# Patient Record
Sex: MALE | Race: WHITE | NOT HISPANIC OR LATINO | Employment: OTHER | URBAN - METROPOLITAN AREA
[De-identification: names, ages, dates, MRNs, and addresses within clinical notes are randomized per-mention and may not be internally consistent; named-entity substitution may affect disease eponyms.]

---

## 2017-02-21 ENCOUNTER — GENERIC CONVERSION - ENCOUNTER (OUTPATIENT)
Dept: OTHER | Facility: OTHER | Age: 82
End: 2017-02-21

## 2017-03-02 ENCOUNTER — TRANSCRIBE ORDERS (OUTPATIENT)
Dept: LAB | Facility: OTHER | Age: 82
End: 2017-03-02

## 2017-03-02 ENCOUNTER — APPOINTMENT (OUTPATIENT)
Dept: LAB | Facility: OTHER | Age: 82
End: 2017-03-02
Payer: MEDICARE

## 2017-03-02 DIAGNOSIS — R73.01 IMPAIRED FASTING GLUCOSE: ICD-10-CM

## 2017-03-02 DIAGNOSIS — I10 ESSENTIAL (PRIMARY) HYPERTENSION: ICD-10-CM

## 2017-03-02 LAB
ALBUMIN SERPL BCP-MCNC: 3.5 G/DL (ref 3.5–5)
ALP SERPL-CCNC: 65 U/L (ref 46–116)
ALT SERPL W P-5'-P-CCNC: 22 U/L (ref 12–78)
ANION GAP SERPL CALCULATED.3IONS-SCNC: 8 MMOL/L (ref 4–13)
AST SERPL W P-5'-P-CCNC: 13 U/L (ref 5–45)
BASOPHILS # BLD AUTO: 0.05 THOUSANDS/ΜL (ref 0–0.1)
BASOPHILS NFR BLD AUTO: 1 % (ref 0–1)
BILIRUB SERPL-MCNC: 0.47 MG/DL (ref 0.2–1)
BUN SERPL-MCNC: 15 MG/DL (ref 5–25)
CALCIUM SERPL-MCNC: 8.9 MG/DL (ref 8.3–10.1)
CHLORIDE SERPL-SCNC: 104 MMOL/L (ref 100–108)
CHOLEST SERPL-MCNC: 157 MG/DL (ref 50–200)
CO2 SERPL-SCNC: 27 MMOL/L (ref 21–32)
CREAT SERPL-MCNC: 0.83 MG/DL (ref 0.6–1.3)
EOSINOPHIL # BLD AUTO: 0.17 THOUSAND/ΜL (ref 0–0.61)
EOSINOPHIL NFR BLD AUTO: 2 % (ref 0–6)
ERYTHROCYTE [DISTWIDTH] IN BLOOD BY AUTOMATED COUNT: 13.2 % (ref 11.6–15.1)
EST. AVERAGE GLUCOSE BLD GHB EST-MCNC: 160 MG/DL
GFR SERPL CREATININE-BSD FRML MDRD: >60 ML/MIN/1.73SQ M
GLUCOSE SERPL-MCNC: 133 MG/DL (ref 65–140)
HBA1C MFR BLD: 7.2 % (ref 4.2–6.3)
HCT VFR BLD AUTO: 36.4 % (ref 36.5–49.3)
HDLC SERPL-MCNC: 66 MG/DL (ref 40–60)
HGB BLD-MCNC: 12.3 G/DL (ref 12–17)
LDLC SERPL CALC-MCNC: 65 MG/DL (ref 0–100)
LYMPHOCYTES # BLD AUTO: 1.99 THOUSANDS/ΜL (ref 0.6–4.47)
LYMPHOCYTES NFR BLD AUTO: 28 % (ref 14–44)
MCH RBC QN AUTO: 30.3 PG (ref 26.8–34.3)
MCHC RBC AUTO-ENTMCNC: 33.8 G/DL (ref 31.4–37.4)
MCV RBC AUTO: 90 FL (ref 82–98)
MONOCYTES # BLD AUTO: 0.62 THOUSAND/ΜL (ref 0.17–1.22)
MONOCYTES NFR BLD AUTO: 9 % (ref 4–12)
NEUTROPHILS # BLD AUTO: 4.17 THOUSANDS/ΜL (ref 1.85–7.62)
NEUTS SEG NFR BLD AUTO: 60 % (ref 43–75)
NRBC BLD AUTO-RTO: 0 /100 WBCS
PLATELET # BLD AUTO: 166 THOUSANDS/UL (ref 149–390)
PMV BLD AUTO: 10.9 FL (ref 8.9–12.7)
POTASSIUM SERPL-SCNC: 4.2 MMOL/L (ref 3.5–5.3)
PROT SERPL-MCNC: 7.2 G/DL (ref 6.4–8.2)
RBC # BLD AUTO: 4.06 MILLION/UL (ref 3.88–5.62)
SODIUM SERPL-SCNC: 139 MMOL/L (ref 136–145)
TRIGL SERPL-MCNC: 129 MG/DL
WBC # BLD AUTO: 7.01 THOUSAND/UL (ref 4.31–10.16)

## 2017-03-02 PROCEDURE — 85025 COMPLETE CBC W/AUTO DIFF WBC: CPT

## 2017-03-02 PROCEDURE — 80053 COMPREHEN METABOLIC PANEL: CPT

## 2017-03-02 PROCEDURE — 80061 LIPID PANEL: CPT

## 2017-03-02 PROCEDURE — 83036 HEMOGLOBIN GLYCOSYLATED A1C: CPT

## 2017-03-02 PROCEDURE — 36415 COLL VENOUS BLD VENIPUNCTURE: CPT

## 2017-03-09 ENCOUNTER — ALLSCRIPTS OFFICE VISIT (OUTPATIENT)
Dept: OTHER | Facility: OTHER | Age: 82
End: 2017-03-09

## 2017-03-09 DIAGNOSIS — R73.01 IMPAIRED FASTING GLUCOSE: ICD-10-CM

## 2017-03-09 DIAGNOSIS — Z79.899 OTHER LONG TERM (CURRENT) DRUG THERAPY: ICD-10-CM

## 2017-03-09 DIAGNOSIS — I10 ESSENTIAL (PRIMARY) HYPERTENSION: ICD-10-CM

## 2017-03-24 ENCOUNTER — GENERIC CONVERSION - ENCOUNTER (OUTPATIENT)
Dept: OTHER | Facility: OTHER | Age: 82
End: 2017-03-24

## 2017-04-20 ENCOUNTER — GENERIC CONVERSION - ENCOUNTER (OUTPATIENT)
Dept: OTHER | Facility: OTHER | Age: 82
End: 2017-04-20

## 2017-04-20 ENCOUNTER — HOSPITAL ENCOUNTER (OUTPATIENT)
Dept: BONE DENSITY | Facility: CLINIC | Age: 82
Discharge: HOME | End: 2017-04-20
Payer: MEDICARE

## 2017-04-20 DIAGNOSIS — Z79.899 OTHER LONG TERM (CURRENT) DRUG THERAPY: ICD-10-CM

## 2017-04-20 PROCEDURE — 77080 DXA BONE DENSITY AXIAL: CPT

## 2017-06-06 ENCOUNTER — LAB (OUTPATIENT)
Dept: LAB | Facility: OTHER | Age: 82
End: 2017-06-06
Payer: MEDICARE

## 2017-06-06 DIAGNOSIS — R73.01 IMPAIRED FASTING GLUCOSE: ICD-10-CM

## 2017-06-06 DIAGNOSIS — I10 ESSENTIAL (PRIMARY) HYPERTENSION: ICD-10-CM

## 2017-06-06 DIAGNOSIS — C61 MALIGNANT NEOPLASM OF PROSTATE (HCC): Primary | ICD-10-CM

## 2017-06-06 LAB
ALBUMIN SERPL BCP-MCNC: 3.6 G/DL (ref 3.5–5)
ALP SERPL-CCNC: 61 U/L (ref 46–116)
ALT SERPL W P-5'-P-CCNC: 24 U/L (ref 12–78)
ANION GAP SERPL CALCULATED.3IONS-SCNC: 7 MMOL/L (ref 4–13)
AST SERPL W P-5'-P-CCNC: 19 U/L (ref 5–45)
BASOPHILS # BLD AUTO: 0.03 THOUSANDS/ΜL (ref 0–0.1)
BASOPHILS NFR BLD AUTO: 1 % (ref 0–1)
BILIRUB SERPL-MCNC: 0.46 MG/DL (ref 0.2–1)
BUN SERPL-MCNC: 14 MG/DL (ref 5–25)
CALCIUM SERPL-MCNC: 8.9 MG/DL (ref 8.3–10.1)
CHLORIDE SERPL-SCNC: 104 MMOL/L (ref 100–108)
CHOLEST SERPL-MCNC: 150 MG/DL (ref 50–200)
CO2 SERPL-SCNC: 28 MMOL/L (ref 21–32)
CREAT SERPL-MCNC: 0.82 MG/DL (ref 0.6–1.3)
EOSINOPHIL # BLD AUTO: 0.2 THOUSAND/ΜL (ref 0–0.61)
EOSINOPHIL NFR BLD AUTO: 3 % (ref 0–6)
ERYTHROCYTE [DISTWIDTH] IN BLOOD BY AUTOMATED COUNT: 13.6 % (ref 11.6–15.1)
EST. AVERAGE GLUCOSE BLD GHB EST-MCNC: 128 MG/DL
GFR SERPL CREATININE-BSD FRML MDRD: >60 ML/MIN/1.73SQ M
GLUCOSE P FAST SERPL-MCNC: 114 MG/DL (ref 65–99)
HBA1C MFR BLD: 6.1 % (ref 4.2–6.3)
HCT VFR BLD AUTO: 36.4 % (ref 36.5–49.3)
HDLC SERPL-MCNC: 62 MG/DL (ref 40–60)
HGB BLD-MCNC: 12.3 G/DL (ref 12–17)
LDLC SERPL CALC-MCNC: 67 MG/DL (ref 0–100)
LYMPHOCYTES # BLD AUTO: 1.94 THOUSANDS/ΜL (ref 0.6–4.47)
LYMPHOCYTES NFR BLD AUTO: 30 % (ref 14–44)
MCH RBC QN AUTO: 30.8 PG (ref 26.8–34.3)
MCHC RBC AUTO-ENTMCNC: 33.8 G/DL (ref 31.4–37.4)
MCV RBC AUTO: 91 FL (ref 82–98)
MONOCYTES # BLD AUTO: 0.54 THOUSAND/ΜL (ref 0.17–1.22)
MONOCYTES NFR BLD AUTO: 8 % (ref 4–12)
NEUTROPHILS # BLD AUTO: 3.76 THOUSANDS/ΜL (ref 1.85–7.62)
NEUTS SEG NFR BLD AUTO: 58 % (ref 43–75)
NRBC BLD AUTO-RTO: 0 /100 WBCS
PLATELET # BLD AUTO: 161 THOUSANDS/UL (ref 149–390)
PMV BLD AUTO: 11 FL (ref 8.9–12.7)
POTASSIUM SERPL-SCNC: 4 MMOL/L (ref 3.5–5.3)
PROT SERPL-MCNC: 7.2 G/DL (ref 6.4–8.2)
PSA SERPL-MCNC: <0.1 NG/ML (ref 0–4)
RBC # BLD AUTO: 4 MILLION/UL (ref 3.88–5.62)
SODIUM SERPL-SCNC: 139 MMOL/L (ref 136–145)
TRIGL SERPL-MCNC: 105 MG/DL
WBC # BLD AUTO: 6.47 THOUSAND/UL (ref 4.31–10.16)

## 2017-06-06 PROCEDURE — 80053 COMPREHEN METABOLIC PANEL: CPT

## 2017-06-06 PROCEDURE — 83036 HEMOGLOBIN GLYCOSYLATED A1C: CPT

## 2017-06-06 PROCEDURE — 84153 ASSAY OF PSA TOTAL: CPT

## 2017-06-06 PROCEDURE — 85025 COMPLETE CBC W/AUTO DIFF WBC: CPT

## 2017-06-06 PROCEDURE — 36415 COLL VENOUS BLD VENIPUNCTURE: CPT

## 2017-06-06 PROCEDURE — 80061 LIPID PANEL: CPT

## 2017-06-13 ENCOUNTER — ALLSCRIPTS OFFICE VISIT (OUTPATIENT)
Dept: OTHER | Facility: OTHER | Age: 82
End: 2017-06-13

## 2017-06-19 ENCOUNTER — GENERIC CONVERSION - ENCOUNTER (OUTPATIENT)
Dept: OTHER | Facility: OTHER | Age: 82
End: 2017-06-19

## 2017-08-13 DIAGNOSIS — R73.01 IMPAIRED FASTING GLUCOSE: ICD-10-CM

## 2017-08-13 DIAGNOSIS — I10 ESSENTIAL (PRIMARY) HYPERTENSION: ICD-10-CM

## 2017-09-05 ENCOUNTER — APPOINTMENT (OUTPATIENT)
Dept: LAB | Facility: OTHER | Age: 82
End: 2017-09-05
Payer: MEDICARE

## 2017-09-05 ENCOUNTER — TRANSCRIBE ORDERS (OUTPATIENT)
Dept: LAB | Facility: OTHER | Age: 82
End: 2017-09-05

## 2017-09-05 DIAGNOSIS — R73.01 IMPAIRED FASTING GLUCOSE: ICD-10-CM

## 2017-09-05 DIAGNOSIS — I10 ESSENTIAL (PRIMARY) HYPERTENSION: ICD-10-CM

## 2017-09-05 LAB
ALBUMIN SERPL BCP-MCNC: 3.2 G/DL (ref 3.5–5)
ALP SERPL-CCNC: 63 U/L (ref 46–116)
ALT SERPL W P-5'-P-CCNC: 18 U/L (ref 12–78)
ANION GAP SERPL CALCULATED.3IONS-SCNC: 6 MMOL/L (ref 4–13)
AST SERPL W P-5'-P-CCNC: 19 U/L (ref 5–45)
BASOPHILS # BLD AUTO: 0.04 THOUSANDS/ΜL (ref 0–0.1)
BASOPHILS NFR BLD AUTO: 1 % (ref 0–1)
BILIRUB SERPL-MCNC: 0.45 MG/DL (ref 0.2–1)
BUN SERPL-MCNC: 15 MG/DL (ref 5–25)
CALCIUM SERPL-MCNC: 8.6 MG/DL (ref 8.3–10.1)
CHLORIDE SERPL-SCNC: 106 MMOL/L (ref 100–108)
CHOLEST SERPL-MCNC: 132 MG/DL (ref 50–200)
CO2 SERPL-SCNC: 26 MMOL/L (ref 21–32)
CREAT SERPL-MCNC: 0.86 MG/DL (ref 0.6–1.3)
EOSINOPHIL # BLD AUTO: 0.27 THOUSAND/ΜL (ref 0–0.61)
EOSINOPHIL NFR BLD AUTO: 4 % (ref 0–6)
ERYTHROCYTE [DISTWIDTH] IN BLOOD BY AUTOMATED COUNT: 13.2 % (ref 11.6–15.1)
EST. AVERAGE GLUCOSE BLD GHB EST-MCNC: 126 MG/DL
GFR SERPL CREATININE-BSD FRML MDRD: 81 ML/MIN/1.73SQ M
GLUCOSE P FAST SERPL-MCNC: 108 MG/DL (ref 65–99)
HBA1C MFR BLD: 6 % (ref 4.2–6.3)
HCT VFR BLD AUTO: 35.7 % (ref 36.5–49.3)
HDLC SERPL-MCNC: 56 MG/DL (ref 40–60)
HGB BLD-MCNC: 12.1 G/DL (ref 12–17)
LDLC SERPL CALC-MCNC: 54 MG/DL (ref 0–100)
LYMPHOCYTES # BLD AUTO: 1.84 THOUSANDS/ΜL (ref 0.6–4.47)
LYMPHOCYTES NFR BLD AUTO: 28 % (ref 14–44)
MCH RBC QN AUTO: 30.7 PG (ref 26.8–34.3)
MCHC RBC AUTO-ENTMCNC: 33.9 G/DL (ref 31.4–37.4)
MCV RBC AUTO: 91 FL (ref 82–98)
MONOCYTES # BLD AUTO: 0.67 THOUSAND/ΜL (ref 0.17–1.22)
MONOCYTES NFR BLD AUTO: 10 % (ref 4–12)
NEUTROPHILS # BLD AUTO: 3.67 THOUSANDS/ΜL (ref 1.85–7.62)
NEUTS SEG NFR BLD AUTO: 57 % (ref 43–75)
NRBC BLD AUTO-RTO: 0 /100 WBCS
PLATELET # BLD AUTO: 164 THOUSANDS/UL (ref 149–390)
PMV BLD AUTO: 10.7 FL (ref 8.9–12.7)
POTASSIUM SERPL-SCNC: 4.1 MMOL/L (ref 3.5–5.3)
PROT SERPL-MCNC: 7 G/DL (ref 6.4–8.2)
RBC # BLD AUTO: 3.94 MILLION/UL (ref 3.88–5.62)
SODIUM SERPL-SCNC: 138 MMOL/L (ref 136–145)
TRIGL SERPL-MCNC: 108 MG/DL
WBC # BLD AUTO: 6.5 THOUSAND/UL (ref 4.31–10.16)

## 2017-09-05 PROCEDURE — 80053 COMPREHEN METABOLIC PANEL: CPT

## 2017-09-05 PROCEDURE — 83036 HEMOGLOBIN GLYCOSYLATED A1C: CPT

## 2017-09-05 PROCEDURE — 80061 LIPID PANEL: CPT

## 2017-09-05 PROCEDURE — 36415 COLL VENOUS BLD VENIPUNCTURE: CPT

## 2017-09-05 PROCEDURE — 85025 COMPLETE CBC W/AUTO DIFF WBC: CPT

## 2017-09-11 ENCOUNTER — ALLSCRIPTS OFFICE VISIT (OUTPATIENT)
Dept: OTHER | Facility: OTHER | Age: 82
End: 2017-09-11

## 2017-09-20 ENCOUNTER — GENERIC CONVERSION - ENCOUNTER (OUTPATIENT)
Dept: OTHER | Facility: OTHER | Age: 82
End: 2017-09-20

## 2017-11-11 DIAGNOSIS — I10 ESSENTIAL (PRIMARY) HYPERTENSION: ICD-10-CM

## 2017-11-11 DIAGNOSIS — R73.01 IMPAIRED FASTING GLUCOSE: ICD-10-CM

## 2017-12-16 ENCOUNTER — TRANSCRIBE ORDERS (OUTPATIENT)
Dept: LAB | Facility: OTHER | Age: 82
End: 2017-12-16

## 2017-12-16 ENCOUNTER — APPOINTMENT (OUTPATIENT)
Dept: LAB | Facility: OTHER | Age: 82
End: 2017-12-16
Payer: MEDICARE

## 2017-12-16 DIAGNOSIS — C61 MALIGNANT NEOPLASM OF PROSTATE (HCC): Primary | ICD-10-CM

## 2017-12-16 DIAGNOSIS — R73.01 IMPAIRED FASTING GLUCOSE: ICD-10-CM

## 2017-12-16 DIAGNOSIS — I10 ESSENTIAL (PRIMARY) HYPERTENSION: ICD-10-CM

## 2017-12-16 LAB
ALBUMIN SERPL BCP-MCNC: 3.6 G/DL (ref 3.5–5)
ALP SERPL-CCNC: 59 U/L (ref 46–116)
ALT SERPL W P-5'-P-CCNC: 21 U/L (ref 12–78)
ANION GAP SERPL CALCULATED.3IONS-SCNC: 5 MMOL/L (ref 4–13)
AST SERPL W P-5'-P-CCNC: 19 U/L (ref 5–45)
BASOPHILS # BLD AUTO: 0.05 THOUSANDS/ΜL (ref 0–0.1)
BASOPHILS NFR BLD AUTO: 1 % (ref 0–1)
BILIRUB SERPL-MCNC: 0.53 MG/DL (ref 0.2–1)
BUN SERPL-MCNC: 14 MG/DL (ref 5–25)
CALCIUM SERPL-MCNC: 8.8 MG/DL (ref 8.3–10.1)
CHLORIDE SERPL-SCNC: 106 MMOL/L (ref 100–108)
CHOLEST SERPL-MCNC: 121 MG/DL (ref 50–200)
CO2 SERPL-SCNC: 28 MMOL/L (ref 21–32)
CREAT SERPL-MCNC: 0.87 MG/DL (ref 0.6–1.3)
EOSINOPHIL # BLD AUTO: 0.18 THOUSAND/ΜL (ref 0–0.61)
EOSINOPHIL NFR BLD AUTO: 3 % (ref 0–6)
ERYTHROCYTE [DISTWIDTH] IN BLOOD BY AUTOMATED COUNT: 13.3 % (ref 11.6–15.1)
EST. AVERAGE GLUCOSE BLD GHB EST-MCNC: 131 MG/DL
GFR SERPL CREATININE-BSD FRML MDRD: 80 ML/MIN/1.73SQ M
GLUCOSE P FAST SERPL-MCNC: 97 MG/DL (ref 65–99)
HBA1C MFR BLD: 6.2 % (ref 4.2–6.3)
HCT VFR BLD AUTO: 35 % (ref 36.5–49.3)
HDLC SERPL-MCNC: 58 MG/DL (ref 40–60)
HGB BLD-MCNC: 11.8 G/DL (ref 12–17)
LDLC SERPL CALC-MCNC: 46 MG/DL (ref 0–100)
LYMPHOCYTES # BLD AUTO: 1.75 THOUSANDS/ΜL (ref 0.6–4.47)
LYMPHOCYTES NFR BLD AUTO: 30 % (ref 14–44)
MCH RBC QN AUTO: 30.6 PG (ref 26.8–34.3)
MCHC RBC AUTO-ENTMCNC: 33.7 G/DL (ref 31.4–37.4)
MCV RBC AUTO: 91 FL (ref 82–98)
MONOCYTES # BLD AUTO: 0.43 THOUSAND/ΜL (ref 0.17–1.22)
MONOCYTES NFR BLD AUTO: 7 % (ref 4–12)
NEUTROPHILS # BLD AUTO: 3.45 THOUSANDS/ΜL (ref 1.85–7.62)
NEUTS SEG NFR BLD AUTO: 59 % (ref 43–75)
NRBC BLD AUTO-RTO: 0 /100 WBCS
PLATELET # BLD AUTO: 158 THOUSANDS/UL (ref 149–390)
PMV BLD AUTO: 10.9 FL (ref 8.9–12.7)
POTASSIUM SERPL-SCNC: 4.2 MMOL/L (ref 3.5–5.3)
PROT SERPL-MCNC: 7.2 G/DL (ref 6.4–8.2)
PSA SERPL-MCNC: <0.1 NG/ML (ref 0–4)
RBC # BLD AUTO: 3.86 MILLION/UL (ref 3.88–5.62)
SODIUM SERPL-SCNC: 139 MMOL/L (ref 136–145)
TRIGL SERPL-MCNC: 84 MG/DL
WBC # BLD AUTO: 5.88 THOUSAND/UL (ref 4.31–10.16)

## 2017-12-16 PROCEDURE — 83036 HEMOGLOBIN GLYCOSYLATED A1C: CPT

## 2017-12-16 PROCEDURE — 84153 ASSAY OF PSA TOTAL: CPT

## 2017-12-16 PROCEDURE — 85025 COMPLETE CBC W/AUTO DIFF WBC: CPT

## 2017-12-16 PROCEDURE — 80061 LIPID PANEL: CPT

## 2017-12-16 PROCEDURE — 80053 COMPREHEN METABOLIC PANEL: CPT

## 2017-12-17 ENCOUNTER — GENERIC CONVERSION - ENCOUNTER (OUTPATIENT)
Dept: OTHER | Facility: OTHER | Age: 82
End: 2017-12-17

## 2017-12-22 ENCOUNTER — GENERIC CONVERSION - ENCOUNTER (OUTPATIENT)
Dept: OTHER | Facility: OTHER | Age: 82
End: 2017-12-22

## 2017-12-22 ENCOUNTER — ALLSCRIPTS OFFICE VISIT (OUTPATIENT)
Dept: OTHER | Facility: OTHER | Age: 82
End: 2017-12-22

## 2017-12-22 DIAGNOSIS — I10 ESSENTIAL (PRIMARY) HYPERTENSION: ICD-10-CM

## 2017-12-23 NOTE — PROGRESS NOTES
Assessment   1  Hypertension, benign (401 1) (I10)  2  Hyperlipidemia (272 4) (E78 5)  3  Congestive heart failure (428 0) (I50 9)  4  Impaired fasting glucose (790 21) (R73 01)  5  Polyosteoarthritis, 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)    Discussion/Summary   Discussion Summary:    Chronic problems are stable  Continue present medications  Continue diet and exercise  Ordered labs for next visit  Counseling Documentation With Imm: The patient was counseled regarding instructions for management,-- impressions  Medication SE Review and Pt Understands Tx: Possible side effects of new medications were reviewed with the patient/guardian today  The treatment plan was reviewed with the patient/guardian  The patient/guardian understands and agrees with the treatment plan    Self Referrals:    Self Referrals: No      Chief Complaint   Chief Complaint Free Text Note Form: Patient is here today for a routine follow up and lab review  History of Present Illness   HPI: Patient comes in today for routine follow-up  He states he is doing well and has no new complaints  His blood pressure is controlled  His blood work shows his cholesterol and sugar are well controlled  Heart failure is stable  Arthritis is about the same  Denies any new complaints today  Review of Systems   Complete-Male:      Cardiovascular: no chest pain  Respiratory: no shortness of breath  Gastrointestinal: no abdominal pain  Active Problems   1  Chronic pain (338 29) (G89 29)  2  Congestive heart failure (428 0) (I50 9)  3  History of aortic valve replacement with bioprosthetic valve (V42 2) (Z95 3)  4  Hyperlipidemia (272 4) (E78 5)  5  Hypertension, benign (401 1) (I10)  6  Impaired fasting glucose (790 21) (R73 01)  7  Iron deficiency anemia (280 9) (D50 9)  8  Long term current use of therapeutic drug (V58 69) (Z79 899)  9  Need for prophylactic vaccination and inoculation against influenza (V04 81) (Z23)  10   Osteoarthritis of hip (715 95) (M16 9)  11  Other urinary incontinence (788 39) (N39 498)  12  Polyosteoarthritis, 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)  13  Screening for genitourinary condition (V81 6) (Z13 89)    Past Medical History   1  Acute bronchitis (466 0) (J20 9)  2  Denied: History of mental disorder  3  History of Need for influenza vaccination (V04 81) (Z23)  Active Problems And Past Medical History Reviewed: The active problems and past medical history were reviewed and updated today  Surgical History   1  History of Aortic Valve Replacement  2  History of Cardioverter-defibrillator Pulse Generator Insertion  3  History of Prostatectomy Radical    Family History   Mother   1  Family history of   2  Family history of emphysema (V17 6) (Z82 5)  Father   3  Family history of   Sibling   4  Family history of substance abuse (V17 0) (Z81 4)  Family History   5  Denied: Family history of mental disorder    Social History    · Denied: History of High risk sexual behavior   · Denied: History of Illicit drug use   · Never smoker   · Retired   · Social alcohol use (Z78 9)    Current Meds   1  Carvedilol 12 5 MG Oral Tablet; TAKE 1 5 TABLET Twice daily; Therapy: (Recorded:30Wqa2978) to Recorded  2  Centrum Silver Oral Tablet; Therapy: 49TMX7214 to Recorded  3  Furosemide 20 MG Oral Tablet; TAKE 1 TABLET TWICE DAILY; Therapy: (Recorded:2015) to Recorded  4  Klor-Con 20 MEQ Oral Packet; take 1 packet daily; Therapy: (Recorded:65Oxu7759) to Recorded  5  Lisinopril 5 MG Oral Tablet; Take 1 tablet daily; Therapy: (Recorded:2017) to Recorded  6  Rosuvastatin Calcium 20 MG Oral Tablet; Take 1 tablet daily; Therapy: 21JCY2156 to (Last Rx:25Hwh3133)  Requested for: 47Apl3845 Ordered  7  TraMADol HCl - 50 MG Oral Tablet; TAKE 1 TABLET Twice daily PRN PAIN  Requested for:     00JXA4515; Last Rx:47Ahs5776 Ordered  8  Vitamin B12 TABS; TAKE 2 TABLET Daily 1000 mcg;      Therapy: (Recorded:89Oma8393) to Recorded  Medication List Reviewed: The medication list was reviewed and updated today  Allergies   1  No Known Drug Allergies    Vitals   Vital Signs    Recorded: 49Tdc8847 01:25PM   Temperature 97 7 F   Heart Rate 73   Systolic 262   Diastolic 50   Height 5 ft 7 in   Weight 217 lb 4 oz   BMI Calculated 34 03   BSA Calculated 2 09   O2 Saturation 97     Physical Exam        Constitutional      General appearance: No acute distress, well appearing and well nourished  Pulmonary      Respiratory effort: No increased work of breathing or signs of respiratory distress  Auscultation of lungs: Clear to auscultation, equal breath sounds bilaterally, no wheezes, no rales, no rhonci  Cardiovascular      Auscultation of heart: Normal rate and rhythm, normal S1 and S2, without murmurs  Examination of extremities for edema and/or varicosities: Normal        Abdomen      Abdomen: Non-tender, no masses  Liver and spleen: No hepatomegaly or splenomegaly  Psychiatric      Orientation to person, place and time: Normal        Mood and affect: Normal           Results/Data   (1) CBC/PLT/DIFF 51PTZ0550 10:09AM Alisha Finn Order Number: [id number]      Test Name Result Flag Reference   WBC COUNT 5 88 Thousand/uL  4 31-10 16   RBC COUNT 3 86 Million/uL L 3 88-5 62   HEMOGLOBIN 11 8 g/dL L 12 0-17 0   HEMATOCRIT 35 0 % L 36 5-49 3   MCV 91 fL  82-98   MCH 30 6 pg  26 8-34 3   MCHC 33 7 g/dL  31 4-37 4   RDW 13 3 %  11 6-15 1   MPV 10 9 fL  8 9-12 7   PLATELET COUNT 204 Thousands/uL  149-390   nRBC AUTOMATED 0 /100 WBCs     NEUTROPHILS RELATIVE PERCENT 59 %  43-75   LYMPHOCYTES RELATIVE PERCENT 30 %  14-44   MONOCYTES RELATIVE PERCENT 7 %  4-12   EOSINOPHILS RELATIVE PERCENT 3 %  0-6   BASOPHILS RELATIVE PERCENT 1 %  0-1   NEUTROPHILS ABSOLUTE COUNT 3 45 Thousands/? ??L  1 85-7 62   LYMPHOCYTES ABSOLUTE COUNT 1 75 Thousands/? ??L  0 60-4 47   MONOCYTES ABSOLUTE COUNT 0 43 Thousand/? ??L  0 17-1 22   EOSINOPHILS ABSOLUTE COUNT 0 18 Thousand/? ??L  0 00-0 61   BASOPHILS ABSOLUTE COUNT 0 05 Thousands/? ??L  0 00-0 10      (1) COMPREHENSIVE METABOLIC PANEL 81DGM8813 51:54GT [place] Order Number: [id number]      Test Name Result Flag Reference   SODIUM 139 mmol/L  136-145   POTASSIUM 4 2 mmol/L  3 5-5 3   CHLORIDE 106 mmol/L  100-108   CARBON DIOXIDE 28 mmol/L  21-32   ANION GAP (CALC) 5 mmol/L  4-13   BLOOD UREA NITROGEN 14 mg/dL  5-25   CREATININE 0 87 mg/dL  0 60-1 30   Standardized to IDMS reference method   CALCIUM 8 8 mg/dL  8 3-10 1   BILI, TOTAL 0 53 mg/dL  0 20-1 00   ALK PHOSPHATAS 59 U/L     ALT (SGPT) 21 U/L  12-78   Specimen collection should occur prior to Sulfasalazine and/or Sulfapyridine administration due to the potential for falsely depressed results  AST(SGOT) 19 U/L  5-45   Specimen collection should occur prior to Sulfasalazine administration due to the potential for falsely depressed results  ALBUMIN 3 6 g/dL  3 5-5 0   TOTAL PROTEIN 7 2 g/dL  6 4-8 2   eGFR 80 ml/min/1 73sq [facility] Disease Education Program recommendations are as follows:     GFR calculation is accurate only with a steady state creatinine     Chronic Kidney disease less than 60 ml/min/1 73 sq  meters     Kidney failure less than 15 ml/min/1 73 sq  meters  GLUCOSE FASTING 97 mg/dL  65-99   Specimen collection should occur prior to Sulfasalazine administration due to the potential for falsely depressed results  Specimen collection should occur prior to Sulfapyridine administration due to the potential for falsely elevated results        (1) LIPID PANEL, FASTING 63Wlj1949 10:09AM [place] Order Number: [id number]      Test Name Result Flag Reference   CHOLESTEROL 121 mg/dL     HDL,DIRECT 58 mg/dL  40-60   Specimen collection should occur prior to Metamizole administration due to the potential for falsley depressed results  LDL CHOLESTEROL CALCULATED 46 mg/dL  0-100   Triglyceride:           Normal <150 mg/dl      Borderline High 150-199 mg/dl      High 200-499 mg/dl      Very High >499 mg/dl               Cholesterol:          Desirable <200 mg/dl       Borderline High 200-239 mg/dl       High >239 mg/dl               HDL Cholesterol:          High>59 mg/dL       Low <41 mg/dL               This screening LDL is a calculated result  It does not have the accuracy of the Direct Measured LDL in the monitoring of patients with hyperlipidemia and/or statin therapy  Direct Measure LDL (JSQ461) must be ordered separately in these patients  TRIGLYCERIDES 84 mg/dL  <=150   Specimen collection should occur prior to N-Acetylcysteine or Metamizole administration due to the potential for falsely depressed results  (1) HEMOGLOBIN A1C 24XSF2047 10:09AM [facility] Order Number: [id number]      Test Name Result Flag Reference   HEMOGLOBIN A1C 6 2 %  4 2-6 3   EST  AVG  GLUCOSE 131 mg/dl        (1) PSA, DIAGNOSTIC (FOLLOW-UP) 28HSR1624 10:09AM [facility], Provider   Test ordered by: Omero Bourgeois      Test Name Result Flag Reference   PSA <0 1 ng/mL  0 0-4 0   American Urological Association Guidelines define biochemical recurrence of prostate cancer as a detectable or rising PSA value post-radical prostatectomy that is greater than or equal to 0 2 ng/mL with a second confirmatory level of greater than or equal to 0 2 ng/mL  Health Management   History of Encounter for special screening examination for genitourinary disorder   *VB - Urinary Incontinence Screen (Dx Z13 89 Screen for UI); every 1 year; Last 12NGZ7896; Next    Due: 86AFI9803; Active  History of Encounter for special screening examination for nervous system disorder   *VB - Fall Risk Assessment  (Dx Z13 89 Screen for Neurologic Disorder); every 1 year; Last    72SSR4718; Next Due: 01FST5368;  Active  History of Wellness examination   *VB-Depression Screening; every 1 year; Last 80GDJ2835; Next Due: 90Jbo2893; Active  Medicare Annual Wellness Visit; every 1 year; Last 50ZWG9222; Next Due: 74GZM5552;  Overdue    Signatures    Electronically signed by : Emma Giraldo MD; Dec 22 2017  1:46PM EST                       (Author)     Electronically signed by : Emam Giraldo MD; Dec 22 2017  1:56PM EST                       (Author)

## 2018-01-09 NOTE — PROGRESS NOTES
Assessment    1  Encounter for preventive health examination (V70 0) (Z00 00)    Plan   Health Maintenance    · The plan of care for urinary incontinence is detailed in the plan and/or discussion section  of today's note ; Status:Complete;   Done: 99VQI3249   · There are many exercise options for seniors ; Status:Complete;   Done: 58OQQ2603    Prevnar 13 Intramuscular Suspension; INJECT 0 5  ML Intramuscular; Dose: 0 5; Route: Intramuscular; Site: Right Deltoid; Done: 00MUB0359 09:14AM; Status: Complete;  Ordered  For: Need for pneumococcal vaccine; Ordered By:Walter Pierson; Effective Date:06Sep2016; Administered by: Chiquita Walton: 9/6/2016 9:14:00 AM; Last Updated By: Chiquita Walton; 9/6/2016 9:15:09 AM     Discussion/Summary    Patient requests no treatment for his mild incontinence  Impression: Subsequent Annual Wellness Visit, with preventive exam as well as age and risk appropriate counseling completed  Cardiovascular screening and counseling: screening is current  Diabetes screening and counseling: screening is current  Colorectal cancer screening and counseling: screening not indicated  Prostate cancer screening and counseling: screening is current  Glaucoma screening and counseling: screening is current  Immunizations: influenza vaccine is due today and the risks and benefits of pneumococcal vaccination were discussed with the patient  Patient Discussion: plan discussed with the patient, follow-up visit needed in one year  Self Referrals: No      History of Present Illness  The patient is being seen for the initial annual wellness visit  Medicare Screening and Risk Factors   Hospitalizations: no previous hospitalizations  Medicare Screening Tests Risk Questions   Drug and Alcohol Use: The patient has never smoked cigarettes  The patient reports occasional alcohol use  Alcohol concern:   The patient has no concerns about alcohol abuse  He has never used illicit drugs     Diet and Physical Activity: Current diet includes low salt food choices, 1 servings of fruit per day, 2 servings of meat per day, 2 servings of whole grains per day, 2 cups of coffee per day and 3-4 glasses of water  The patient does not exercise  Mood Disorder and Cognitive Impairment Screening: He denies feeling down, depressed, or hopeless over the past two weeks  He denies feeling little interest or pleasure in doing things over the past two weeks  Cognitive impairment screening: denies difficulty learning/retaining new information, denies difficulty handling complex tasks, denies difficulty with reasoning, denies difficulty with spatial ability and orientation, denies difficulty with language and denies difficulty with behavior  Functional Ability/Level of Safety: He reports hearing difficulties  Activities of daily living details: does not need help using the phone, no transportation help needed, does not need help shopping, no meal preparation help needed, does not need help doing housework, does not need help doing laundry, does not need help managing medications and does not need help managing money  Fall risk factors: The patient fell 0 times in the past 12 months  Home safety risk factors:  loose rugs, uneven floors, no grab bars in the bathroom and no handrails on the stairs, but no unfamiliar surroundings, no poor household lighting and no household clutter  Advance Directives: Advance directives: living will, durable power of  for health care directives and advance directives  end of life decisions were reviewed with the patient  Co-Managers and Medical Equipment/Suppliers: See Patient Care Team     Last Medicare Wellness Visit Information was reviewed, patient interviewed and updates made to the record today  Falls Risk: The patient fell 0 times in the past 12 months  The patient is currently asymptomatic Symptoms Include: The patient is currently experiencing urinary symptoms   Urinary Incontinence Symptoms includes:        Review of Systems    Cardiovascular: no chest pain  Respiratory: no shortness of breath  Active Problems     1  Chronic pain (338 29) (G89 29)   2  Congestive heart failure (428 0) (I50 9)   3  History of aortic valve replacement with bioprosthetic valve (V42 2) (Z95 4)   4  Iron deficiency anemia (280 9) (D50 9)   5  Need for influenza vaccination (V04 81) (Z23)   6  Need for pneumococcal vaccine (V03 82) (Z23)   7  Need for Zostavax administration (V04 89) (Z23)   8  Other urinary incontinence (788 39) (N39 498)   9  Polyosteoarthritis, 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)   10  Screening for genitourinary condition (V81 6) (Z13 89)   11  Status post left hip replacement (V43 64) (Z23 253)   12  Wellness examination (V70 0) (Z00 00)    Hyperlipidemia (272 4) (E78 5)       Hypertension, benign (401 1) (I10)       Osteoarthritis of hip (715 95) (M16 9)          Past Medical History    1  Denied: History of mental disorder   2  History of Need for influenza vaccination (V04 81) (Z23)    The active problems and past medical history were reviewed and updated today  Surgical History    1  History of Aortic Valve Replacement   2  History of Cardioverter-defibrillator Pulse Generator Insertion   3  History of Prostatectomy Radical    The surgical history was reviewed and updated today  Family History  Mother    1  Family history of    2  Family history of emphysema (V17 6) (Z82 5)  Father    3  Family history of   Sibling    4  Family history of substance abuse (V17 0) (Z81 4)  Family History    5  Denied: Family history of mental disorder    The family history was reviewed and updated today  Social History    · Denied: History of High risk sexual behavior   · Denied: History of Illicit drug use   · Never smoker   · Retired   · Social alcohol use (Z78 9)  The social history was reviewed and updated today  Current Meds   1   Carvedilol 12 5 MG Oral Tablet; TAKE 1 5 TABLET Twice daily; Therapy: (Recorded:31Gec8506) to Recorded   2  Centrum Silver Oral Tablet; Therapy: 21OME4170 to Recorded   3  Colace 100 MG Oral Capsule; Take 1 capsule twice daily as needed Recorded   4  Ferrous Sulfate 325 (65 Fe) MG Oral Tablet; TAKE 1 TABLET TWICE DAILY WITH MEALS; Therapy: 10JXU3496 to (Evaluate:90Rdn2236) Recorded   5  Furosemide 20 MG Oral Tablet; TAKE 1 TABLET TWICE DAILY; Therapy: (Recorded:24Mar2015) to Recorded   6  Klor-Con 20 MEQ Oral Packet; take 1 packet daily; Therapy: (0345 74 47 21) to Recorded   7  Lisinopril 2 5 MG Oral Tablet; Take 1 tablet daily as directed; Therapy: 84XQM7261 to (Last Rx:48Rdp5910)  Requested for: 58Efp4559 Ordered   8  Rosuvastatin Calcium 20 MG Oral Tablet; Take 1 tablet daily; Therapy: 60QVV2458 to (Last Rx:05Rwu5757)  Requested for: 86PSI1658 Ordered   9  TraMADol HCl - 50 MG Oral Tablet; Take 1 tablet twice daily  Requested for: 07Lxw0296;   Last Rx:13Kep8458 Ordered   10  Tylenol 325 MG Oral Tablet; TAKE 1 TO 2 TABLETS EVERY 4 HOURS AS NEEDED    Recorded    The medication list was reviewed and updated today  Allergies    1  No Known Drug Allergies    Immunizations  Influenza --- [place] Primrose: [id number]-Wlh-5003Iduibqh Pore: 25-Nov-2015   PPSV --- [place] Primrose: Temporarily Deferred: Pt refuses, As of: 54UTR6012, Defer for 1 Years   Tdap --- Melburn Primrose: 04-May-2011   Zoster --- [place] Primrose: Temporarily Deferred: Pt refuses, As of: 69SBK4098, Defer for 1 Years     Health Management  History of Encounter for special screening examination for genitourinary disorder   *VB - Urinary Incontinence Screen (Dx V81 6 Screen for UI); every 1 year; Last 62LWR5577; Next  Due: 57BTL9720; Active  History of Encounter for special screening examination for nervous system disorder   *VB - Fall Risk Assessment  (Dx V80 09 Screen for Neurologic Disorder); every 1 year; Last  59AZU1604; Next Due: 02FGL1604;  Active  Wellness examination   *VB-Depression Screening; every 1 year; Last 76XRY6909; Next Due: 48BXJ8595; Active  Medicare Annual Wellness Visit; every 1 year; Last 27FMB8768; Next Due: 22NID1090;  Active    Future Appointments    Date/Time Provider Specialty Site   12/06/2016 11:20 AM Mode Bianchi MD Internal Medicine [facility] INTERNAL MED     Signatures   Electronically signed by : Tatiana Soni MD; Sep  6 2016  9:19AM EST                       (Author)

## 2018-01-11 NOTE — PROGRESS NOTES
Assessment    1  Polyosteoarthritis, 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)   2  Chronic pain (338 29) (G89 29)    Discussion/Summary  Discussion Summary:   Options are limited secondary to his age and new med restrictions  He's going to call   He's been on tramadol for years without side effects  Can't have NSAIDS  Can't have narcotics  Tylenol ineffective  He's been to rheumatology  He's seen orthopedics  Tramadol even let him play golf  Counseling Documentation With Imm: The patient was counseled regarding instructions for management, impressions  total time of encounter was 25 minutes and 25 minutes was spent counseling  Medication SE Review and Pt Understands Tx: Possible side effects of new medications were reviewed with the patient/guardian today  Chief Complaint  Chief Complaint Free Text Note Form: Discuss pain medicine      History of Present Illness  HPI: Here for followup because his insurance declined his tramadol  He had switched to the extended relaase that rheumatology had put him on and that may have triggered the flag  He's been without the medicine for 2 days  So far not too bad but it's only been two days  Active Problems    1  Chronic pain (338 29) (G89 29)   2  Congestive heart failure (428 0) (I50 9)   3  History of aortic valve replacement with bioprosthetic valve (V42 2) (Z95 4)   4  Hyperlipidemia (272 4) (E78 5)   5  Hypertension, benign (401 1) (I10)   6  Need for influenza vaccination (V04 81) (Z23)   7  Other urinary incontinence (788 39) (N39 498)   8  Polyosteoarthritis, 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)   9  Wellness examination (V70 0) (Z01 89)    Past Medical History    1  History of Need for influenza vaccination (V04 81) (Z23)  Active Problems And Past Medical History Reviewed: The active problems and past medical history were reviewed and updated today  Surgical History    1  History of Aortic Valve Replacement   2   History of Cardioverter-defibrillator Pulse Generator Insertion   3  History of Prostatectomy Radical    Family History    1  Family history of    2  Family history of emphysema (V17 6) (Z82 5)    3  Family history of     Social History    · Never a smoker    Current Meds   1  Carvedilol 12 5 MG Oral Tablet; TAKE 1 5 TABLET Twice daily; Therapy: (Recorded:65Amq0897) to Recorded   2  Crestor 20 MG Oral Tablet; Take 1 tablet daily; Therapy: 10SSQ7751 to (Last Rx:2015)  Requested for: 2015 Ordered   3  Furosemide 20 MG Oral Tablet; TAKE 1 TABLET TWICE DAILY; Therapy: (Recorded:23Rgu5012) to Recorded   4  Klor-Con 20 MEQ Oral Packet; take 1 packet daily; Therapy: (Recorded:85Jwt3255) to Recorded   5  Lisinopril 2 5 MG Oral Tablet; Take 1 tablet daily as directed; Therapy: 04IYE8722 to (Last Rx:86Brj7763)  Requested for: 12QNK0620 Ordered   6  TraMADol HCl  MG Oral Capsule Extended Release 24 Hour; Take 1 capsule twice daily; Last   ME:36HXD5845 Ordered  Medication List Reviewed: The medication list was reviewed and updated today  Allergies    1  No Known Drug Allergies    Vitals  Vital Signs [Data Includes: Current Encounter]    Recorded: 38BGM9273 01:21PM   Heart Rate 80   Systolic 314   Diastolic 70   Height 5 ft 7 in   Weight 225 lb    BMI Calculated 35 24   BSA Calculated 2 13   O2 Saturation 98     Physical Exam    Constitutional   General appearance: No acute distress, well appearing and well nourished  Health Management  History of Encounter for special screening examination for genitourinary disorder   *VB-Urinary Incontinence Screen (Dx V81 6 Screen for UI); every 1 year; Last 06KUF8740; Next Due:  71QPD7111; Active  History of Encounter for special screening examination for nervous system disorder   *VB - Fall Risk Assessment  (Dx V80 09 Screen for Neurologic Disorder); every 1 year; Last  76UBP4429; Next Due: 53SIR5275; Active  Wellness examination   *VB-Depression Screening; every 1 year;  Last 61NWK1409; Next Due: 22HWV1615; Active  Medicare Annual Wellness Visit; every 1 year; Last 70TWC4382; Next Due: 03HWN2927;  Active    Future Appointments    Date/Time Provider Specialty Site   03/25/2016 11:00 AM Lore Winters MD Internal Medicine [facility] INTERNAL MED     Signatures   Electronically signed by : Karen Bowens MD; Chris 15 2016  1:46PM EST                       (Author)

## 2018-01-11 NOTE — PROGRESS NOTES
Assessment    1  Impaired fasting glucose (790 21) (R73 01)   2  Hypertension, benign (401 1) (I10)   3  Hyperlipidemia (272 4) (E78 5)   4  Congestive heart failure (428 0) (I50 9)    Plan  Hypertension, benign    · (1) CBC/PLT/DIFF; Status:Active; Requested for:09Mar2017;    · (1) COMPREHENSIVE METABOLIC PANEL; Status:Active; Requested for:09Mar2017;    · (1) LIPID PANEL, FASTING; Status:Active; Requested EOA:23CKY5142;    · Begin a limited exercise program ; Status:Complete;   Done: 36DYK7573   · Follow-up visit in 3 months Evaluation and Treatment  Follow-up  Status: Complete  Done:  25JUR6979  Impaired fasting glucose    · (1) HEMOGLOBIN A1C; Status:Active; Requested for:09Mar2017;   Long term current use of therapeutic drug    · * DXA BONE DENSITY SPINE HIP AND PELVIS; Status:Hold For - Scheduling; Requested  YAF:[id number]; Other urinary incontinence, Screening for genitourinary condition    · *VB - Urinary Incontinence Screen (Dx Z13 89 Screen for UI); Status:Complete - Retrospective By  Protocol Authorization;   Done: 44VMK4659 10:31AM  PMH: Encounter for special screening examination for genitourinary disorder    · *VB - Urinary Incontinence Screen (Dx Z13 89 Screen for UI) ; every 1 year; Last 28DBE7875; Next  15ZGH9991; Status:Active    Discussion/Summary  Discussion Summary:   Chronic problems are stable  Reviewed his diet with him  His fatigue may be from the sugar  He's going to knock out the soda and reduce the pasta  Repeat labs in 3 months  Continue followup with specialists  Medication SE Review and Pt Understands Tx: Possible side effects of new medications were reviewed with the patient/guardian today  The treatment plan was reviewed with the patient/guardian   The patient/guardian understands and agrees with the treatment plan   Self Referrals:   Self Referrals: No      Chief Complaint  Chief Complaint Free Text Note Form: Patient returns today for a routine follow up and to review lab results  History of Present Illness  HPI: Patient comes in today for routine follow-up  His blood work is within acceptable limits except his sugar has gotten higher  He is with his wife today  She points out that he drinks a lot of soda  Also a lot of pasta  Since his last visit, he did see his cardiologist and had cardiac and pulmonary testing which were all normal  He still has the fatigue  Review of Systems  Complete-Male:   Cardiovascular: no chest pain  Respiratory: no shortness of breath  Gastrointestinal: no abdominal pain  Active Problems    1  Chronic pain (338 29) (G89 29)   2  Congestive heart failure (428 0) (I50 9)   3  History of aortic valve replacement with bioprosthetic valve (V42 2) (Z95 4)   4  Hyperlipidemia (272 4) (E78 5)   5  Hypertension, benign (401 1) (I10)   6  Impaired fasting glucose (790 21) (R73 01)   7  Iron deficiency anemia (280 9) (D50 9)   8  Osteoarthritis of hip (715 95) (M16 9)   9  Other urinary incontinence (788 39) (N39 498)   10  Polyosteoarthritis, 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)   11  Screening for genitourinary condition (V81 6) (Z13 89)    Past Medical History    1  Denied: History of mental disorder   2  History of Need for influenza vaccination (V04 81) (Z23)  Active Problems And Past Medical History Reviewed: The active problems and past medical history were reviewed and updated today  Surgical History    1  History of Aortic Valve Replacement   2  History of Cardioverter-defibrillator Pulse Generator Insertion   3  History of Prostatectomy Radical    Family History  Mother    1  Family history of    2  Family history of emphysema (V17 6) (Z82 5)  Father    3  Family history of   Sibling    4  Family history of substance abuse (V17 0) (Z81 4)  Family History    5   Denied: Family history of mental disorder    Social History    · Denied: History of High risk sexual behavior   · Denied: History of Illicit drug use   · Never smoker   · Retired   · Social alcohol use (Z78 9)    Current Meds   1  Carvedilol 12 5 MG Oral Tablet; TAKE 1 5 TABLET Twice daily; Therapy: (Recorded:03Knf4376) to Recorded   2  Centrum Silver Oral Tablet; Therapy: 11LIX1173 to Recorded   3  Furosemide 20 MG Oral Tablet; TAKE 1 TABLET TWICE DAILY; Therapy: (Recorded:24Mar2015) to Recorded   4  Klor-Con 20 MEQ Oral Packet; take 1 packet daily; Therapy: (Recorded:24Jul2015) to Recorded   5  Lisinopril 5 MG Oral Tablet; Take 1 tablet daily; Therapy: (Recorded:09Mar2017) to Recorded   6  Rosuvastatin Calcium 20 MG Oral Tablet; Take 1 tablet daily; Therapy: 83PNI5341 to (Last Rx:07Mar2017)  Requested for: 35CSF1386 Ordered   7  TraMADol HCl - 50 MG Oral Tablet; Take 1 tablet twice daily  Requested for: 06Wwc6843; Last   Rx:60Dmr7968 Ordered  Medication List Reviewed: The medication list was reviewed and updated today  Allergies    1  No Known Drug Allergies    Vitals  Vital Signs    Recorded: 50JYA2998 10:19AM   Heart Rate 82   Systolic 942   Diastolic 74   Height 5 ft 7 in   Weight 228 lb 2 08 oz   BMI Calculated 35 73   BSA Calculated 2 14   O2 Saturation 97     Physical Exam    Constitutional   General appearance: No acute distress, well appearing and well nourished  Pulmonary   Respiratory effort: No increased work of breathing or signs of respiratory distress  Auscultation of lungs: Clear to auscultation, equal breath sounds bilaterally, no wheezes, no rales, no rhonci  Cardiovascular   Auscultation of heart: Normal rate and rhythm, normal S1 and S2, without murmurs  Examination of extremities for edema and/or varicosities: Normal     Abdomen   Abdomen: Non-tender, no masses  Liver and spleen: No hepatomegaly or splenomegaly      Psychiatric   Orientation to person, place and time: Normal     Mood and affect: Normal          Results/Data  *VB - Urinary Incontinence Screen (Dx Z13 89 Screen for UI) 57SEQ9564 10:31AM [employer] [facility] Test Name Result Flag Reference   Urinary Incontinence Assessment 40ZNY3265 A      Falls Risk Assessment (Dx Z13 89 Screen for Neurologic Disorder) 17XSX5146 10:29AM User, Shashas     Test Name Result Flag Reference   Falls Risk      No falls in the past year     (1) CBC/PLT/DIFF 69GEC8771 09:34AM [facility] Order Number: [id number]     Test Name Result Flag Reference   WBC COUNT 7 01 Thousand/uL  4 31-10 16   RBC COUNT 4 06 Million/uL  3 88-5 62   HEMOGLOBIN 12 3 g/dL  12 0-17 0   HEMATOCRIT 36 4 % L 36 5-49 3   MCV 90 fL  82-98   MCH 30 3 pg  26 8-34 3   MCHC 33 8 g/dL  31 4-37 4   RDW 13 2 %  11 6-15 1   MPV 10 9 fL  8 9-12 7   PLATELET COUNT 203 Thousands/uL  149-390   nRBC AUTOMATED 0 /100 WBCs     NEUTROPHILS RELATIVE PERCENT 60 %  43-75   LYMPHOCYTES RELATIVE PERCENT 28 %  14-44   MONOCYTES RELATIVE PERCENT 9 %  4-12   EOSINOPHILS RELATIVE PERCENT 2 %  0-6   BASOPHILS RELATIVE PERCENT 1 %  0-1   NEUTROPHILS ABSOLUTE COUNT 4 17 Thousands/? ??L  1 85-7 62   LYMPHOCYTES ABSOLUTE COUNT 1 99 Thousands/? ??L  0 60-4 47   MONOCYTES ABSOLUTE COUNT 0 62 Thousand/? ??L  0 17-1 22   EOSINOPHILS ABSOLUTE COUNT 0 17 Thousand/? ??L  0 00-0 61   BASOPHILS ABSOLUTE COUNT 0 05 Thousands/? ??L  0 00-0 10   - Patient Instructions: This bloodwork is non-fasting  Please drink two glasses of water morning of bloodwork  - Patient Instructions: This bloodwork is non-fasting  Please drink two glasses of water morning of bloodwork  (1) COMPREHENSIVE METABOLIC PANEL 78LBA5037 79:26HE [facility] Order Number: [id number]     Test Name Result Flag Reference   GLUCOSE,RANDM 133 mg/dL     If the patient is fasting, the ADA then defines impaired fasting glucose as > 100 mg/dL and diabetes as > or equal to 123 mg/dL     SODIUM 139 mmol/L  136-145   POTASSIUM 4 2 mmol/L  3 5-5 3   CHLORIDE 104 mmol/L  100-108   CARBON DIOXIDE 27 mmol/L  21-32   ANION GAP (CALC) 8 mmol/L  4-13   BLOOD UREA NITROGEN 15 mg/dL  5-25   CREATININE 0 83 mg/dL  0 60-1 30   Standardized to IDMS reference method   CALCIUM 8 9 mg/dL  8 3-10 1   BILI, TOTAL 0 47 mg/dL  0 20-1 00   ALK PHOSPHATAS 65 U/L     ALT (SGPT) 22 U/L  12-78   AST(SGOT) 13 U/L  5-45   ALBUMIN 3 5 g/dL  3 5-5 0   TOTAL PROTEIN 7 2 g/dL  6 4-8 2   eGFR Non-African American      >60 0 ml/min/1 73sq m   - Patient Instructions: This is a fasting blood test  Water,black tea or black  coffee only after 9:00pm the night before test Drink 2 glasses of water the morning of test   National Kidney Disease Education Program recommendations are as follows:  GFR calculation is accurate only with a steady state creatinine  Chronic Kidney disease less than 60 ml/min/1 73 sq  meters  Kidney failure less than 15 ml/min/1 73 sq  meters  (1) LIPID PANEL, FASTING 02Mar2017 09:34AM [facility] Order Number: [id number]     Test Name Result Flag Reference   CHOLESTEROL 157 mg/dL     HDL,DIRECT 66 mg/dL H 40-60   Specimen collection should occur prior to Metamizole administration due to the potential for falsely depressed results  LDL CHOLESTEROL CALCULATED 65 mg/dL  0-100   - Patient Instructions: This is a fasting blood test  Water,black tea or black  coffee only after 9:00pm the night before test   Drink 2 glasses of water the morning of test     - Patient Instructions: This is a fasting blood test  Water,black tea or black  coffee only after 9:00pm the night before test Drink 2 glasses of water the morning of test   Triglyceride:         Normal              <150 mg/dl       Borderline High    150-199 mg/dl       High               200-499 mg/dl       Very High          >499 mg/dl  Cholesterol:         Desirable        <200 mg/dl      Borderline High  200-239 mg/dl      High             >239 mg/dl  HDL Cholesterol:        High    >59 mg/dL      Low     <41 mg/dL  LDL CALCULATED:    This screening LDL is a calculated result    It does not have the accuracy of the Direct Measured LDL in the monitoring of patients with hyperlipidemia and/or statin therapy  Direct Measure LDL (NJM822) must be ordered separately in these patients  TRIGLYCERIDES 129 mg/dL  <=150   Specimen collection should occur prior to N-Acetylcysteine or Metamizole administration due to the potential for falsely depressed results  (1) HEMOGLOBIN A1C 35MKL0742 09:34AM [facility] Order Number: [id number]     Test Name Result Flag Reference   HEMOGLOBIN A1C 7 2 % H 4 2-6 3   EST  AVG  GLUCOSE 160 mg/dl       Health Management  History of Encounter for special screening examination for genitourinary disorder   *VB - Urinary Incontinence Screen (Dx Z13 89 Screen for UI); every 1 year; Last 28RUG0519; Next  Due: 54LYC9347; Active  History of Encounter for special screening examination for nervous system disorder   *VB - Fall Risk Assessment  (Dx Z13 89 Screen for Neurologic Disorder); every 1 year; Last  76CQO7123; Next Due: 10FCE1952; Near Due  History of Wellness examination   *VB-Depression Screening; every 1 year; Last 84FFI9533; Next Due: 80HUM0036; Overdue  Medicare Annual Wellness Visit; every 1 year; Last 47MFI1812; Next Due: 69LNV2381;  Overdue    Future Appointments    Date/Time Provider Specialty Site   06/13/2017 11:00 AM Alem Weber MD Internal Medicine [facility] INTERNAL MED     Signatures   Electronically signed by : Nhan Terry MD; Mar  9 2017 11:36AM EST                       (Author)

## 2018-01-12 VITALS
HEIGHT: 67 IN | BODY MASS INDEX: 34.28 KG/M2 | WEIGHT: 218.38 LBS | SYSTOLIC BLOOD PRESSURE: 126 MMHG | OXYGEN SATURATION: 97 % | DIASTOLIC BLOOD PRESSURE: 68 MMHG | HEART RATE: 92 BPM

## 2018-01-12 NOTE — RESULT NOTES
Message   Blood count still around 10  Would repeat CBC in one week and stay on the iron       Verified Results  (1) CBC/PLT/DIFF 92NXD2690 09:48AM Adi العلي Order Number: [id number]     Order Number: [id number]     Test Name Result Flag Reference   WBC COUNT 8 05 Thousand/uL  4 31-10 16   RBC COUNT 3 33 Million/uL L 3 88-5 62   HEMOGLOBIN 10 0 g/dL L 12 0-17 0   HEMATOCRIT 30 7 % L 36 5-49 3   MCV 92 fL  82-98   MCH 30 0 pg  26 8-34 3   MCHC 32 6 g/dL  31 4-37 4   RDW 14 0 %  11 6-15 1   MPV 9 3 fL  8 9-12 7   PLATELET COUNT 491 Thousands/uL  149-390   nRBC AUTOMATED 0 /100 WBCs     NEUTROPHILS RELATIVE PERCENT 69 %  43-75   LYMPHOCYTES RELATIVE PERCENT 20 %  14-44   MONOCYTES RELATIVE PERCENT 8 %  4-12   EOSINOPHILS RELATIVE PERCENT 3 %  0-6   BASOPHILS RELATIVE PERCENT 0 %  0-1   NEUTROPHILS ABSOLUTE COUNT 5 55 Thousands/µL  1 85-7 62   LYMPHOCYTES ABSOLUTE COUNT 1 61 Thousands/µL  0 60-4 47   MONOCYTES ABSOLUTE COUNT 0 63 Thousand/µL  0 17-1 22   EOSINOPHILS ABSOLUTE COUNT 0 21 Thousand/µL  0 00-0 61   BASOPHILS ABSOLUTE COUNT 0 03 Thousands/µL  0 00-0 10

## 2018-01-13 VITALS
OXYGEN SATURATION: 97 % | WEIGHT: 228.13 LBS | HEIGHT: 67 IN | SYSTOLIC BLOOD PRESSURE: 132 MMHG | HEART RATE: 82 BPM | DIASTOLIC BLOOD PRESSURE: 74 MMHG | BODY MASS INDEX: 35.81 KG/M2

## 2018-01-14 VITALS
HEART RATE: 83 BPM | DIASTOLIC BLOOD PRESSURE: 68 MMHG | OXYGEN SATURATION: 98 % | WEIGHT: 219.38 LBS | BODY MASS INDEX: 34.43 KG/M2 | HEIGHT: 67 IN | SYSTOLIC BLOOD PRESSURE: 124 MMHG

## 2018-01-14 NOTE — RESULT NOTES
Message   blood count steadily going up, now in normal range     Verified Results  (1) CBC/PLT/DIFF 84OTX0240 07:45AM Mario Santiago   TW Order Number: [id number]  TW Order Number: [id number]     Test Name Result Flag Reference   WBC COUNT 6 23 Thousand/uL  4 31-10 16   RBC COUNT 4 00 Million/uL  3 88-5 62   HEMOGLOBIN 12 0 g/dL  12 0-17 0   HEMATOCRIT 36 9 %  36 5-49 3   MCV 92 fL  82-98   MCH 30 0 pg  26 8-34 3   MCHC 32 5 g/dL  31 4-37 4   RDW 13 9 %  11 6-15 1   MPV 11 1 fL  8 9-12 7   PLATELET COUNT 625 Thousands/uL  149-390   nRBC AUTOMATED 0 /100 WBCs     NEUTROPHILS RELATIVE PERCENT 65 %  43-75   LYMPHOCYTES RELATIVE PERCENT 23 %  14-44   MONOCYTES RELATIVE PERCENT 8 %  4-12   EOSINOPHILS RELATIVE PERCENT 3 %  0-6   BASOPHILS RELATIVE PERCENT 1 %  0-1   NEUTROPHILS ABSOLUTE COUNT 4 06 Thousands/?L  1 85-7 62   LYMPHOCYTES ABSOLUTE COUNT 1 46 Thousands/?L  0 60-4 47   MONOCYTES ABSOLUTE COUNT 0 49 Thousand/?L  0 17-1 22   EOSINOPHILS ABSOLUTE COUNT 0 17 Thousand/?L  0 00-0 61   BASOPHILS ABSOLUTE COUNT 0 04 Thousands/?L  0 00-0 10

## 2018-01-15 NOTE — RESULT NOTES
Message   Blood count is steadily improving    Have him recheck a CBC priot to his appt 5/2     Verified Results  (1) CBC/PLT/DIFF 08Apr2016 10:01AM Eri Shauna Order Number: [id number]     Order Number: [id number]     Test Name Result Flag Reference   WBC COUNT 6 67 Thousand/uL  4 31-10 16   RBC COUNT 3 57 Million/uL L 3 88-5 62   HEMOGLOBIN 10 8 g/dL L 12 0-17 0   HEMATOCRIT 32 8 % L 36 5-49 3   MCV 92 fL  82-98   MCH 30 3 pg  26 8-34 3   MCHC 32 9 g/dL  31 4-37 4   RDW 14 1 %  11 6-15 1   MPV 9 8 fL  8 9-12 7   PLATELET COUNT 481 Thousands/uL  149-390   nRBC AUTOMATED 0 /100 WBCs     NEUTROPHILS RELATIVE PERCENT 64 %  43-75   LYMPHOCYTES RELATIVE PERCENT 22 %  14-44   MONOCYTES RELATIVE PERCENT 11 %  4-12   EOSINOPHILS RELATIVE PERCENT 3 %  0-6   BASOPHILS RELATIVE PERCENT 0 %  0-1   NEUTROPHILS ABSOLUTE COUNT 4 24 Thousands/µL  1 85-7 62   LYMPHOCYTES ABSOLUTE COUNT 1 45 Thousands/µL  0 60-4 47   MONOCYTES ABSOLUTE COUNT 0 73 Thousand/µL  0 17-1 22   EOSINOPHILS ABSOLUTE COUNT 0 21 Thousand/µL  0 00-0 61   BASOPHILS ABSOLUTE COUNT 0 03 Thousands/µL  0 00-0 10

## 2018-01-15 NOTE — RESULT NOTES
Verified Results  (1) CBC/PLT/DIFF [id number] 08:16AM [facility] Order Number: [id number]     Test Name Result Flag Reference   WBC COUNT 6 98 Thousand/uL  4 31-10 16   RBC COUNT 4 00 Million/uL  3 88-5 62   HEMOGLOBIN 12 2 g/dL  12 0-17 0   HEMATOCRIT 35 8 % L 36 5-49 3   MCV 90 fL  82-98   MCH 30 5 pg  26 8-34 3   MCHC 34 1 g/dL  31 4-37 4   RDW 13 3 %  11 6-15 1   MPV 10 7 fL  8 9-12 7   PLATELET COUNT 813 Thousands/uL  149-390   nRBC AUTOMATED 0 /100 WBCs     NEUTROPHILS RELATIVE PERCENT 58 %  43-75   LYMPHOCYTES RELATIVE PERCENT 27 %  14-44   MONOCYTES RELATIVE PERCENT 11 %  4-12   EOSINOPHILS RELATIVE PERCENT 3 %  0-6   BASOPHILS RELATIVE PERCENT 1 %  0-1   NEUTROPHILS ABSOLUTE COUNT 4 05 Thousands/?L  1 85-7 62   LYMPHOCYTES ABSOLUTE COUNT 1 90 Thousands/?L  0 60-4 47   MONOCYTES ABSOLUTE COUNT 0 76 Thousand/?L  0 17-1 22   EOSINOPHILS ABSOLUTE COUNT 0 21 Thousand/?L  0 00-0 61   BASOPHILS ABSOLUTE COUNT 0 05 Thousands/?L  0 00-0 10   - Patient Instructions: This bloodwork is non-fasting  Please drink two glasses of water morning of bloodwork  - Patient Instructions: This bloodwork is non-fasting  Please drink two glasses of water morning of bloodwork  (1) COMPREHENSIVE METABOLIC PANEL 06VVF5493 13:23DV [facility] Order Number: [id number]     Test Name Result Flag Reference   GLUCOSE,RANDM 122 mg/dL     If the patient is fasting, the ADA then defines impaired fasting glucose as > 100 mg/dL and diabetes as > or equal to 123 mg/dL     SODIUM 140 mmol/L  136-145   POTASSIUM 4 1 mmol/L  3 5-5 3   CHLORIDE 104 mmol/L  100-108   CARBON DIOXIDE 28 mmol/L  21-32   ANION GAP (CALC) 8 mmol/L  4-13   BLOOD UREA NITROGEN 17 mg/dL  5-25   CREATININE 0 81 mg/dL  0 60-1 30   Standardized to IDMS reference method   CALCIUM 9 1 mg/dL  8 3-10 1   BILI, TOTAL 0 53 mg/dL  0 20-1 00   ALK PHOSPHATAS 67 U/L     ALT (SGPT) 23 U/L  12-78   AST(SGOT) 15 U/L  5-45   ALBUMIN 3 6 g/dL 3 5-5 0   TOTAL PROTEIN 7 2 g/dL  6 4-8 2   eGFR Non-African American      >60 0 ml/min/1 73sq m   - Patient Instructions: This is a fasting blood test  Water,black tea or black  coffee only after 9:00pm the night before test Drink 2 glasses of water the morning of test   National Kidney Disease Education Program recommendations are as follows:  GFR calculation is accurate only with a steady state creatinine  Chronic Kidney disease less than 60 ml/min/1 73 sq  meters  Kidney failure less than 15 ml/min/1 73 sq  meters  (1) LIPID PANEL, FASTING 29Nov2016 08:16AM Ava Martel Order Number: [id number]     Test Name Result Flag Reference   CHOLESTEROL 162 mg/dL     HDL,DIRECT 58 mg/dL  40-60   Specimen collection should occur prior to Metamizole administration due to the potential for falsely depressed results  LDL CHOLESTEROL CALCULATED 81 mg/dL  0-100   - Patient Instructions: This is a fasting blood test  Water,black tea or black  coffee only after 9:00pm the night before test   Drink 2 glasses of water the morning of test     - Patient Instructions: This is a fasting blood test  Water,black tea or black  coffee only after 9:00pm the night before test Drink 2 glasses of water the morning of test   Triglyceride:         Normal              <150 mg/dl       Borderline High    150-199 mg/dl       High               200-499 mg/dl       Very High          >499 mg/dl  Cholesterol:         Desirable        <200 mg/dl      Borderline High  200-239 mg/dl      High             >239 mg/dl  HDL Cholesterol:        High    >59 mg/dL      Low     <41 mg/dL  LDL CALCULATED:    This screening LDL is a calculated result  It does not have the accuracy of the Direct Measured LDL in the monitoring of patients with hyperlipidemia and/or statin therapy  Direct Measure LDL (JJJ662) must be ordered separately in these patients     TRIGLYCERIDES 113 mg/dL  <=150   Specimen collection should occur prior to N-Acetylcysteine or Metamizole administration due to the potential for falsely depressed results  (1) HEMOGLOBIN A1C [id number] 08:16AM Kiran Stoddard Order Number: [id number]     Test Name Result Flag Reference   HEMOGLOBIN A1C 6 3 %  4 2-6 3   EST  AVG   GLUCOSE 134 mg/dl

## 2018-01-16 NOTE — RESULT NOTES
Verified Results  (1) CBC/PLT/DIFF 26Apr2016 08:04AM Barby Johnston Order Number: [id number]     Order Number: [id number]     Test Name Result Flag Reference   WBC COUNT 7 49 Thousand/uL  4 31-10 16   RBC COUNT 3 90 Million/uL  3 88-5 62   HEMOGLOBIN 11 5 g/dL L 12 0-17 0   HEMATOCRIT 36 2 % L 36 5-49 3   MCV 93 fL  82-98   MCH 29 5 pg  26 8-34 3   MCHC 31 8 g/dL  31 4-37 4   RDW 14 0 %  11 6-15 1   MPV 10 1 fL  8 9-12 7   PLATELET COUNT 991 Thousands/uL  149-390   nRBC AUTOMATED 0 /100 WBCs     NEUTROPHILS RELATIVE PERCENT 63 %  43-75   LYMPHOCYTES RELATIVE PERCENT 19 %  14-44   MONOCYTES RELATIVE PERCENT 9 %  4-12   EOSINOPHILS RELATIVE PERCENT 8 % H 0-6   BASOPHILS RELATIVE PERCENT 1 %  0-1   NEUTROPHILS ABSOLUTE COUNT 4 65 Thousands/µL  1 85-7 62   LYMPHOCYTES ABSOLUTE COUNT 1 45 Thousands/µL  0 60-4 47   MONOCYTES ABSOLUTE COUNT 0 68 Thousand/µL  0 17-1 22   EOSINOPHILS ABSOLUTE COUNT 0 60 Thousand/µL  0 00-0 61   BASOPHILS ABSOLUTE COUNT 0 10 Thousands/µL  0 00-0 10

## 2018-01-17 NOTE — RESULT NOTES
Verified Results  (1) COMPREHENSIVE METABOLIC PANEL [id number] 05:84UF Mariana Finn   TW Order Number: [id number]     Test Name Result Flag Reference   GLUCOSE,RANDM 124 mg/dL     If the patient is fasting, the ADA then defines impaired fasting glucose as > 100 mg/dL and diabetes as > or equal to 123 mg/dL  SODIUM 138 mmol/L  136-145   POTASSIUM 4 2 mmol/L  3 5-5 3   CHLORIDE 104 mmol/L  100-108   CARBON DIOXIDE 27 mmol/L  21-32   ANION GAP (CALC) 7 mmol/L  4-13   BLOOD UREA NITROGEN 12 mg/dL  5-25   CREATININE 0 78 mg/dL  0 60-1 30   Standardized to IDMS reference method   CALCIUM 8 9 mg/dL  8 3-10 1   BILI, TOTAL 0 34 mg/dL  0 20-1 00   ALK PHOSPHATAS 66 U/L     ALT (SGPT) 21 U/L  12-78   AST(SGOT) 12 U/L  5-45   ALBUMIN 3 4 g/dL L 3 5-5 0   TOTAL PROTEIN 7 0 g/dL  6 4-8 2   eGFR Non-African American      >60 0 ml/min/1 73sq [facility] Disease Education Program recommendations are as follows:  GFR calculation is accurate only with a steady state creatinine  Chronic Kidney disease less than 60 ml/min/1 73 sq  meters  Kidney failure less than 15 ml/min/1 73 sq  meters  (1) LIPID PANEL, FASTING 03Sep2016 07:43AM Mariana Finn   TW Order Number: [id number]     Test Name Result Flag Reference   CHOLESTEROL 158 mg/dL     HDL,DIRECT 58 mg/dL  40-60   Specimen collection should occur prior to Metamizole administration due to the potential for falsely depressed results  LDL CHOLESTEROL CALCULATED 80 mg/dL  0-100   Triglyceride:         Normal              <150 mg/dl       Borderline High    150-199 mg/dl       High               200-499 mg/dl       Very High          >499 mg/dl  Cholesterol:         Desirable        <200 mg/dl      Borderline High  200-239 mg/dl      High             >239 mg/dl  HDL Cholesterol:        High    >59 mg/dL      Low     <41 mg/dL  LDL CALCULATED:    This screening LDL is a calculated result    It does not have the accuracy of the Direct Measured LDL in the monitoring of patients with hyperlipidemia and/or statin therapy  Direct Measure LDL (XJM569) must be ordered separately in these patients  TRIGLYCERIDES 100 mg/dL  <=150   Specimen collection should occur prior to N-Acetylcysteine or Metamizole administration due to the potential for falsely depressed results

## 2018-01-17 NOTE — MISCELLANEOUS
Assessment    1  Iron deficiency anemia (280 9) (D50 9)   2  Congestive heart failure (428 0) (I50 9)   3  Status post left hip replacement (V43 64) (W33 372)    Plan  Iron deficiency anemia    · (1) CBC/PLT/DIFF; Status:Active; Requested for:28Mar2016;    Perform:[facility]; FSE:[id number];HPGHCFF; For:Iron deficiency anemia; Ordered By:Anna Pierson;  Status post left hip replacement    · Follow-up visit in 1 month Evaluation and Treatment  Follow-up  Status: Complete  Done:  36QEZ5123   Ordered; For: Status post left hip replacement; Ordered By: Rubina Hinkle Performed:  Due: 02Apr2016; Last Updated By: Jay Heal; 3/28/2016 11:57:15 AM    Discussion/Summary  Discussion Summary:   Doing well  Repeat CBC this week  Meds reviewed  [facility] paperwork reviewed  Counseling Documentation With Imm: The patient was counseled regarding instructions for management, impressions  Medication SE Review and Pt Understands Tx: The treatment plan was reviewed with the patient/guardian  The patient/guardian understands and agrees with the treatment plan      Chief Complaint  Chief Complaint Free Text Note Form: HOSPITAL FOLLOW UP      History of Present Illness  TCM Communication [facility]: The patient is being contacted for follow-up after hospitalization  He was hospitalized at and [address] [place] [address]  The dates of hospitalization: 3/16/2016, date of discharge: 03/23/2016  Diagnosis: LEFT HIP REPLACEMENT  He was discharged to home  He scheduled a follow up appointment     Symptoms: no fever, no weakness, no dizziness, no headache, no fatigue, no cough, no shortness of breath, no chest pain, no back pain on left side, no back pain on right side, no arm pain left side, no arm pain on right side, no leg pain on left side, no leg pain on right side, no upper abdominal pain, no middle abdominal pain, no lower abdominal pain, no rash:, no anorexia, no nausea, no vomiting, no loose stools, no constipation, no pain with urinating, no incisional pain and no wound drainage  Communication performed and completed by       HPI: Here for followup after his hip surgery and rehab stay in [place]  He did well in the hospital without any complications  He states he also did well at rehab  No problems since returning home  [facility] paperwork from [place] did show a low Hb from the   He denies having a blood transfusion  Review of Systems  Complete-Male:   Cardiovascular: no chest pain  Respiratory: no shortness of breath  Active Problems    1  Chronic pain (338 29) (G89 29)   2  Congestive heart failure (428 0) (I50 9)   3  History of aortic valve replacement with bioprosthetic valve (V42 2) (Z95 4)   4  Hyperlipidemia (272 4) (E78 5)   5  Hypertension, benign (401 1) (I10)   6  Need for influenza vaccination (V04 81) (Z23)   7  Osteoarthritis of hip (715 95) (M16 9)   8  Other urinary incontinence (788 39) (N39 498)   9  Polyosteoarthritis, 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)   10  Preop examination (V72 84) (Z01 818)   11  Wellness examination (V70 0) (Z00 00)    Past Medical History    1  Denied: History of mental disorder   2  History of Need for influenza vaccination (V04 81) (Z23)    Surgical History    1  History of Aortic Valve Replacement   2  History of Cardioverter-defibrillator Pulse Generator Insertion   3  History of Prostatectomy Radical    Family History    1  Family history of    2  Family history of emphysema (V17 6) (Z82 5)    3  Family history of     4  Family history of substance abuse (V17 0) (Z81 4)    5  Denied: Family history of mental disorder    Social History    · Denied: History of High risk sexual behavior   · Denied: History of Illicit drug use   · Never a smoker   · Retired   · Social alcohol use (Z78 9)    Current Meds   1  Carvedilol 12 5 MG Oral Tablet; TAKE 1 5 TABLET Twice daily; Therapy: (Recorded:61Lcw5508) to Recorded   2  Colace 100 MG Oral Capsule;  Take 1 capsule twice daily as needed Recorded   3  Crestor 20 MG Oral Tablet; Take 1 tablet daily; Therapy: 12PLF0369 to (Last Rx:26Oct2015)  Requested for: 26Oct2015 Ordered   4  Furosemide 20 MG Oral Tablet; TAKE 1 TABLET TWICE DAILY; Therapy: (Recorded:24Mar2015) to Recorded   5  Klor-Con 20 MEQ Oral Packet; take 1 packet daily; Therapy: ([id number] 21) to Recorded   6  Lisinopril 2 5 MG Oral Tablet; Take 1 tablet daily as directed; Therapy: 59SSL8104 to (Last Rx:76Nkr6093)  Requested for: 71AUJ0045 Ordered   7  Pantoprazole Sodium 40 MG Oral Tablet Delayed Release; TAKE ONE TABLET BY   MOUTH ONE TIME DAILY Recorded   8  Robaxin 500 MG Oral Tablet; TAKE 1 TABLET 3 times daily Recorded   9  TraMADol HCl - 50 MG Oral Tablet; Take 1 tablet twice daily; Therapy: (Recorded:68Yhe2200) to  Requested for: 99Not7068 Recorded   10  Tylenol 325 MG Oral Tablet; TAKE 1 TO 2 TABLETS EVERY 4 HOURS AS NEEDED    Recorded  Medication List Reviewed: The medication list was reviewed and updated today  Allergies    1  No Known Drug Allergies    Vitals  Signs [Data Includes: Current Encounter]   Recorded: 28Mar2016 11:28AM   Heart Rate: 67  Systolic: 690  Diastolic: 58  Height: 5 ft 7 in  Weight: 226 lb   BMI Calculated: 35 4  BSA Calculated: 2 13  O2 Saturation: 94    Physical Exam    Constitutional   General appearance: No acute distress, well appearing and well nourished  Pulmonary   Respiratory effort: No increased work of breathing or signs of respiratory distress  Auscultation of lungs: Clear to auscultation, equal breath sounds bilaterally, no wheezes, no rales, no rhonci  Cardiovascular   Auscultation of heart: Normal rate and rhythm, normal S1 and S2, without murmurs  Examination of extremities for edema and/or varicosities: Normal          Health Management  History of Encounter for special screening examination for genitourinary disorder   *VB-Urinary Incontinence Screen (Dx V81 6 Screen for UI); every 1 year;  Last 87GXH2912; Next Due:  58KHZ5088; Active  History of Encounter for special screening examination for nervous system disorder   *VB - Fall Risk Assessment  (Dx V80 09 Screen for Neurologic Disorder); every 1 year; Last  50GLX3017; Next Due: 28DUI2698; Overdue  Wellness examination   *VB-Depression Screening; every 1 year; Last 86CBL9991; Next Due: 91FEL3924; Active  Medicare Annual Wellness Visit; every 1 year; Last 90UXJ9003; Next Due: 96YHH5088;  Active    Signatures   Electronically signed by : Dorothy Fuentes MD; Mar 28 2016 12:06PM EST                       (Author)

## 2018-01-22 VITALS
OXYGEN SATURATION: 99 % | WEIGHT: 217 LBS | HEART RATE: 90 BPM | HEIGHT: 67 IN | SYSTOLIC BLOOD PRESSURE: 102 MMHG | BODY MASS INDEX: 34.06 KG/M2 | DIASTOLIC BLOOD PRESSURE: 60 MMHG

## 2018-01-23 VITALS
HEIGHT: 67 IN | BODY MASS INDEX: 34.1 KG/M2 | OXYGEN SATURATION: 97 % | WEIGHT: 217.25 LBS | DIASTOLIC BLOOD PRESSURE: 50 MMHG | HEART RATE: 73 BPM | SYSTOLIC BLOOD PRESSURE: 118 MMHG | TEMPERATURE: 97.7 F

## 2018-01-23 NOTE — RESULT NOTES
Verified Results  (1) CBC/PLT/DIFF 24IFU1049 10:09AM [facility] Order Number: [id number]     Test Name Result Flag Reference   WBC COUNT 5 88 Thousand/uL  4 31-10 16   RBC COUNT 3 86 Million/uL L 3 88-5 62   HEMOGLOBIN 11 8 g/dL L 12 0-17 0   HEMATOCRIT 35 0 % L 36 5-49 3   MCV 91 fL  82-98   MCH 30 6 pg  26 8-34 3   MCHC 33 7 g/dL  31 4-37 4   RDW 13 3 %  11 6-15 1   MPV 10 9 fL  8 9-12 7   PLATELET COUNT 855 Thousands/uL  149-390   nRBC AUTOMATED 0 /100 WBCs     NEUTROPHILS RELATIVE PERCENT 59 %  43-75   LYMPHOCYTES RELATIVE PERCENT 30 %  14-44   MONOCYTES RELATIVE PERCENT 7 %  4-12   EOSINOPHILS RELATIVE PERCENT 3 %  0-6   BASOPHILS RELATIVE PERCENT 1 %  0-1   NEUTROPHILS ABSOLUTE COUNT 3 45 Thousands/? ??L  1 85-7 62   LYMPHOCYTES ABSOLUTE COUNT 1 75 Thousands/? ??L  0 60-4 47   MONOCYTES ABSOLUTE COUNT 0 43 Thousand/? ??L  0 17-1 22   EOSINOPHILS ABSOLUTE COUNT 0 18 Thousand/? ??L  0 00-0 61   BASOPHILS ABSOLUTE COUNT 0 05 Thousands/? ??L  0 00-0 10     (1) COMPREHENSIVE METABOLIC PANEL 37UPM9520 29:07SC [facility] Order Number: [id number]     Test Name Result Flag Reference   SODIUM 139 mmol/L  136-145   POTASSIUM 4 2 mmol/L  3 5-5 3   CHLORIDE 106 mmol/L  100-108   CARBON DIOXIDE 28 mmol/L  21-32   ANION GAP (CALC) 5 mmol/L  4-13   BLOOD UREA NITROGEN 14 mg/dL  5-25   CREATININE 0 87 mg/dL  0 60-1 30   Standardized to IDMS reference method   CALCIUM 8 8 mg/dL  8 3-10 1   BILI, TOTAL 0 53 mg/dL  0 20-1 00   ALK PHOSPHATAS 59 U/L     ALT (SGPT) 21 U/L  12-78   Specimen collection should occur prior to Sulfasalazine and/or Sulfapyridine administration due to the potential for falsely depressed results  AST(SGOT) 19 U/L  5-45   Specimen collection should occur prior to Sulfasalazine administration due to the potential for falsely depressed results     ALBUMIN 3 6 g/dL  3 5-5 0   TOTAL PROTEIN 7 2 g/dL  6 4-8 2   eGFR 80 ml/min/1 73sq m     National Kidney Disease Education Program recommendations are as follows:  GFR calculation is accurate only with a steady state creatinine  Chronic Kidney disease less than 60 ml/min/1 73 sq  meters  Kidney failure less than 15 ml/min/1 73 sq  meters  GLUCOSE FASTING 97 mg/dL  65-99   Specimen collection should occur prior to Sulfasalazine administration due to the potential for falsely depressed results  Specimen collection should occur prior to Sulfapyridine administration due to the potential for falsely elevated results  (1) LIPID PANEL, FASTING 15Soz8760 10:09AM [facility] Order Number: [id number]     Test Name Result Flag Reference   CHOLESTEROL 121 mg/dL     HDL,DIRECT 58 mg/dL  40-60   Specimen collection should occur prior to Metamizole administration due to the potential for falsley depressed results  LDL CHOLESTEROL CALCULATED 46 mg/dL  0-100   Triglyceride:        Normal <150 mg/dl   Borderline High 150-199 mg/dl   High 200-499 mg/dl   Very High >499 mg/dl      Cholesterol:       Desirable <200 mg/dl    Borderline High 200-239 mg/dl    High >239 mg/dl      HDL Cholesterol:       High>59 mg/dL    Low <41 mg/dL      This screening LDL is a calculated result  It does not have the accuracy of the Direct Measured LDL in the monitoring of patients with hyperlipidemia and/or statin therapy  Direct Measure LDL (AJG251) must be ordered separately in these patients  TRIGLYCERIDES 84 mg/dL  <=150   Specimen collection should occur prior to N-Acetylcysteine or Metamizole administration due to the potential for falsely depressed results  (1) HEMOGLOBIN A1C 72ZDW1248 10:09 [facility] Order Number: [id number]     Test Name Result Flag Reference   HEMOGLOBIN A1C 6 2 %  4 2-6 3   EST  AVG   GLUCOSE 131 mg/dl

## 2018-05-11 DIAGNOSIS — E78.2 MIXED HYPERLIPIDEMIA: Primary | ICD-10-CM

## 2018-05-11 RX ORDER — ROSUVASTATIN CALCIUM 20 MG/1
TABLET, COATED ORAL
Qty: 90 TABLET | Refills: 0 | Status: SHIPPED | OUTPATIENT
Start: 2018-05-11 | End: 2018-05-18 | Stop reason: SDUPTHER

## 2018-05-18 ENCOUNTER — OFFICE VISIT (OUTPATIENT)
Dept: INTERNAL MEDICINE CLINIC | Facility: CLINIC | Age: 83
End: 2018-05-18
Payer: MEDICARE

## 2018-05-18 VITALS
DIASTOLIC BLOOD PRESSURE: 72 MMHG | OXYGEN SATURATION: 96 % | HEART RATE: 82 BPM | WEIGHT: 219.8 LBS | HEIGHT: 67 IN | SYSTOLIC BLOOD PRESSURE: 124 MMHG | BODY MASS INDEX: 34.5 KG/M2

## 2018-05-18 DIAGNOSIS — I50.22 CHRONIC SYSTOLIC HEART FAILURE (HCC): ICD-10-CM

## 2018-05-18 DIAGNOSIS — E78.2 MIXED HYPERLIPIDEMIA: ICD-10-CM

## 2018-05-18 DIAGNOSIS — Z01.818 PRE-OP EXAMINATION: Primary | ICD-10-CM

## 2018-05-18 DIAGNOSIS — H25.13 AGE-RELATED NUCLEAR CATARACT OF BOTH EYES: ICD-10-CM

## 2018-05-18 PROCEDURE — 99214 OFFICE O/P EST MOD 30 MIN: CPT | Performed by: INTERNAL MEDICINE

## 2018-05-18 RX ORDER — FUROSEMIDE 20 MG/1
40 TABLET ORAL DAILY
COMMUNITY

## 2018-05-18 RX ORDER — POTASSIUM CHLORIDE 1.5 G/1.77G
20 POWDER, FOR SOLUTION ORAL DAILY
COMMUNITY

## 2018-05-18 RX ORDER — CARVEDILOL 12.5 MG/1
18.75 TABLET ORAL
COMMUNITY
Start: 2017-05-30

## 2018-05-18 RX ORDER — ROSUVASTATIN CALCIUM 20 MG/1
20 TABLET, COATED ORAL DAILY
Qty: 90 TABLET | Refills: 3 | Status: SHIPPED | OUTPATIENT
Start: 2018-05-18 | End: 2019-07-20 | Stop reason: SDUPTHER

## 2018-05-18 RX ORDER — LISINOPRIL 5 MG/1
5 TABLET ORAL
COMMUNITY
Start: 2018-02-13

## 2018-05-18 NOTE — PROGRESS NOTES
Assessment/Plan:    Patient is consideredcleared for the planned procedures with no further workup indicated  He reports he has already been given instructions regarding his medications  Followup scheduled per orders  No problem-specific Assessment & Plan notes found for this encounter  Diagnoses and all orders for this visit:    Mixed hyperlipidemia  -     rosuvastatin (CRESTOR) 20 MG tablet; Take 1 tablet (20 mg total) by mouth daily    Other orders  -     aspirin 81 MG tablet; Take 81 mg by mouth  -     bimatoprost (LUMIGAN) 0 01 % ophthalmic drops; Apply 1 drop to eye  -     carvedilol (COREG) 12 5 mg tablet; Take 18 75 mg by mouth  -     Omega-3 Fatty Acids (FISH OIL PO); Take 2,400 mg by mouth  -     furosemide (LASIX) 20 mg tablet; Take 1 tablet by mouth 2 (two) times a day  -     potassium chloride (KLOR-CON) 20 mEq packet; Take 1 packet by mouth daily  -     lisinopril (ZESTRIL) 5 mg tablet; Take 5 mg by mouth  -     leuprolide (ELIGARD) 45 MG injection; Inject 45 mg under the skin          Subjective:      Patient ID: Sarai Guerra is a 80 y o  male  Patient comes in today for preop evaluation for cataract surgery secondary to worsening vision  He reports they have been watching these cataracts for a while but now his vision is worsening and he needs them taking care of  His chronic medical problems are stable  He does have congestive heart failure but that has been under very good control  He follows routinely with Cardiology  His blood pressure has been controlled  He has had borderline sugars but has made adjustments in his diet so they are less of a problem  He reports a with prior surgeries he had no trouble with anesthesia          ALLERGIES:  Allergies no known allergies    CURRENT MEDICATIONS:    Current Outpatient Prescriptions:     aspirin 81 MG tablet, Take 81 mg by mouth, Disp: , Rfl:     bimatoprost (LUMIGAN) 0 01 % ophthalmic drops, Apply 1 drop to eye, Disp: , Rfl:   carvedilol (COREG) 12 5 mg tablet, Take 18 75 mg by mouth, Disp: , Rfl:     furosemide (LASIX) 20 mg tablet, Take 1 tablet by mouth 2 (two) times a day, Disp: , Rfl:     lisinopril (ZESTRIL) 5 mg tablet, Take 5 mg by mouth, Disp: , Rfl:     Omega-3 Fatty Acids (FISH OIL PO), Take 2,400 mg by mouth, Disp: , Rfl:     potassium chloride (KLOR-CON) 20 mEq packet, Take 1 packet by mouth daily, Disp: , Rfl:     rosuvastatin (CRESTOR) 20 MG tablet, TAKE 1 TABLET DAILY, Disp: 90 tablet, Rfl: 0    leuprolide (ELIGARD) 45 MG injection, Inject 45 mg under the skin, Disp: , Rfl:     ACTIVE PROBLEM LIST:  Patient Active Problem List   Diagnosis    Chronic systolic heart failure (HCC)    Essential hypertension    H/O aortic valve replacement with tissue graft    Hyperlipidemia    Hypertension, benign    Impaired fasting glucose    Iron deficiency anemia       PAST MEDICAL HISTORY:  No past medical history on file  PAST SURGICAL HISTORY:  Past Surgical History:   Procedure Laterality Date    AORTIC VALVE REPLACEMENT      CARDIAC DEFIBRILLATOR PLACEMENT      pulse generator    PROSTATECTOMY         FAMILY HISTORY:  Family History   Problem Relation Age of Onset    Emphysema Mother     Substance Abuse Other        SOCIAL HISTORY:  Social History     Social History    Marital status: /Civil Union     Spouse name: N/A    Number of children: N/A    Years of education: N/A     Occupational History    retired       Social History Main Topics    Smoking status: Never Smoker    Smokeless tobacco: Not on file    Alcohol use Yes      Comment: social    Drug use: No    Sexual activity: Not on file      Comment: denied: history of high risk sexual behavior     Other Topics Concern    Not on file     Social History Narrative    No narrative on file       Review of Systems   Constitutional: Negative for fever  HENT: Negative for congestion  Eyes: Positive for visual disturbance  Respiratory: Negative for shortness of breath  Cardiovascular: Negative for chest pain  Gastrointestinal: Negative for abdominal pain  Genitourinary: Negative for frequency  Musculoskeletal: Negative for arthralgias  Skin: Negative for rash  Neurological: Negative for headaches  Psychiatric/Behavioral: Negative for dysphoric mood  The patient is not nervous/anxious  Objective:  Vitals:    05/18/18 1338   BP: 124/72   BP Location: Left arm   Patient Position: Sitting   Pulse: 82   SpO2: 96%   Weight: 99 7 kg (219 lb 12 8 oz)   Height: 5' 7" (1 702 m)        Physical Exam   Constitutional: He is oriented to person, place, and time  He appears well-developed and well-nourished  HENT:   Right Ear: External ear normal    Left Ear: External ear normal    Mouth/Throat: Oropharynx is clear and moist  No oropharyngeal exudate  Eyes: Conjunctivae are normal  Pupils are equal, round, and reactive to light  Neck: Carotid bruit is not present  Cardiovascular: Normal rate, regular rhythm, normal heart sounds and intact distal pulses  Pulmonary/Chest: Effort normal and breath sounds normal  He has no wheezes  He has no rales  Abdominal: Soft  There is no tenderness  Musculoskeletal: He exhibits no edema  Lymphadenopathy:     He has no cervical adenopathy  Neurological: He is alert and oriented to person, place, and time  Skin: No rash noted  Psychiatric: He has a normal mood and affect  Nursing note and vitals reviewed  RESULTS:    No results found for this or any previous visit (from the past 1008 hour(s))  This note was created with voice recognition software  Phonic, grammatical and spelling errors may be present within the note as a result

## 2018-06-22 ENCOUNTER — TRANSCRIBE ORDERS (OUTPATIENT)
Dept: ADMINISTRATIVE | Facility: HOSPITAL | Age: 83
End: 2018-06-22

## 2018-06-22 ENCOUNTER — APPOINTMENT (OUTPATIENT)
Dept: LAB | Facility: CLINIC | Age: 83
End: 2018-06-22
Payer: MEDICARE

## 2018-06-22 DIAGNOSIS — C61 MALIGNANT NEOPLASM OF PROSTATE (HCC): ICD-10-CM

## 2018-06-22 DIAGNOSIS — I10 ESSENTIAL HYPERTENSION, MALIGNANT: ICD-10-CM

## 2018-06-22 DIAGNOSIS — I10 ESSENTIAL (PRIMARY) HYPERTENSION: ICD-10-CM

## 2018-06-22 DIAGNOSIS — C61 MALIGNANT NEOPLASM OF PROSTATE (HCC): Primary | ICD-10-CM

## 2018-06-22 LAB
ALBUMIN SERPL BCP-MCNC: 3.4 G/DL (ref 3.5–5)
ALP SERPL-CCNC: 59 U/L (ref 46–116)
ALT SERPL W P-5'-P-CCNC: 22 U/L (ref 12–78)
ANION GAP SERPL CALCULATED.3IONS-SCNC: 9 MMOL/L (ref 4–13)
AST SERPL W P-5'-P-CCNC: 18 U/L (ref 5–45)
BASOPHILS # BLD AUTO: 0.04 THOUSANDS/ΜL (ref 0–0.1)
BASOPHILS NFR BLD AUTO: 1 % (ref 0–1)
BILIRUB SERPL-MCNC: 0.6 MG/DL (ref 0.2–1)
BUN SERPL-MCNC: 22 MG/DL (ref 5–25)
CALCIUM SERPL-MCNC: 8.9 MG/DL (ref 8.3–10.1)
CHLORIDE SERPL-SCNC: 107 MMOL/L (ref 100–108)
CHOLEST SERPL-MCNC: 135 MG/DL (ref 50–200)
CO2 SERPL-SCNC: 24 MMOL/L (ref 21–32)
CREAT SERPL-MCNC: 0.83 MG/DL (ref 0.6–1.3)
EOSINOPHIL # BLD AUTO: 0.15 THOUSAND/ΜL (ref 0–0.61)
EOSINOPHIL NFR BLD AUTO: 2 % (ref 0–6)
ERYTHROCYTE [DISTWIDTH] IN BLOOD BY AUTOMATED COUNT: 13.1 % (ref 11.6–15.1)
GFR SERPL CREATININE-BSD FRML MDRD: 81 ML/MIN/1.73SQ M
GLUCOSE P FAST SERPL-MCNC: 107 MG/DL (ref 65–99)
HCT VFR BLD AUTO: 36.5 % (ref 36.5–49.3)
HDLC SERPL-MCNC: 53 MG/DL (ref 40–60)
HGB BLD-MCNC: 12 G/DL (ref 12–17)
IMM GRANULOCYTES # BLD AUTO: 0.01 THOUSAND/UL (ref 0–0.2)
IMM GRANULOCYTES NFR BLD AUTO: 0 % (ref 0–2)
LDLC SERPL CALC-MCNC: 67 MG/DL (ref 0–100)
LYMPHOCYTES # BLD AUTO: 1.77 THOUSANDS/ΜL (ref 0.6–4.47)
LYMPHOCYTES NFR BLD AUTO: 29 % (ref 14–44)
MCH RBC QN AUTO: 31 PG (ref 26.8–34.3)
MCHC RBC AUTO-ENTMCNC: 32.9 G/DL (ref 31.4–37.4)
MCV RBC AUTO: 94 FL (ref 82–98)
MONOCYTES # BLD AUTO: 0.62 THOUSAND/ΜL (ref 0.17–1.22)
MONOCYTES NFR BLD AUTO: 10 % (ref 4–12)
NEUTROPHILS # BLD AUTO: 3.57 THOUSANDS/ΜL (ref 1.85–7.62)
NEUTS SEG NFR BLD AUTO: 58 % (ref 43–75)
NONHDLC SERPL-MCNC: 82 MG/DL
NRBC BLD AUTO-RTO: 0 /100 WBCS
PLATELET # BLD AUTO: 148 THOUSANDS/UL (ref 149–390)
PMV BLD AUTO: 11.2 FL (ref 8.9–12.7)
POTASSIUM SERPL-SCNC: 4.3 MMOL/L (ref 3.5–5.3)
PROT SERPL-MCNC: 7 G/DL (ref 6.4–8.2)
PSA SERPL-MCNC: <0.1 NG/ML (ref 0–4)
RBC # BLD AUTO: 3.87 MILLION/UL (ref 3.88–5.62)
SODIUM SERPL-SCNC: 140 MMOL/L (ref 136–145)
TRIGL SERPL-MCNC: 75 MG/DL
WBC # BLD AUTO: 6.16 THOUSAND/UL (ref 4.31–10.16)

## 2018-06-22 PROCEDURE — 80053 COMPREHEN METABOLIC PANEL: CPT

## 2018-06-22 PROCEDURE — 85025 COMPLETE CBC W/AUTO DIFF WBC: CPT

## 2018-06-22 PROCEDURE — 80061 LIPID PANEL: CPT

## 2018-06-22 PROCEDURE — 84153 ASSAY OF PSA TOTAL: CPT

## 2018-06-28 ENCOUNTER — OFFICE VISIT (OUTPATIENT)
Dept: INTERNAL MEDICINE CLINIC | Facility: CLINIC | Age: 83
End: 2018-06-28
Payer: MEDICARE

## 2018-06-28 VITALS
BODY MASS INDEX: 34.69 KG/M2 | HEIGHT: 67 IN | HEART RATE: 79 BPM | DIASTOLIC BLOOD PRESSURE: 60 MMHG | SYSTOLIC BLOOD PRESSURE: 120 MMHG | WEIGHT: 221 LBS | OXYGEN SATURATION: 94 %

## 2018-06-28 DIAGNOSIS — E78.2 MIXED HYPERLIPIDEMIA: ICD-10-CM

## 2018-06-28 DIAGNOSIS — I50.22 CHRONIC SYSTOLIC HEART FAILURE (HCC): ICD-10-CM

## 2018-06-28 DIAGNOSIS — H25.13 AGE-RELATED NUCLEAR CATARACT OF BOTH EYES: ICD-10-CM

## 2018-06-28 DIAGNOSIS — R73.01 IMPAIRED FASTING GLUCOSE: ICD-10-CM

## 2018-06-28 DIAGNOSIS — I10 ESSENTIAL HYPERTENSION: Primary | ICD-10-CM

## 2018-06-28 PROCEDURE — 99214 OFFICE O/P EST MOD 30 MIN: CPT | Performed by: INTERNAL MEDICINE

## 2018-06-28 NOTE — PROGRESS NOTES
Assessment/Plan:      Chronic problems are stable  Continue present medications  Continue diet and exercise  Ordered labs for next visit  Return in about 4 months (around 10/28/2018)  No problem-specific Assessment & Plan notes found for this encounter  Diagnoses and all orders for this visit:    Essential hypertension  -     CBC and differential; Future  -     Comprehensive metabolic panel; Future  -     Lipid panel; Future    Chronic systolic heart failure (HCC)    Mixed hyperlipidemia    Age-related nuclear cataract of both eyes    Impaired fasting glucose  -     Hemoglobin A1C; Future          Subjective:      Patient ID: Maddie Gómez is a 80 y o  male  Patient comes in today for routine follow-up  He states he is doing well with no new complaints  His eye surgery went well without complications  He also had stents placed for his eyes and the glaucoma  He is now off his drops  His blood pressure is controlled  Heart disease is stable  He continues to follow with Cardiology  Continues to watch his diet for the sugar and that remains borderline          ALLERGIES:  No Known Allergies    CURRENT MEDICATIONS:    Current Outpatient Prescriptions:     aspirin 81 MG tablet, Take 81 mg by mouth, Disp: , Rfl:     carvedilol (COREG) 12 5 mg tablet, Take 18 75 mg by mouth, Disp: , Rfl:     furosemide (LASIX) 20 mg tablet, Take 1 tablet by mouth 2 (two) times a day, Disp: , Rfl:     leuprolide (ELIGARD) 45 MG injection, Inject 45 mg under the skin, Disp: , Rfl:     lisinopril (ZESTRIL) 5 mg tablet, Take 5 mg by mouth, Disp: , Rfl:     Omega-3 Fatty Acids (FISH OIL PO), Take 2,400 mg by mouth, Disp: , Rfl:     potassium chloride (KLOR-CON) 20 mEq packet, Take 1 packet by mouth daily, Disp: , Rfl:     rosuvastatin (CRESTOR) 20 MG tablet, Take 1 tablet (20 mg total) by mouth daily, Disp: 90 tablet, Rfl: 3    ACTIVE PROBLEM LIST:  Patient Active Problem List   Diagnosis    Chronic systolic heart failure ([facility] [address] )    Essential hypertension    H/O aortic valve replacement with tissue graft    Hyperlipidemia    Impaired fasting glucose    Iron deficiency anemia       PAST MEDICAL HISTORY:  No past medical history on file  PAST SURGICAL HISTORY:  Past Surgical History:   Procedure Laterality Date    AORTIC VALVE REPLACEMENT      CARDIAC DEFIBRILLATOR PLACEMENT      pulse generator    CATARACT EXTRACTION, BILATERAL Bilateral     PROSTATECTOMY         FAMILY HISTORY:  Family History   Problem Relation Age of Onset    Emphysema Mother     Substance Abuse Other        SOCIAL HISTORY:  Social History     Social History    Marital status: /Civil Union     Spouse name: N/A    Number of children: N/A    Years of education: N/A     Occupational History    retired       Social History Main Topics    Smoking status: Never Smoker    Smokeless tobacco: Not on file    Alcohol use Yes      Comment: social    Drug use: No    Sexual activity: Not on file      Comment: denied: history of high risk sexual behavior     Other Topics Concern    Not on file     Social History Narrative    No narrative on file       Review of Systems   Respiratory: Negative for shortness of breath  Cardiovascular: Negative for chest pain  Gastrointestinal: Negative for abdominal pain  Objective:  Vitals:    06/28/18 1323   BP: 120/60   BP Location: Left arm   Patient Position: Sitting   Pulse: 79   SpO2: 94%   Weight: 100 kg (221 lb)   Height: 5' 7" (1 702 m)        Physical Exam   Constitutional: He is oriented to person, place, and time  He appears well-developed and well-nourished  Cardiovascular: Normal rate, regular rhythm and normal heart sounds  Pulmonary/Chest: Effort normal and breath sounds normal    Abdominal: Soft  Bowel sounds are normal    Musculoskeletal: He exhibits no edema  Neurological: He is alert and oriented to person, place, and time     Nursing note and vitals reviewed          RESULTS:    Recent Results (from the past 1008 hour(s))   PSA Total, Diagnostic    Collection Time: 06/22/18  9:17 AM   Result Value Ref Range    PSA <0 1 0 0 - 4 0 ng/mL   CBC and differential    Collection Time: 06/22/18  9:17 AM   Result Value Ref Range    WBC 6 16 4 31 - 10 16 Thousand/uL    RBC 3 87 (L) 3 88 - 5 62 Million/uL    Hemoglobin 12 0 12 0 - 17 0 g/dL    Hematocrit 36 5 36 5 - 49 3 %    MCV 94 82 - 98 fL    MCH 31 0 26 8 - 34 3 pg    MCHC 32 9 31 4 - 37 4 g/dL    RDW 13 1 11 6 - 15 1 %    MPV 11 2 8 9 - 12 7 fL    Platelets 559 (L) 077 - 390 Thousands/uL    nRBC 0 /100 WBCs    Neutrophils Relative 58 43 - 75 %    Immat GRANS % 0 0 - 2 %    Lymphocytes Relative 29 14 - 44 %    Monocytes Relative 10 4 - 12 %    Eosinophils Relative 2 0 - 6 %    Basophils Relative 1 0 - 1 %    Neutrophils Absolute 3 57 1 85 - 7 62 Thousands/µL    Immature Grans Absolute 0 01 0 00 - 0 20 Thousand/uL    Lymphocytes Absolute 1 77 0 60 - 4 47 Thousands/µL    Monocytes Absolute 0 62 0 17 - 1 22 Thousand/µL    Eosinophils Absolute 0 15 0 00 - 0 61 Thousand/µL    Basophils Absolute 0 04 0 00 - 0 10 Thousands/µL   Comprehensive metabolic panel    Collection Time: 06/22/18  9:17 AM   Result Value Ref Range    Sodium 140 136 - 145 mmol/L    Potassium 4 3 3 5 - 5 3 mmol/L    Chloride 107 100 - 108 mmol/L    CO2 24 21 - 32 mmol/L    Anion Gap 9 4 - 13 mmol/L    BUN 22 5 - 25 mg/dL    Creatinine 0 83 0 60 - 1 30 mg/dL    Glucose, Fasting 107 (H) 65 - 99 mg/dL    Calcium 8 9 8 3 - 10 1 mg/dL    AST 18 5 - 45 U/L    ALT 22 12 - 78 U/L    Alkaline Phosphatase 59 46 - 116 U/L    Total Protein 7 0 6 4 - 8 2 g/dL    Albumin 3 4 (L) 3 5 - 5 0 g/dL    Total Bilirubin 0 60 0 20 - 1 00 mg/dL    eGFR 81 ml/min/1 73sq m   Lipid panel    Collection Time: 06/22/18  9:17 AM   Result Value Ref Range    Cholesterol 135 50 - 200 mg/dL    Triglycerides 75 <=150 mg/dL    HDL, Direct 53 40 - 60 mg/dL    LDL Calculated 67 0 - 100 mg/dL Non-HDL-Chol (CHOL-HDL) 82 mg/dl       This note was created with voice recognition software  Phonic, grammatical and spelling errors may be present within the note as a result

## 2018-10-22 ENCOUNTER — APPOINTMENT (OUTPATIENT)
Dept: LAB | Facility: CLINIC | Age: 83
End: 2018-10-22
Payer: MEDICARE

## 2018-10-22 DIAGNOSIS — R73.01 IMPAIRED FASTING GLUCOSE: ICD-10-CM

## 2018-10-22 DIAGNOSIS — I10 ESSENTIAL HYPERTENSION: ICD-10-CM

## 2018-10-22 LAB
ALBUMIN SERPL BCP-MCNC: 3.5 G/DL (ref 3.5–5)
ALP SERPL-CCNC: 61 U/L (ref 46–116)
ALT SERPL W P-5'-P-CCNC: 21 U/L (ref 12–78)
ANION GAP SERPL CALCULATED.3IONS-SCNC: 6 MMOL/L (ref 4–13)
AST SERPL W P-5'-P-CCNC: 17 U/L (ref 5–45)
BASOPHILS # BLD AUTO: 0.04 THOUSANDS/ΜL (ref 0–0.1)
BASOPHILS NFR BLD AUTO: 1 % (ref 0–1)
BILIRUB SERPL-MCNC: 0.51 MG/DL (ref 0.2–1)
BUN SERPL-MCNC: 15 MG/DL (ref 5–25)
CALCIUM SERPL-MCNC: 8.4 MG/DL (ref 8.3–10.1)
CHLORIDE SERPL-SCNC: 101 MMOL/L (ref 100–108)
CHOLEST SERPL-MCNC: 136 MG/DL (ref 50–200)
CO2 SERPL-SCNC: 28 MMOL/L (ref 21–32)
CREAT SERPL-MCNC: 0.84 MG/DL (ref 0.6–1.3)
EOSINOPHIL # BLD AUTO: 0.14 THOUSAND/ΜL (ref 0–0.61)
EOSINOPHIL NFR BLD AUTO: 2 % (ref 0–6)
ERYTHROCYTE [DISTWIDTH] IN BLOOD BY AUTOMATED COUNT: 12.9 % (ref 11.6–15.1)
EST. AVERAGE GLUCOSE BLD GHB EST-MCNC: 137 MG/DL
GFR SERPL CREATININE-BSD FRML MDRD: 81 ML/MIN/1.73SQ M
GLUCOSE P FAST SERPL-MCNC: 110 MG/DL (ref 65–99)
HBA1C MFR BLD: 6.4 % (ref 4.2–6.3)
HCT VFR BLD AUTO: 37.7 % (ref 36.5–49.3)
HDLC SERPL-MCNC: 57 MG/DL (ref 40–60)
HGB BLD-MCNC: 12.4 G/DL (ref 12–17)
IMM GRANULOCYTES # BLD AUTO: 0.02 THOUSAND/UL (ref 0–0.2)
IMM GRANULOCYTES NFR BLD AUTO: 0 % (ref 0–2)
LDLC SERPL CALC-MCNC: 60 MG/DL (ref 0–100)
LYMPHOCYTES # BLD AUTO: 1.78 THOUSANDS/ΜL (ref 0.6–4.47)
LYMPHOCYTES NFR BLD AUTO: 27 % (ref 14–44)
MCH RBC QN AUTO: 30.7 PG (ref 26.8–34.3)
MCHC RBC AUTO-ENTMCNC: 32.9 G/DL (ref 31.4–37.4)
MCV RBC AUTO: 93 FL (ref 82–98)
MONOCYTES # BLD AUTO: 0.63 THOUSAND/ΜL (ref 0.17–1.22)
MONOCYTES NFR BLD AUTO: 10 % (ref 4–12)
NEUTROPHILS # BLD AUTO: 3.91 THOUSANDS/ΜL (ref 1.85–7.62)
NEUTS SEG NFR BLD AUTO: 60 % (ref 43–75)
NONHDLC SERPL-MCNC: 79 MG/DL
NRBC BLD AUTO-RTO: 0 /100 WBCS
PLATELET # BLD AUTO: 160 THOUSANDS/UL (ref 149–390)
PMV BLD AUTO: 10.9 FL (ref 8.9–12.7)
POTASSIUM SERPL-SCNC: 4.1 MMOL/L (ref 3.5–5.3)
PROT SERPL-MCNC: 7 G/DL (ref 6.4–8.2)
RBC # BLD AUTO: 4.04 MILLION/UL (ref 3.88–5.62)
SODIUM SERPL-SCNC: 135 MMOL/L (ref 136–145)
TRIGL SERPL-MCNC: 94 MG/DL
WBC # BLD AUTO: 6.52 THOUSAND/UL (ref 4.31–10.16)

## 2018-10-22 PROCEDURE — 36415 COLL VENOUS BLD VENIPUNCTURE: CPT

## 2018-10-22 PROCEDURE — 83036 HEMOGLOBIN GLYCOSYLATED A1C: CPT

## 2018-10-22 PROCEDURE — 80053 COMPREHEN METABOLIC PANEL: CPT

## 2018-10-22 PROCEDURE — 85025 COMPLETE CBC W/AUTO DIFF WBC: CPT

## 2018-10-22 PROCEDURE — 80061 LIPID PANEL: CPT

## 2018-10-29 ENCOUNTER — OFFICE VISIT (OUTPATIENT)
Dept: INTERNAL MEDICINE CLINIC | Facility: CLINIC | Age: 83
End: 2018-10-29
Payer: MEDICARE

## 2018-10-29 VITALS
HEART RATE: 84 BPM | BODY MASS INDEX: 34.47 KG/M2 | SYSTOLIC BLOOD PRESSURE: 118 MMHG | WEIGHT: 219.6 LBS | OXYGEN SATURATION: 96 % | DIASTOLIC BLOOD PRESSURE: 58 MMHG | HEIGHT: 67 IN

## 2018-10-29 DIAGNOSIS — I10 ESSENTIAL HYPERTENSION: Primary | ICD-10-CM

## 2018-10-29 DIAGNOSIS — E78.2 MIXED HYPERLIPIDEMIA: ICD-10-CM

## 2018-10-29 DIAGNOSIS — Z23 NEED FOR INFLUENZA VACCINATION: ICD-10-CM

## 2018-10-29 DIAGNOSIS — R73.01 IMPAIRED FASTING GLUCOSE: ICD-10-CM

## 2018-10-29 PROCEDURE — 99214 OFFICE O/P EST MOD 30 MIN: CPT | Performed by: INTERNAL MEDICINE

## 2018-10-29 PROCEDURE — G0008 ADMIN INFLUENZA VIRUS VAC: HCPCS | Performed by: INTERNAL MEDICINE

## 2018-10-29 PROCEDURE — 90662 IIV NO PRSV INCREASED AG IM: CPT | Performed by: INTERNAL MEDICINE

## 2018-10-29 NOTE — PROGRESS NOTES
Assessment/Plan:      Chronic problems are stable  Continue present medications  Continue diet and exercise  Ordered labs for next visit  Return in about 4 months (around 2/28/2019)  No problem-specific Assessment & Plan notes found for this encounter  Diagnoses and all orders for this visit:    Essential hypertension  -     CBC and differential; Future  -     Comprehensive metabolic panel; Future  -     Lipid panel; Future    Impaired fasting glucose  -     Hemoglobin A1C; Future    Mixed hyperlipidemia    Need for influenza vaccination  -     influenza vaccine, 3655-7613, high-dose, PF 0 5 mL, for patients 65 yr+ (FLUZONE HIGH-DOSE)          Subjective:      Patient ID: Andi López is a 80 y o  male  Patient comes in today for routine follow-up  He states he is doing okay with no new complaints  He does report that his cardiologist has ordered an echo to recheck his valve  He notes some more shortness of breath with exertion  They also tell him his pacemaker may require a battery in the next year and half  His blood pressure appears controlled  His blood work is within acceptable limits  Sugar remains borderline  He is watching his diet  Cholesterol remains well controlled  No other complaints  No further additions to his history          ALLERGIES:  No Known Allergies    CURRENT MEDICATIONS:    Current Outpatient Prescriptions:     aspirin 81 MG tablet, Take 81 mg by mouth, Disp: , Rfl:     carvedilol (COREG) 12 5 mg tablet, Take 18 75 mg by mouth, Disp: , Rfl:     furosemide (LASIX) 20 mg tablet, Take 1 tablet by mouth 2 (two) times a day, Disp: , Rfl:     leuprolide (ELIGARD) 45 MG injection, Inject 45 mg under the skin, Disp: , Rfl:     lisinopril (ZESTRIL) 5 mg tablet, Take 5 mg by mouth, Disp: , Rfl:     Omega-3 Fatty Acids (FISH OIL PO), Take 2,400 mg by mouth, Disp: , Rfl:     potassium chloride (KLOR-CON) 20 mEq packet, Take 1 packet by mouth daily, Disp: , Rfl:    rosuvastatin (CRESTOR) 20 MG tablet, Take 1 tablet (20 mg total) by mouth daily, Disp: 90 tablet, Rfl: 3    ACTIVE PROBLEM LIST:  Patient Active Problem List   Diagnosis    Chronic systolic heart failure (HCC)    Essential hypertension    H/O aortic valve replacement with tissue graft    Mixed hyperlipidemia    Impaired fasting glucose    Iron deficiency anemia       PAST MEDICAL HISTORY:  No past medical history on file  PAST SURGICAL HISTORY:  Past Surgical History:   Procedure Laterality Date    AORTIC VALVE REPLACEMENT      CARDIAC DEFIBRILLATOR PLACEMENT      pulse generator    CATARACT EXTRACTION, BILATERAL Bilateral     PROSTATECTOMY         FAMILY HISTORY:  Family History   Problem Relation Age of Onset    Emphysema Mother     Substance Abuse Other        SOCIAL HISTORY:  Social History     Social History    Marital status: /Civil Union     Spouse name: N/A    Number of children: N/A    Years of education: N/A     Occupational History    retired       Social History Main Topics    Smoking status: Never Smoker    Smokeless tobacco: Not on file    Alcohol use Yes      Comment: social    Drug use: No    Sexual activity: Not on file      Comment: denied: history of high risk sexual behavior     Other Topics Concern    Not on file     Social History Narrative    No narrative on file       Review of Systems   Respiratory: Positive for shortness of breath  Negative for cough  Cardiovascular: Negative for chest pain and leg swelling  Gastrointestinal: Negative for abdominal pain  Objective:  Vitals:    10/29/18 1042   BP: 118/58   BP Location: Left arm   Patient Position: Sitting   Pulse: 84   SpO2: 96%   Weight: 99 6 kg (219 lb 9 6 oz)   Height: 5' 7" (1 702 m)        Physical Exam   Constitutional: He is oriented to person, place, and time  He appears well-developed and well-nourished     Cardiovascular: Normal rate, regular rhythm and normal heart sounds  Pulmonary/Chest: Effort normal and breath sounds normal    Abdominal: Soft  Bowel sounds are normal    Musculoskeletal: He exhibits no edema  Neurological: He is alert and oriented to person, place, and time  Nursing note and vitals reviewed          RESULTS:    Recent Results (from the past 1008 hour(s))   CBC and differential    Collection Time: 10/22/18  8:59 AM   Result Value Ref Range    WBC 6 52 4 31 - 10 16 Thousand/uL    RBC 4 04 3 88 - 5 62 Million/uL    Hemoglobin 12 4 12 0 - 17 0 g/dL    Hematocrit 37 7 36 5 - 49 3 %    MCV 93 82 - 98 fL    MCH 30 7 26 8 - 34 3 pg    MCHC 32 9 31 4 - 37 4 g/dL    RDW 12 9 11 6 - 15 1 %    MPV 10 9 8 9 - 12 7 fL    Platelets 842 328 - 031 Thousands/uL    nRBC 0 /100 WBCs    Neutrophils Relative 60 43 - 75 %    Immat GRANS % 0 0 - 2 %    Lymphocytes Relative 27 14 - 44 %    Monocytes Relative 10 4 - 12 %    Eosinophils Relative 2 0 - 6 %    Basophils Relative 1 0 - 1 %    Neutrophils Absolute 3 91 1 85 - 7 62 Thousands/µL    Immature Grans Absolute 0 02 0 00 - 0 20 Thousand/uL    Lymphocytes Absolute 1 78 0 60 - 4 47 Thousands/µL    Monocytes Absolute 0 63 0 17 - 1 22 Thousand/µL    Eosinophils Absolute 0 14 0 00 - 0 61 Thousand/µL    Basophils Absolute 0 04 0 00 - 0 10 Thousands/µL   Comprehensive metabolic panel    Collection Time: 10/22/18  8:59 AM   Result Value Ref Range    Sodium 135 (L) 136 - 145 mmol/L    Potassium 4 1 3 5 - 5 3 mmol/L    Chloride 101 100 - 108 mmol/L    CO2 28 21 - 32 mmol/L    ANION GAP 6 4 - 13 mmol/L    BUN 15 5 - 25 mg/dL    Creatinine 0 84 0 60 - 1 30 mg/dL    Glucose, Fasting 110 (H) 65 - 99 mg/dL    Calcium 8 4 8 3 - 10 1 mg/dL    AST 17 5 - 45 U/L    ALT 21 12 - 78 U/L    Alkaline Phosphatase 61 46 - 116 U/L    Total Protein 7 0 6 4 - 8 2 g/dL    Albumin 3 5 3 5 - 5 0 g/dL    Total Bilirubin 0 51 0 20 - 1 00 mg/dL    eGFR 81 ml/min/1 73sq m   Hemoglobin A1C    Collection Time: 10/22/18  8:59 AM   Result Value Ref Range Hemoglobin A1C 6 4 (H) 4 2 - 6 3 %     mg/dl   Lipid panel    Collection Time: 10/22/18  8:59 AM   Result Value Ref Range    Cholesterol 136 50 - 200 mg/dL    Triglycerides 94 <=150 mg/dL    HDL, Direct 57 40 - 60 mg/dL    LDL Calculated 60 0 - 100 mg/dL    Non-HDL-Chol (CHOL-HDL) 79 mg/dl       This note was created with voice recognition software  Phonic, grammatical and spelling errors may be present within the note as a result

## 2018-12-03 ENCOUNTER — TELEPHONE (OUTPATIENT)
Dept: INTERNAL MEDICINE CLINIC | Facility: CLINIC | Age: 83
End: 2018-12-03

## 2018-12-03 NOTE — TELEPHONE ENCOUNTER
Patient stated with a sore throat on Friday 11/30 so went to pharmacy and got OTC Coricidin (for people with high blood pressure) patient has taken 5 tab so far over a course 3 days so far  Patient wants to know if he should keep taking? Patient said the runny nose has not gotten better  Please advise    Clare Lr

## 2019-01-04 ENCOUNTER — APPOINTMENT (OUTPATIENT)
Dept: LAB | Facility: CLINIC | Age: 84
End: 2019-01-04
Payer: MEDICARE

## 2019-01-04 ENCOUNTER — TRANSCRIBE ORDERS (OUTPATIENT)
Dept: ADMINISTRATIVE | Facility: HOSPITAL | Age: 84
End: 2019-01-04

## 2019-01-04 DIAGNOSIS — C61 MALIGNANT NEOPLASM OF PROSTATE (HCC): Primary | ICD-10-CM

## 2019-01-04 DIAGNOSIS — C61 MALIGNANT NEOPLASM OF PROSTATE (HCC): ICD-10-CM

## 2019-01-04 LAB — PSA SERPL-MCNC: <0.1 NG/ML (ref 0–4)

## 2019-01-04 PROCEDURE — 84153 ASSAY OF PSA TOTAL: CPT

## 2019-02-22 ENCOUNTER — APPOINTMENT (OUTPATIENT)
Dept: LAB | Facility: CLINIC | Age: 84
End: 2019-02-22
Payer: MEDICARE

## 2019-02-22 DIAGNOSIS — R73.01 IMPAIRED FASTING GLUCOSE: ICD-10-CM

## 2019-02-22 DIAGNOSIS — I10 ESSENTIAL HYPERTENSION: ICD-10-CM

## 2019-02-22 LAB
ALBUMIN SERPL BCP-MCNC: 3.7 G/DL (ref 3.5–5)
ALP SERPL-CCNC: 60 U/L (ref 46–116)
ALT SERPL W P-5'-P-CCNC: 20 U/L (ref 12–78)
ANION GAP SERPL CALCULATED.3IONS-SCNC: 8 MMOL/L (ref 4–13)
AST SERPL W P-5'-P-CCNC: 16 U/L (ref 5–45)
BASOPHILS # BLD AUTO: 0.05 THOUSANDS/ΜL (ref 0–0.1)
BASOPHILS NFR BLD AUTO: 1 % (ref 0–1)
BILIRUB SERPL-MCNC: 0.58 MG/DL (ref 0.2–1)
BUN SERPL-MCNC: 18 MG/DL (ref 5–25)
CALCIUM SERPL-MCNC: 8.6 MG/DL (ref 8.3–10.1)
CHLORIDE SERPL-SCNC: 105 MMOL/L (ref 100–108)
CHOLEST SERPL-MCNC: 143 MG/DL (ref 50–200)
CO2 SERPL-SCNC: 25 MMOL/L (ref 21–32)
CREAT SERPL-MCNC: 0.89 MG/DL (ref 0.6–1.3)
EOSINOPHIL # BLD AUTO: 0.23 THOUSAND/ΜL (ref 0–0.61)
EOSINOPHIL NFR BLD AUTO: 3 % (ref 0–6)
ERYTHROCYTE [DISTWIDTH] IN BLOOD BY AUTOMATED COUNT: 13.2 % (ref 11.6–15.1)
EST. AVERAGE GLUCOSE BLD GHB EST-MCNC: 157 MG/DL
GFR SERPL CREATININE-BSD FRML MDRD: 79 ML/MIN/1.73SQ M
GLUCOSE P FAST SERPL-MCNC: 114 MG/DL (ref 65–99)
HBA1C MFR BLD: 7.1 % (ref 4.2–6.3)
HCT VFR BLD AUTO: 35.5 % (ref 36.5–49.3)
HDLC SERPL-MCNC: 57 MG/DL (ref 40–60)
HGB BLD-MCNC: 11.6 G/DL (ref 12–17)
IMM GRANULOCYTES # BLD AUTO: 0.01 THOUSAND/UL (ref 0–0.2)
IMM GRANULOCYTES NFR BLD AUTO: 0 % (ref 0–2)
LDLC SERPL CALC-MCNC: 67 MG/DL (ref 0–100)
LYMPHOCYTES # BLD AUTO: 1.81 THOUSANDS/ΜL (ref 0.6–4.47)
LYMPHOCYTES NFR BLD AUTO: 26 % (ref 14–44)
MCH RBC QN AUTO: 30.9 PG (ref 26.8–34.3)
MCHC RBC AUTO-ENTMCNC: 32.7 G/DL (ref 31.4–37.4)
MCV RBC AUTO: 94 FL (ref 82–98)
MONOCYTES # BLD AUTO: 0.69 THOUSAND/ΜL (ref 0.17–1.22)
MONOCYTES NFR BLD AUTO: 10 % (ref 4–12)
NEUTROPHILS # BLD AUTO: 4.3 THOUSANDS/ΜL (ref 1.85–7.62)
NEUTS SEG NFR BLD AUTO: 60 % (ref 43–75)
NONHDLC SERPL-MCNC: 86 MG/DL
NRBC BLD AUTO-RTO: 0 /100 WBCS
PLATELET # BLD AUTO: 146 THOUSANDS/UL (ref 149–390)
PMV BLD AUTO: 10.8 FL (ref 8.9–12.7)
POTASSIUM SERPL-SCNC: 4.1 MMOL/L (ref 3.5–5.3)
PROT SERPL-MCNC: 6.9 G/DL (ref 6.4–8.2)
RBC # BLD AUTO: 3.76 MILLION/UL (ref 3.88–5.62)
SODIUM SERPL-SCNC: 138 MMOL/L (ref 136–145)
TRIGL SERPL-MCNC: 95 MG/DL
WBC # BLD AUTO: 7.09 THOUSAND/UL (ref 4.31–10.16)

## 2019-02-22 PROCEDURE — 85025 COMPLETE CBC W/AUTO DIFF WBC: CPT

## 2019-02-22 PROCEDURE — 80061 LIPID PANEL: CPT

## 2019-02-22 PROCEDURE — 36415 COLL VENOUS BLD VENIPUNCTURE: CPT

## 2019-02-22 PROCEDURE — 83036 HEMOGLOBIN GLYCOSYLATED A1C: CPT

## 2019-02-22 PROCEDURE — 80053 COMPREHEN METABOLIC PANEL: CPT

## 2019-02-28 ENCOUNTER — OFFICE VISIT (OUTPATIENT)
Dept: INTERNAL MEDICINE CLINIC | Facility: CLINIC | Age: 84
End: 2019-02-28
Payer: MEDICARE

## 2019-02-28 VITALS
HEART RATE: 97 BPM | RESPIRATION RATE: 18 BRPM | OXYGEN SATURATION: 97 % | WEIGHT: 224 LBS | SYSTOLIC BLOOD PRESSURE: 128 MMHG | DIASTOLIC BLOOD PRESSURE: 70 MMHG | HEIGHT: 67 IN | BODY MASS INDEX: 35.16 KG/M2

## 2019-02-28 DIAGNOSIS — R73.01 IMPAIRED FASTING GLUCOSE: ICD-10-CM

## 2019-02-28 DIAGNOSIS — I48.0 PAF (PAROXYSMAL ATRIAL FIBRILLATION) (HCC): ICD-10-CM

## 2019-02-28 DIAGNOSIS — I10 ESSENTIAL HYPERTENSION: Primary | ICD-10-CM

## 2019-02-28 PROBLEM — I25.10 CORONARY ARTERY DISEASE INVOLVING NATIVE CORONARY ARTERY OF NATIVE HEART WITHOUT ANGINA PECTORIS: Status: ACTIVE | Noted: 2019-02-04

## 2019-02-28 PROCEDURE — 99214 OFFICE O/P EST MOD 30 MIN: CPT | Performed by: INTERNAL MEDICINE

## 2019-02-28 RX ORDER — APIXABAN 5 MG/1
5 TABLET, FILM COATED ORAL 2 TIMES DAILY
Refills: 1 | COMMUNITY
Start: 2019-02-05

## 2019-02-28 NOTE — PROGRESS NOTES
Assessment/Plan:     Chronic problems are stable except for the sugar which is probably from the holidays  Continue current medications and he is going to make adjustments in his diet  He thinks he knows when he needs to do  Exercises much as he can  Continue follow-up with Cardiology  BMI Counseling: Body mass index is 35 08 kg/m²  Discussed the patient's BMI with him  The BMI is above average  BMI counseling and education was provided to the patient  Nutrition recommendations include moderation in carbohydrate intake  Return in about 3 months (around 5/28/2019)  No problem-specific Assessment & Plan notes found for this encounter  Diagnoses and all orders for this visit:    Essential hypertension  -     CBC and differential; Future  -     Comprehensive metabolic panel; Future  -     Lipid panel; Future    Impaired fasting glucose  -     Hemoglobin A1C; Future    PAF (paroxysmal atrial fibrillation) (HCC)    Other orders  -     cyanocobalamin 100 MCG tablet; Take 2,000 mcg by mouth  -     Discontinue: apixaban (ELIQUIS) 5 mg; Take 5 mg by mouth  -     ELIQUIS 5 MG; Take 5 mg by mouth 2 (two) times a day  -     lifitegrast (XIIDRA) 5 % op solution; Apply 1 drop to eye          Subjective:      Patient ID: Nazia Douglas is a 80 y o  male  Patient comes in today patient comes in today for routine follow-up  He was recently at Cardiology  His heart disease is stable but he was found to have paroxysmal AFib  Medications were adjusted and that has stabilized  He has had no problems with symptoms  His blood work was good except for his sugar  Probably from the holidays  He admits that he did not follow the diet strictly  Taking his medicines as directed  Reports no new complaints today  No further additions to his history        ALLERGIES:  Allergies   Allergen Reactions    Simvastatin Allergic Rhinitis       CURRENT MEDICATIONS:    Current Outpatient Medications:     aspirin 81 MG tablet, Take 81 mg by mouth, Disp: , Rfl:     carvedilol (COREG) 12 5 mg tablet, Take 18 75 mg by mouth, Disp: , Rfl:     cyanocobalamin 100 MCG tablet, Take 2,000 mcg by mouth, Disp: , Rfl:     ELIQUIS 5 MG, Take 5 mg by mouth 2 (two) times a day, Disp: , Rfl: 1    furosemide (LASIX) 20 mg tablet, Take 1 tablet by mouth 2 (two) times a day, Disp: , Rfl:     leuprolide (ELIGARD) 45 MG injection, Inject 45 mg under the skin, Disp: , Rfl:     lisinopril (ZESTRIL) 5 mg tablet, Take 5 mg by mouth, Disp: , Rfl:     Omega-3 Fatty Acids (FISH OIL PO), Take 2,400 mg by mouth, Disp: , Rfl:     potassium chloride (KLOR-CON) 20 mEq packet, Take 1 packet by mouth daily, Disp: , Rfl:     rosuvastatin (CRESTOR) 20 MG tablet, Take 1 tablet (20 mg total) by mouth daily, Disp: 90 tablet, Rfl: 3    lifitegrast (XIIDRA) 5 % op solution, Apply 1 drop to eye, Disp: , Rfl:     ACTIVE PROBLEM LIST:  Patient Active Problem List   Diagnosis    Chronic systolic heart failure ([address] )    Essential hypertension    H/O aortic valve replacement with tissue graft    Mixed hyperlipidemia    Impaired fasting glucose    Iron deficiency anemia    Coronary artery disease involving native coronary artery of native heart without angina pectoris    PAF (paroxysmal atrial fibrillation) (HCC)       PAST MEDICAL HISTORY:  No past medical history on file      PAST SURGICAL HISTORY:  Past Surgical History:   Procedure Laterality Date    AORTIC VALVE REPLACEMENT      CARDIAC DEFIBRILLATOR PLACEMENT      pulse generator    CATARACT EXTRACTION, BILATERAL Bilateral     PROSTATECTOMY         FAMILY HISTORY:  Family History   Problem Relation Age of Onset    Emphysema Mother     Substance Abuse Other        SOCIAL HISTORY:  Social History     Socioeconomic History    Marital status: /Civil Union     Spouse name: Not on file    Number of children: Not on file    Years of education: Not on file    Highest education level: Not on file Occupational History    Occupation: retired    Social Needs    Financial resource strain: Not on file    Food insecurity:     Worry: Not on file     Inability: Not on file   [employer] needs:     Medical: Not on file     Non-medical: Not on file   Tobacco Use    Smoking status: Never Smoker    Smokeless tobacco: Never Used   Substance and Sexual Activity    Alcohol use: Yes     Comment: social    Drug use: No    Sexual activity: Not on file     Comment: denied: history of high risk sexual behavior   Lifestyle    Physical activity:     Days per week: Not on file     Minutes per session: Not on file    Stress: Not on file   Relationships    Social connections:     Talks on phone: Not on file     Gets together: Not on file     Attends Voodoo service: Not on file     Active member of club or organization: Not on file     Attends meetings of clubs or organizations: Not on file     Relationship status: Not on file    Intimate partner violence:     Fear of current or ex partner: Not on file     Emotionally abused: Not on file     Physically abused: Not on file     Forced sexual activity: Not on file   Other Topics Concern    Not on file   Social History Narrative    Not on file       Review of Systems   Respiratory: Negative for shortness of breath  Cardiovascular: Negative for chest pain  Gastrointestinal: Negative for abdominal pain  Objective:  Vitals:    02/28/19 1143   BP: 128/70   Pulse: 97   Resp: 18   SpO2: 97%   Weight: 102 kg (224 lb)   Height: 5' 7" (1 702 m)     Body mass index is 35 08 kg/m²  Physical Exam   Constitutional: He is oriented to person, place, and time  He appears well-developed and well-nourished  Cardiovascular: Normal rate, regular rhythm and normal heart sounds  Pulmonary/Chest: Effort normal and breath sounds normal    Abdominal: Soft  Bowel sounds are normal    Musculoskeletal: He exhibits no edema     Neurological: He is alert and oriented to person, place, and time  Nursing note and vitals reviewed          RESULTS:    Recent Results (from the past 1008 hour(s))   CBC and differential    Collection Time: 02/22/19  9:00 AM   Result Value Ref Range    WBC 7 09 4 31 - 10 16 Thousand/uL    RBC 3 76 (L) 3 88 - 5 62 Million/uL    Hemoglobin 11 6 (L) 12 0 - 17 0 g/dL    Hematocrit 35 5 (L) 36 5 - 49 3 %    MCV 94 82 - 98 fL    MCH 30 9 26 8 - 34 3 pg    MCHC 32 7 31 4 - 37 4 g/dL    RDW 13 2 11 6 - 15 1 %    MPV 10 8 8 9 - 12 7 fL    Platelets 396 (L) 135 - 390 Thousands/uL    nRBC 0 /100 WBCs    Neutrophils Relative 60 43 - 75 %    Immat GRANS % 0 0 - 2 %    Lymphocytes Relative 26 14 - 44 %    Monocytes Relative 10 4 - 12 %    Eosinophils Relative 3 0 - 6 %    Basophils Relative 1 0 - 1 %    Neutrophils Absolute 4 30 1 85 - 7 62 Thousands/µL    Immature Grans Absolute 0 01 0 00 - 0 20 Thousand/uL    Lymphocytes Absolute 1 81 0 60 - 4 47 Thousands/µL    Monocytes Absolute 0 69 0 17 - 1 22 Thousand/µL    Eosinophils Absolute 0 23 0 00 - 0 61 Thousand/µL    Basophils Absolute 0 05 0 00 - 0 10 Thousands/µL   Comprehensive metabolic panel    Collection Time: 02/22/19  9:00 AM   Result Value Ref Range    Sodium 138 136 - 145 mmol/L    Potassium 4 1 3 5 - 5 3 mmol/L    Chloride 105 100 - 108 mmol/L    CO2 25 21 - 32 mmol/L    ANION GAP 8 4 - 13 mmol/L    BUN 18 5 - 25 mg/dL    Creatinine 0 89 0 60 - 1 30 mg/dL    Glucose, Fasting 114 (H) 65 - 99 mg/dL    Calcium 8 6 8 3 - 10 1 mg/dL    AST 16 5 - 45 U/L    ALT 20 12 - 78 U/L    Alkaline Phosphatase 60 46 - 116 U/L    Total Protein 6 9 6 4 - 8 2 g/dL    Albumin 3 7 3 5 - 5 0 g/dL    Total Bilirubin 0 58 0 20 - 1 00 mg/dL    eGFR 79 ml/min/1 73sq m   Hemoglobin A1C    Collection Time: 02/22/19  9:00 AM   Result Value Ref Range    Hemoglobin A1C 7 1 (H) 4 2 - 6 3 %     mg/dl   Lipid panel    Collection Time: 02/22/19  9:00 AM   Result Value Ref Range    Cholesterol 143 50 - 200 mg/dL Triglycerides 95 <=150 mg/dL    HDL, Direct 57 40 - 60 mg/dL    LDL Calculated 67 0 - 100 mg/dL    Non-HDL-Chol (CHOL-HDL) 86 mg/dl       This note was created with voice recognition software  Phonic, grammatical and spelling errors may be present within the note as a result

## 2019-05-21 ENCOUNTER — APPOINTMENT (OUTPATIENT)
Dept: LAB | Facility: CLINIC | Age: 84
End: 2019-05-21
Payer: MEDICARE

## 2019-05-21 DIAGNOSIS — I10 ESSENTIAL HYPERTENSION: ICD-10-CM

## 2019-05-21 DIAGNOSIS — R73.01 IMPAIRED FASTING GLUCOSE: ICD-10-CM

## 2019-05-21 LAB
ALBUMIN SERPL BCP-MCNC: 3.5 G/DL (ref 3.5–5)
ALP SERPL-CCNC: 60 U/L (ref 46–116)
ALT SERPL W P-5'-P-CCNC: 16 U/L (ref 12–78)
ANION GAP SERPL CALCULATED.3IONS-SCNC: 5 MMOL/L (ref 4–13)
AST SERPL W P-5'-P-CCNC: 20 U/L (ref 5–45)
BASOPHILS # BLD AUTO: 0.04 THOUSANDS/ΜL (ref 0–0.1)
BASOPHILS NFR BLD AUTO: 1 % (ref 0–1)
BILIRUB SERPL-MCNC: 0.56 MG/DL (ref 0.2–1)
BUN SERPL-MCNC: 18 MG/DL (ref 5–25)
CALCIUM SERPL-MCNC: 8.6 MG/DL (ref 8.3–10.1)
CHLORIDE SERPL-SCNC: 109 MMOL/L (ref 100–108)
CHOLEST SERPL-MCNC: 135 MG/DL (ref 50–200)
CO2 SERPL-SCNC: 25 MMOL/L (ref 21–32)
CREAT SERPL-MCNC: 0.91 MG/DL (ref 0.6–1.3)
EOSINOPHIL # BLD AUTO: 0.18 THOUSAND/ΜL (ref 0–0.61)
EOSINOPHIL NFR BLD AUTO: 3 % (ref 0–6)
ERYTHROCYTE [DISTWIDTH] IN BLOOD BY AUTOMATED COUNT: 13.2 % (ref 11.6–15.1)
EST. AVERAGE GLUCOSE BLD GHB EST-MCNC: 140 MG/DL
GFR SERPL CREATININE-BSD FRML MDRD: 77 ML/MIN/1.73SQ M
GLUCOSE P FAST SERPL-MCNC: 119 MG/DL (ref 65–99)
HBA1C MFR BLD: 6.5 % (ref 4.2–6.3)
HCT VFR BLD AUTO: 35.8 % (ref 36.5–49.3)
HDLC SERPL-MCNC: 56 MG/DL (ref 40–60)
HGB BLD-MCNC: 11.6 G/DL (ref 12–17)
IMM GRANULOCYTES # BLD AUTO: 0.01 THOUSAND/UL (ref 0–0.2)
IMM GRANULOCYTES NFR BLD AUTO: 0 % (ref 0–2)
LDLC SERPL CALC-MCNC: 61 MG/DL (ref 0–100)
LYMPHOCYTES # BLD AUTO: 1.67 THOUSANDS/ΜL (ref 0.6–4.47)
LYMPHOCYTES NFR BLD AUTO: 26 % (ref 14–44)
MCH RBC QN AUTO: 30.6 PG (ref 26.8–34.3)
MCHC RBC AUTO-ENTMCNC: 32.4 G/DL (ref 31.4–37.4)
MCV RBC AUTO: 95 FL (ref 82–98)
MONOCYTES # BLD AUTO: 0.66 THOUSAND/ΜL (ref 0.17–1.22)
MONOCYTES NFR BLD AUTO: 10 % (ref 4–12)
NEUTROPHILS # BLD AUTO: 3.98 THOUSANDS/ΜL (ref 1.85–7.62)
NEUTS SEG NFR BLD AUTO: 60 % (ref 43–75)
NONHDLC SERPL-MCNC: 79 MG/DL
NRBC BLD AUTO-RTO: 0 /100 WBCS
PLATELET # BLD AUTO: 139 THOUSANDS/UL (ref 149–390)
PMV BLD AUTO: 10.8 FL (ref 8.9–12.7)
POTASSIUM SERPL-SCNC: 4.4 MMOL/L (ref 3.5–5.3)
PROT SERPL-MCNC: 6.9 G/DL (ref 6.4–8.2)
RBC # BLD AUTO: 3.79 MILLION/UL (ref 3.88–5.62)
SODIUM SERPL-SCNC: 139 MMOL/L (ref 136–145)
TRIGL SERPL-MCNC: 89 MG/DL
WBC # BLD AUTO: 6.54 THOUSAND/UL (ref 4.31–10.16)

## 2019-05-21 PROCEDURE — 80061 LIPID PANEL: CPT

## 2019-05-21 PROCEDURE — 83036 HEMOGLOBIN GLYCOSYLATED A1C: CPT

## 2019-05-21 PROCEDURE — 80053 COMPREHEN METABOLIC PANEL: CPT

## 2019-05-21 PROCEDURE — 36415 COLL VENOUS BLD VENIPUNCTURE: CPT

## 2019-05-21 PROCEDURE — 85025 COMPLETE CBC W/AUTO DIFF WBC: CPT

## 2019-05-29 ENCOUNTER — OFFICE VISIT (OUTPATIENT)
Dept: INTERNAL MEDICINE CLINIC | Facility: CLINIC | Age: 84
End: 2019-05-29
Payer: MEDICARE

## 2019-05-29 VITALS
DIASTOLIC BLOOD PRESSURE: 68 MMHG | SYSTOLIC BLOOD PRESSURE: 138 MMHG | OXYGEN SATURATION: 97 % | WEIGHT: 219 LBS | HEIGHT: 67 IN | BODY MASS INDEX: 34.37 KG/M2 | HEART RATE: 68 BPM | RESPIRATION RATE: 20 BRPM

## 2019-05-29 DIAGNOSIS — R73.01 IMPAIRED FASTING GLUCOSE: ICD-10-CM

## 2019-05-29 DIAGNOSIS — I50.22 CHRONIC SYSTOLIC HEART FAILURE (HCC): Primary | ICD-10-CM

## 2019-05-29 DIAGNOSIS — E78.2 MIXED HYPERLIPIDEMIA: ICD-10-CM

## 2019-05-29 DIAGNOSIS — Z23 ENCOUNTER FOR IMMUNIZATION: ICD-10-CM

## 2019-05-29 DIAGNOSIS — I10 ESSENTIAL HYPERTENSION: ICD-10-CM

## 2019-05-29 PROCEDURE — 90732 PPSV23 VACC 2 YRS+ SUBQ/IM: CPT | Performed by: INTERNAL MEDICINE

## 2019-05-29 PROCEDURE — G0009 ADMIN PNEUMOCOCCAL VACCINE: HCPCS | Performed by: INTERNAL MEDICINE

## 2019-05-29 PROCEDURE — G0439 PPPS, SUBSEQ VISIT: HCPCS | Performed by: INTERNAL MEDICINE

## 2019-05-29 PROCEDURE — 99214 OFFICE O/P EST MOD 30 MIN: CPT | Performed by: INTERNAL MEDICINE

## 2019-07-20 DIAGNOSIS — E78.2 MIXED HYPERLIPIDEMIA: ICD-10-CM

## 2019-07-22 RX ORDER — ROSUVASTATIN CALCIUM 20 MG/1
TABLET, COATED ORAL
Qty: 90 TABLET | Refills: 3 | Status: SHIPPED | OUTPATIENT
Start: 2019-07-22 | End: 2020-07-14

## 2019-07-26 ENCOUNTER — HOSPITAL ENCOUNTER (OUTPATIENT)
Dept: RADIOLOGY | Facility: HOSPITAL | Age: 84
Discharge: HOME | End: 2019-07-26
Attending: INTERNAL MEDICINE
Payer: MEDICARE

## 2019-07-26 ENCOUNTER — OFFICE VISIT (OUTPATIENT)
Dept: INTERNAL MEDICINE CLINIC | Facility: CLINIC | Age: 84
End: 2019-07-26
Payer: MEDICARE

## 2019-07-26 VITALS
BODY MASS INDEX: 33.9 KG/M2 | HEIGHT: 67 IN | RESPIRATION RATE: 18 BRPM | WEIGHT: 216 LBS | DIASTOLIC BLOOD PRESSURE: 60 MMHG | OXYGEN SATURATION: 97 % | SYSTOLIC BLOOD PRESSURE: 102 MMHG | HEART RATE: 99 BPM

## 2019-07-26 DIAGNOSIS — M79.604 PAIN OF RIGHT LOWER EXTREMITY: ICD-10-CM

## 2019-07-26 DIAGNOSIS — Z79.899 HIGH RISK MEDICATIONS (NOT ANTICOAGULANTS) LONG-TERM USE: ICD-10-CM

## 2019-07-26 DIAGNOSIS — M79.604 PAIN OF RIGHT LOWER EXTREMITY: Primary | ICD-10-CM

## 2019-07-26 PROCEDURE — 99213 OFFICE O/P EST LOW 20 MIN: CPT | Performed by: INTERNAL MEDICINE

## 2019-07-26 PROCEDURE — 73502 X-RAY EXAM HIP UNI 2-3 VIEWS: CPT

## 2019-07-26 PROCEDURE — 72100 X-RAY EXAM L-S SPINE 2/3 VWS: CPT

## 2019-07-26 NOTE — PROGRESS NOTES
Assessment/Plan:     Suspect this is from his back  Will order x-rays of his spine and hip  Most likely after that, we can start with physical therapy  He also is due for a bone density test because of his chronic Lupron injections with his urologist        Return for Next scheduled follow up  No problem-specific Assessment & Plan notes found for this encounter  Diagnoses and all orders for this visit:    Pain of right lower extremity  -     XR spine lumbar 2 or 3 views injury; Future  -     XR hip/pelv 2-3 vws right if performed; Future    High risk medications (not anticoagulants) long-term use  -     DXA bone density spine hip and pelvis; Future          Subjective:      Patient ID: Sonam Panchal is a 80 y o  male  Patient comes in today complaining of a few weeks of pain, sometimes sharp, located on his right lateral thigh  He points to an area in a band along his thigh  Sometimes the pain goes all the way up into his hip  He knows he does have some trouble with his hip as well because he gets groin pain with rotation of the hip at times  Denies any recent trauma  His back is not bothering him and his knee is not bothering him        ALLERGIES:  Allergies   Allergen Reactions    Simvastatin Allergic Rhinitis       CURRENT MEDICATIONS:    Current Outpatient Medications:     aspirin 81 MG tablet, Take 81 mg by mouth, Disp: , Rfl:     carvedilol (COREG) 12 5 mg tablet, Take 18 75 mg by mouth, Disp: , Rfl:     cyanocobalamin 100 MCG tablet, Take 2,000 mcg by mouth, Disp: , Rfl:     ELIQUIS 5 MG, Take 5 mg by mouth 2 (two) times a day, Disp: , Rfl: 1    furosemide (LASIX) 20 mg tablet, Take 1 tablet by mouth 2 (two) times a day, Disp: , Rfl:     leuprolide (ELIGARD) 45 MG injection, Inject 45 mg under the skin, Disp: , Rfl:     lifitegrast (XIIDRA) 5 % op solution, Apply 1 drop to eye, Disp: , Rfl:     lisinopril (ZESTRIL) 5 mg tablet, Take 5 mg by mouth, Disp: , Rfl:     Omega-3 Fatty Acids (FISH OIL PO), Take 2,400 mg by mouth, Disp: , Rfl:     potassium chloride (KLOR-CON) 20 mEq packet, Take 1 packet by mouth daily, Disp: , Rfl:     rosuvastatin (CRESTOR) 20 MG tablet, TAKE 1 TABLET DAILY, Disp: 90 tablet, Rfl: 3    ACTIVE PROBLEM LIST:  Patient Active Problem List   Diagnosis    Chronic systolic heart failure (HCC)    Essential hypertension    H/O aortic valve replacement with tissue graft    Mixed hyperlipidemia    Impaired fasting glucose    Iron deficiency anemia    Coronary artery disease involving native coronary artery of native heart without angina pectoris    PAF (paroxysmal atrial fibrillation) (HCC)       PAST MEDICAL HISTORY:  No past medical history on file      PAST SURGICAL HISTORY:  Past Surgical History:   Procedure Laterality Date    AORTIC VALVE REPLACEMENT      CARDIAC DEFIBRILLATOR PLACEMENT      pulse generator    CATARACT EXTRACTION, BILATERAL Bilateral     PROSTATECTOMY         FAMILY HISTORY:  Family History   Problem Relation Age of Onset    Emphysema Mother     Substance Abuse Other        SOCIAL HISTORY:  Social History     Socioeconomic History    Marital status: /Civil Union     Spouse name: Not on file    Number of children: Not on file    Years of education: Not on file    Highest education level: Not on file   Occupational History    Occupation: retired    Social Needs    Financial resource strain: Not on file    Food insecurity:     Worry: Not on file     Inability: Not on file   [employer] needs:     Medical: Not on file     Non-medical: Not on file   Tobacco Use    Smoking status: Never Smoker    Smokeless tobacco: Never Used   Substance and Sexual Activity    Alcohol use: Yes     Comment: social    Drug use: No    Sexual activity: Not on file     Comment: denied: history of high risk sexual behavior   Lifestyle    Physical activity:     Days per week: Not on file     Minutes per session: Not on file  Stress: Not on file   Relationships    Social connections:     Talks on phone: Not on file     Gets together: Not on file     Attends Zoroastrian service: Not on file     Active member of club or organization: Not on file     Attends meetings of clubs or organizations: Not on file     Relationship status: Not on file    Intimate partner violence:     Fear of current or ex partner: Not on file     Emotionally abused: Not on file     Physically abused: Not on file     Forced sexual activity: Not on file   Other Topics Concern    Not on file   Social History Narrative    Not on file       Review of Systems   Constitutional: Negative for fever  Musculoskeletal: Positive for joint swelling  Negative for back pain  Objective:  Vitals:    07/26/19 0934   BP: 102/60   BP Location: Left arm   Patient Position: Sitting   Cuff Size: Standard   Pulse: 99   Resp: 18   SpO2: 97%   Weight: 98 kg (216 lb)   Height: 5' 7" (1 702 m)     Body mass index is 33 83 kg/m²  Physical Exam   Constitutional: He appears well-developed and well-nourished  Musculoskeletal:   Normal gait  Full range of motion of the hip without repeated symptoms  Nursing note and vitals reviewed  RESULTS:    No results found for this or any previous visit (from the past 1008 hour(s))  This note was created with voice recognition software  Phonic, grammatical and spelling errors may be present within the note as a result

## 2019-08-01 ENCOUNTER — TELEPHONE (OUTPATIENT)
Dept: INTERNAL MEDICINE CLINIC | Facility: CLINIC | Age: 84
End: 2019-08-01

## 2019-08-01 NOTE — TELEPHONE ENCOUNTER
Patient called to get the xray results that he had done on 7/26  Patient said that he is still having a lot of pain  Please advise        Contact # [phone]

## 2019-08-07 ENCOUNTER — HOSPITAL ENCOUNTER (OUTPATIENT)
Dept: MAMMOGRAPHY | Facility: CLINIC | Age: 84
Discharge: HOME | End: 2019-08-07
Payer: MEDICARE

## 2019-08-07 DIAGNOSIS — Z79.899 HIGH RISK MEDICATIONS (NOT ANTICOAGULANTS) LONG-TERM USE: ICD-10-CM

## 2019-08-07 PROCEDURE — 77080 DXA BONE DENSITY AXIAL: CPT

## 2019-09-23 ENCOUNTER — APPOINTMENT (OUTPATIENT)
Dept: LAB | Facility: CLINIC | Age: 84
End: 2019-09-23
Payer: MEDICARE

## 2019-09-23 DIAGNOSIS — R73.01 IMPAIRED FASTING GLUCOSE: ICD-10-CM

## 2019-09-23 DIAGNOSIS — I10 ESSENTIAL HYPERTENSION: ICD-10-CM

## 2019-09-23 LAB
ALBUMIN SERPL BCP-MCNC: 4.1 G/DL (ref 3.5–5)
ALP SERPL-CCNC: 57 U/L (ref 46–116)
ALT SERPL W P-5'-P-CCNC: 19 U/L (ref 12–78)
ANION GAP SERPL CALCULATED.3IONS-SCNC: 8 MMOL/L (ref 4–13)
AST SERPL W P-5'-P-CCNC: 15 U/L (ref 5–45)
BASOPHILS # BLD AUTO: 0.05 THOUSANDS/ΜL (ref 0–0.1)
BASOPHILS NFR BLD AUTO: 1 % (ref 0–1)
BILIRUB SERPL-MCNC: 0.5 MG/DL (ref 0.2–1)
BUN SERPL-MCNC: 18 MG/DL (ref 5–25)
CALCIUM SERPL-MCNC: 9.2 MG/DL (ref 8.3–10.1)
CHLORIDE SERPL-SCNC: 107 MMOL/L (ref 100–108)
CHOLEST SERPL-MCNC: 148 MG/DL (ref 50–200)
CO2 SERPL-SCNC: 26 MMOL/L (ref 21–32)
CREAT SERPL-MCNC: 0.89 MG/DL (ref 0.6–1.3)
EOSINOPHIL # BLD AUTO: 0.14 THOUSAND/ΜL (ref 0–0.61)
EOSINOPHIL NFR BLD AUTO: 2 % (ref 0–6)
ERYTHROCYTE [DISTWIDTH] IN BLOOD BY AUTOMATED COUNT: 13.3 % (ref 11.6–15.1)
EST. AVERAGE GLUCOSE BLD GHB EST-MCNC: 128 MG/DL
GFR SERPL CREATININE-BSD FRML MDRD: 79 ML/MIN/1.73SQ M
GLUCOSE P FAST SERPL-MCNC: 116 MG/DL (ref 65–99)
HBA1C MFR BLD: 6.1 % (ref 4.2–6.3)
HCT VFR BLD AUTO: 36.5 % (ref 36.5–49.3)
HDLC SERPL-MCNC: 59 MG/DL (ref 40–60)
HGB BLD-MCNC: 12 G/DL (ref 12–17)
IMM GRANULOCYTES # BLD AUTO: 0.02 THOUSAND/UL (ref 0–0.2)
IMM GRANULOCYTES NFR BLD AUTO: 0 % (ref 0–2)
LDLC SERPL CALC-MCNC: 71 MG/DL (ref 0–100)
LYMPHOCYTES # BLD AUTO: 1.54 THOUSANDS/ΜL (ref 0.6–4.47)
LYMPHOCYTES NFR BLD AUTO: 24 % (ref 14–44)
MCH RBC QN AUTO: 31.2 PG (ref 26.8–34.3)
MCHC RBC AUTO-ENTMCNC: 32.9 G/DL (ref 31.4–37.4)
MCV RBC AUTO: 95 FL (ref 82–98)
MONOCYTES # BLD AUTO: 0.62 THOUSAND/ΜL (ref 0.17–1.22)
MONOCYTES NFR BLD AUTO: 10 % (ref 4–12)
NEUTROPHILS # BLD AUTO: 4.17 THOUSANDS/ΜL (ref 1.85–7.62)
NEUTS SEG NFR BLD AUTO: 63 % (ref 43–75)
NONHDLC SERPL-MCNC: 89 MG/DL
NRBC BLD AUTO-RTO: 0 /100 WBCS
PLATELET # BLD AUTO: 163 THOUSANDS/UL (ref 149–390)
PMV BLD AUTO: 11.1 FL (ref 8.9–12.7)
POTASSIUM SERPL-SCNC: 4.1 MMOL/L (ref 3.5–5.3)
PROT SERPL-MCNC: 7.1 G/DL (ref 6.4–8.2)
RBC # BLD AUTO: 3.85 MILLION/UL (ref 3.88–5.62)
SODIUM SERPL-SCNC: 141 MMOL/L (ref 136–145)
TRIGL SERPL-MCNC: 90 MG/DL
WBC # BLD AUTO: 6.54 THOUSAND/UL (ref 4.31–10.16)

## 2019-09-23 PROCEDURE — 36415 COLL VENOUS BLD VENIPUNCTURE: CPT

## 2019-09-23 PROCEDURE — 83036 HEMOGLOBIN GLYCOSYLATED A1C: CPT

## 2019-09-23 PROCEDURE — 85025 COMPLETE CBC W/AUTO DIFF WBC: CPT

## 2019-09-23 PROCEDURE — 80061 LIPID PANEL: CPT

## 2019-09-23 PROCEDURE — 80053 COMPREHEN METABOLIC PANEL: CPT

## 2019-10-01 ENCOUNTER — OFFICE VISIT (OUTPATIENT)
Dept: INTERNAL MEDICINE CLINIC | Facility: CLINIC | Age: 84
End: 2019-10-01
Payer: MEDICARE

## 2019-10-01 VITALS
OXYGEN SATURATION: 96 % | DIASTOLIC BLOOD PRESSURE: 60 MMHG | RESPIRATION RATE: 20 BRPM | HEIGHT: 67 IN | SYSTOLIC BLOOD PRESSURE: 130 MMHG | BODY MASS INDEX: 34.21 KG/M2 | HEART RATE: 74 BPM | WEIGHT: 218 LBS

## 2019-10-01 DIAGNOSIS — Z23 NEED FOR VACCINATION: Primary | ICD-10-CM

## 2019-10-01 DIAGNOSIS — I50.22 CHRONIC SYSTOLIC HEART FAILURE (HCC): ICD-10-CM

## 2019-10-01 DIAGNOSIS — R73.01 IMPAIRED FASTING GLUCOSE: ICD-10-CM

## 2019-10-01 DIAGNOSIS — I10 ESSENTIAL HYPERTENSION: ICD-10-CM

## 2019-10-01 PROCEDURE — G0008 ADMIN INFLUENZA VIRUS VAC: HCPCS | Performed by: INTERNAL MEDICINE

## 2019-10-01 PROCEDURE — 90662 IIV NO PRSV INCREASED AG IM: CPT | Performed by: INTERNAL MEDICINE

## 2019-10-01 PROCEDURE — 99214 OFFICE O/P EST MOD 30 MIN: CPT | Performed by: INTERNAL MEDICINE

## 2019-10-01 NOTE — PROGRESS NOTES
Assessment/Plan:     Chronic problems appear stable  Would continue current medications  Continue follow-up with Cardiology  Keep working on diet  If his leg and hip pains recur, he will follow-up with Ortho  Quality Measures:       Return in about 4 months (around 2/1/2020)  No problem-specific Assessment & Plan notes found for this encounter  Diagnoses and all orders for this visit:    Need for vaccination  -     influenza vaccine, high-dose, PF 0 5 mL    Chronic systolic heart failure (HCC)    Impaired fasting glucose  -     Hemoglobin A1C; Future    Essential hypertension  -     CBC and differential; Future  -     Comprehensive metabolic panel; Future  -     Lipid panel; Future          Subjective:      Patient ID: Florentino Sloan is a 80 y o  male  Patient comes in today for routine follow-up  He states he is doing okay  His heart disease is stable but he reports that his pacer is going to need a new battery within the next 6 months  His leg/hip pains have gone away  He thinks it was related to an old recliner that was given to him  Blood pressures are controlled  Sugars are doing better  He is watching his diet  Denies any other complaints today  No further additions to his history        ALLERGIES:  Allergies   Allergen Reactions    Simvastatin Allergic Rhinitis       CURRENT MEDICATIONS:    Current Outpatient Medications:     aspirin 81 MG tablet, Take 81 mg by mouth, Disp: , Rfl:     carvedilol (COREG) 12 5 mg tablet, Take 18 75 mg by mouth, Disp: , Rfl:     cyanocobalamin 100 MCG tablet, Take 2,000 mcg by mouth, Disp: , Rfl:     ELIQUIS 5 MG, Take 5 mg by mouth 2 (two) times a day, Disp: , Rfl: 1    furosemide (LASIX) 20 mg tablet, Take 1 tablet by mouth 2 (two) times a day, Disp: , Rfl:     leuprolide (ELIGARD) 45 MG injection, Inject 45 mg under the skin, Disp: , Rfl:     lifitegrast (XIIDRA) 5 % op solution, Apply 1 drop to eye, Disp: , Rfl:     lisinopril (ZESTRIL) 5 mg tablet, Take 5 mg by mouth, Disp: , Rfl:     Omega-3 Fatty Acids (FISH OIL PO), Take 2,400 mg by mouth, Disp: , Rfl:     potassium chloride (KLOR-CON) 20 mEq packet, Take 1 packet by mouth daily, Disp: , Rfl:     rosuvastatin (CRESTOR) 20 MG tablet, TAKE 1 TABLET DAILY, Disp: 90 tablet, Rfl: 3    ACTIVE PROBLEM LIST:  Patient Active Problem List   Diagnosis    Chronic systolic heart failure (HCC)    Essential hypertension    H/O aortic valve replacement with tissue graft    Mixed hyperlipidemia    Impaired fasting glucose    Iron deficiency anemia    Coronary artery disease involving native coronary artery of native heart without angina pectoris    [facility]oxysmal [facility] fibrillation[facility])       PAST MEDICAL HISTORY:  History reviewed  No pertinent past medical history      PAST SURGICAL HISTORY:  Past Surgical History:   Procedure Laterality Date    AORTIC VALVE REPLACEMENT      CARDIAC DEFIBRILLATOR PLACEMENT      pulse generator    CATARACT EXTRACTION, BILATERAL Bilateral     PROSTATECTOMY         FAMILY HISTORY:  Family History   Problem Relation Age of Onset    Emphysema Mother     Substance Abuse Other        SOCIAL HISTORY:  Social History     Socioeconomic History    Marital status: /Civil Union     Spouse name: Not on file    Number of children: Not on file    Years of education: Not on file    Highest education level: Not on file   Occupational History    Occupation: retired    Social Needs    Financial resource strain: Not on file    Food insecurity:     Worry: Not on file     Inability: Not on file   [place] needs:     Medical: Not on file     Non-medical: Not on file   Tobacco Use    Smoking status: Never Smoker    Smokeless tobacco: Never Used   Substance and Sexual Activity    Alcohol use: Yes     Comment: social    Drug use: No    Sexual activity: Not on file     Comment: denied: history of high risk sexual behavior   Lifestyle    Physical activity:     Days per week: Not on file     Minutes per session: Not on file    Stress: Not on file   Relationships    Social connections:     Talks on phone: Not on file     Gets together: Not on file     Attends [employer] service: Not on file     Active member of club or organization: Not on file     Attends meetings of clubs or organizations: Not on file     Relationship status: Not on file    Intimate partner violence:     Fear of current or ex partner: Not on file     Emotionally abused: Not on file     Physically abused: Not on file     Forced sexual activity: Not on file   Other Topics Concern    Not on file   Social History Narrative    Not on file       Review of Systems   Respiratory: Negative for shortness of breath  Cardiovascular: Negative for chest pain  Gastrointestinal: Negative for abdominal pain  Objective:  Vitals:    10/01/19 1024   BP: 130/60   BP Location: Left arm   Patient Position: Sitting   Cuff Size: Large   Pulse: 74   Resp: 20   SpO2: 96%   Weight: 98 9 kg (218 lb)   Height: 5' 7" (1 702 m)     Body mass index is 34 14 kg/m²  Physical Exam   Constitutional: He is oriented to person, place, and time  He appears well-developed and well-nourished  Cardiovascular: Normal rate, regular rhythm and normal heart sounds  Pulmonary/Chest: Effort normal and breath sounds normal    Abdominal: Soft  There is no tenderness  Musculoskeletal: He exhibits no edema  Neurological: He is alert and oriented to person, place, and time  Nursing note and vitals reviewed          RESULTS:    Recent Results (from the past 1008 hour(s))   Comprehensive metabolic panel    Collection Time: 09/23/19  8:33 AM   Result Value Ref Range    Sodium 141 136 - 145 mmol/L    Potassium 4 1 3 5 - 5 3 mmol/L    Chloride 107 100 - 108 mmol/L    CO2 26 21 - 32 mmol/L    ANION GAP 8 4 - 13 mmol/L    BUN 18 5 - 25 mg/dL    Creatinine 0 89 0 60 - 1 30 mg/dL    Glucose, Fasting 116 (H) 65 - 99 mg/dL    Calcium 9 2 8 3 - 10 1 mg/dL    AST 15 5 - 45 U/L    ALT 19 12 - 78 U/L    Alkaline Phosphatase 57 46 - 116 U/L    Total Protein 7 1 6 4 - 8 2 g/dL    Albumin 4 1 3 5 - 5 0 g/dL    Total Bilirubin 0 50 0 20 - 1 00 mg/dL    eGFR 79 ml/min/1 73sq m   CBC and differential    Collection Time: 09/23/19  8:33 AM   Result Value Ref Range    WBC 6 54 4 31 - 10 16 Thousand/uL    RBC 3 85 (L) 3 88 - 5 62 Million/uL    Hemoglobin 12 0 12 0 - 17 0 g/dL    Hematocrit 36 5 36 5 - 49 3 %    MCV 95 82 - 98 fL    MCH 31 2 26 8 - 34 3 pg    MCHC 32 9 31 4 - 37 4 g/dL    RDW 13 3 11 6 - 15 1 %    MPV 11 1 8 9 - 12 7 fL    Platelets 170 394 - 636 Thousands/uL    nRBC 0 /100 WBCs    Neutrophils Relative 63 43 - 75 %    Immat GRANS % 0 0 - 2 %    Lymphocytes Relative 24 14 - 44 %    Monocytes Relative 10 4 - 12 %    Eosinophils Relative 2 0 - 6 %    Basophils Relative 1 0 - 1 %    Neutrophils Absolute 4 17 1 85 - 7 62 Thousands/µL    Immature Grans Absolute 0 02 0 00 - 0 20 Thousand/uL    Lymphocytes Absolute 1 54 0 60 - 4 47 Thousands/µL    Monocytes Absolute 0 62 0 17 - 1 22 Thousand/µL    Eosinophils Absolute 0 14 0 00 - 0 61 Thousand/µL    Basophils Absolute 0 05 0 00 - 0 10 Thousands/µL   Hemoglobin A1C    Collection Time: 09/23/19  8:33 AM   Result Value Ref Range    Hemoglobin A1C 6 1 4 2 - 6 3 %     mg/dl   Lipid panel    Collection Time: 09/23/19  8:33 AM   Result Value Ref Range    Cholesterol 148 50 - 200 mg/dL    Triglycerides 90 <=150 mg/dL    HDL, Direct 59 40 - 60 mg/dL    LDL Calculated 71 0 - 100 mg/dL    Non-HDL-Chol (CHOL-HDL) 89 mg/dl       This note was created with voice recognition software  Phonic, grammatical and spelling errors may be present within the note as a result

## 2020-01-13 ENCOUNTER — TRANSCRIBE ORDERS (OUTPATIENT)
Dept: ADMINISTRATIVE | Facility: HOSPITAL | Age: 85
End: 2020-01-13

## 2020-01-13 ENCOUNTER — APPOINTMENT (OUTPATIENT)
Dept: LAB | Facility: CLINIC | Age: 85
End: 2020-01-13
Payer: MEDICARE

## 2020-01-13 DIAGNOSIS — C61 MALIGNANT NEOPLASM OF PROSTATE (HCC): Primary | ICD-10-CM

## 2020-01-13 DIAGNOSIS — C61 MALIGNANT NEOPLASM OF PROSTATE (HCC): ICD-10-CM

## 2020-01-13 LAB — PSA SERPL-MCNC: <0.1 NG/ML (ref 0–4)

## 2020-01-13 PROCEDURE — 84153 ASSAY OF PSA TOTAL: CPT

## 2020-01-31 ENCOUNTER — APPOINTMENT (OUTPATIENT)
Dept: LAB | Facility: CLINIC | Age: 85
End: 2020-01-31
Payer: MEDICARE

## 2020-02-04 ENCOUNTER — OFFICE VISIT (OUTPATIENT)
Dept: INTERNAL MEDICINE CLINIC | Facility: CLINIC | Age: 85
End: 2020-02-04
Payer: MEDICARE

## 2020-02-04 VITALS
HEIGHT: 67 IN | OXYGEN SATURATION: 99 % | WEIGHT: 211 LBS | RESPIRATION RATE: 16 BRPM | HEART RATE: 73 BPM | SYSTOLIC BLOOD PRESSURE: 124 MMHG | BODY MASS INDEX: 33.12 KG/M2 | DIASTOLIC BLOOD PRESSURE: 70 MMHG

## 2020-02-04 DIAGNOSIS — I25.10 CORONARY ARTERY DISEASE INVOLVING NATIVE CORONARY ARTERY OF NATIVE HEART WITHOUT ANGINA PECTORIS: ICD-10-CM

## 2020-02-04 DIAGNOSIS — E78.2 MIXED HYPERLIPIDEMIA: ICD-10-CM

## 2020-02-04 DIAGNOSIS — R73.01 IMPAIRED FASTING GLUCOSE: ICD-10-CM

## 2020-02-04 DIAGNOSIS — I50.22 CHRONIC SYSTOLIC HEART FAILURE (HCC): ICD-10-CM

## 2020-02-04 DIAGNOSIS — I10 ESSENTIAL HYPERTENSION: Primary | ICD-10-CM

## 2020-02-04 PROCEDURE — 1036F TOBACCO NON-USER: CPT | Performed by: INTERNAL MEDICINE

## 2020-02-04 PROCEDURE — 3078F DIAST BP <80 MM HG: CPT | Performed by: INTERNAL MEDICINE

## 2020-02-04 PROCEDURE — 1160F RVW MEDS BY RX/DR IN RCRD: CPT | Performed by: INTERNAL MEDICINE

## 2020-02-04 PROCEDURE — 4040F PNEUMOC VAC/ADMIN/RCVD: CPT | Performed by: INTERNAL MEDICINE

## 2020-02-04 PROCEDURE — 3008F BODY MASS INDEX DOCD: CPT | Performed by: INTERNAL MEDICINE

## 2020-02-04 PROCEDURE — 3074F SYST BP LT 130 MM HG: CPT | Performed by: INTERNAL MEDICINE

## 2020-02-04 PROCEDURE — 99214 OFFICE O/P EST MOD 30 MIN: CPT | Performed by: INTERNAL MEDICINE

## 2020-02-04 NOTE — PROGRESS NOTES
Assessment/Plan:          Quality Measures: BMI Counseling: Body mass index is 33 05 kg/m²  The BMI is above normal  Nutrition recommendations include encouraging healthy choices of fruits and vegetables  Return in about 4 months (around 6/4/2020) for Regular visit and Medicare Wellness  No problem-specific Assessment & Plan notes found for this encounter  Diagnoses and all orders for this visit:    Essential hypertension  -     CBC and differential; Future  -     Comprehensive metabolic panel; Future  -     Lipid panel; Future    Coronary artery disease involving native coronary artery of native heart without angina pectoris    Mixed hyperlipidemia    Chronic systolic heart failure (HCC)    Impaired fasting glucose  -     Hemoglobin A1C; Future          Subjective:      Patient ID: Pippa Singh is a 80 y o  male  Patient comes in today for routine follow-up  He states he is doing well  His heart disease is stable but he reports he is being told that he will need his pacemaker battery changed soon  He is also thinking he may need his other hip operated on  His blood pressure is controlled  His cholesterol is controlled  Taking his medicines as directed  Doing well with his sugar  Really watching his diet  He has lost more weight  Again, denies any new complaints today  No further additions to his history        ALLERGIES:  Allergies   Allergen Reactions    Simvastatin Allergic Rhinitis       CURRENT MEDICATIONS:    Current Outpatient Medications:     aspirin 81 MG tablet, Take 81 mg by mouth, Disp: , Rfl:     carvedilol (COREG) 12 5 mg tablet, Take 18 75 mg by mouth, Disp: , Rfl:     cyanocobalamin 100 MCG tablet, Take 2,000 mcg by mouth, Disp: , Rfl:     ELIQUIS 5 MG, Take 5 mg by mouth 2 (two) times a day, Disp: , Rfl: 1    furosemide (LASIX) 20 mg tablet, Take 1 tablet by mouth 2 (two) times a day, Disp: , Rfl:     leuprolide (ELIGARD) 45 MG injection, Inject 45 mg under the skin, Disp: , Rfl:     lifitegrast (XIIDRA) 5 % op solution, Apply 1 drop to eye, Disp: , Rfl:     lisinopril (ZESTRIL) 5 mg tablet, Take 5 mg by mouth, Disp: , Rfl:     Omega-3 Fatty Acids (FISH OIL PO), Take 2,400 mg by mouth, Disp: , Rfl:     potassium chloride (KLOR-CON) 20 mEq packet, Take 1 packet by mouth daily, Disp: , Rfl:     rosuvastatin (CRESTOR) 20 MG tablet, TAKE 1 TABLET DAILY, Disp: 90 tablet, Rfl: 3    ACTIVE PROBLEM LIST:  Patient Active Problem List   Diagnosis    [facility]    Essential hypertension    H/O aortic valve replacement with tissue graft    Mixed hyperlipidemia    Impaired fasting glucose    Iron deficiency anemia    Coronary artery disease involving native coronary artery of native heart without angina pectoris    PAF (paroxysmal atrial fibrillation) ([facility] )       PAST MEDICAL HISTORY:  History reviewed  No pertinent past medical history      PAST SURGICAL HISTORY:  Past Surgical History:   Procedure Laterality Date    AORTIC VALVE REPLACEMENT      CARDIAC DEFIBRILLATOR PLACEMENT      pulse generator    CATARACT EXTRACTION, BILATERAL Bilateral     PROSTATECTOMY         FAMILY HISTORY:  Family History   Problem Relation Age of Onset    Emphysema Mother     Substance Abuse Other        SOCIAL HISTORY:  Social History     Socioeconomic History    Marital status: /Civil Union     Spouse name: Not on file    Number of children: Not on file    Years of education: Not on file    Highest education level: Not on file   Occupational History    Occupation: retired    Social Needs    Financial resource strain: Not on file    Food insecurity:     Worry: Not on file     Inability: Not on file   [employer] needs:     Medical: Not on file     Non-medical: Not on file   Tobacco Use    Smoking status: Never Smoker    Smokeless tobacco: Never Used   Substance and Sexual Activity    Alcohol use: Yes     Comment: social  Drug use: No    Sexual activity: Not on file     Comment: denied: history of high risk sexual behavior   Lifestyle    Physical activity:     Days per week: Not on file     Minutes per session: Not on file    Stress: Not on file   Relationships    Social connections:     Talks on phone: Not on file     Gets together: Not on file     Attends [employer] service: Not on file     Active member of club or organization: Not on file     Attends meetings of clubs or organizations: Not on file     Relationship status: Not on file    Intimate partner violence:     Fear of current or ex partner: Not on file     Emotionally abused: Not on file     Physically abused: Not on file     Forced sexual activity: Not on file   Other Topics Concern    Not on file   Social History Narrative    Not on file       Review of Systems   Respiratory: Negative for shortness of breath  Cardiovascular: Negative for chest pain  Gastrointestinal: Negative for abdominal pain  Objective:  Vitals:    02/04/20 1129   BP: 124/70   BP Location: Left arm   Patient Position: Sitting   Cuff Size: Standard   Pulse: 73   Resp: 16   SpO2: 99%   Weight: 95 7 kg (211 lb)   Height: 5' 7" (1 702 m)     Body mass index is 33 05 kg/m²  Physical Exam   Constitutional: He is oriented to person, place, and time  He appears well-developed and well-nourished  Cardiovascular: Normal rate, regular rhythm and normal heart sounds  Pulmonary/Chest: Effort normal and breath sounds normal    Abdominal: Soft  There is no tenderness  Musculoskeletal: He exhibits no edema  Walks with a cane   Neurological: He is alert and oriented to person, place, and time  Psychiatric: He has a normal mood and affect  Nursing note and vitals reviewed          RESULTS:    Recent Results (from the past 1008 hour(s))   PSA Total, Diagnostic    Collection Time: 01/13/20 11:01 AM   Result Value Ref Range    PSA, Diagnostic <0 1 0 0 - 4 0 ng/mL   CBC and differential    Collection Time: 01/31/20 10:06 AM   Result Value Ref Range    WBC 5 98 4 31 - 10 16 Thousand/uL    RBC 3 82 (L) 3 88 - 5 62 Million/uL    Hemoglobin 11 7 (L) 12 0 - 17 0 g/dL    Hematocrit 35 6 (L) 36 5 - 49 3 %    MCV 93 82 - 98 fL    MCH 30 6 26 8 - 34 3 pg    MCHC 32 9 31 4 - 37 4 g/dL    RDW 13 0 11 6 - 15 1 %    MPV 11 1 8 9 - 12 7 fL    Platelets 238 752 - 358 Thousands/uL    nRBC 0 /100 WBCs    Neutrophils Relative 59 43 - 75 %    Immat GRANS % 0 0 - 2 %    Lymphocytes Relative 27 14 - 44 %    Monocytes Relative 10 4 - 12 %    Eosinophils Relative 3 0 - 6 %    Basophils Relative 1 0 - 1 %    Neutrophils Absolute 3 57 1 85 - 7 62 Thousands/µL    Immature Grans Absolute 0 01 0 00 - 0 20 Thousand/uL    Lymphocytes Absolute 1 60 0 60 - 4 47 Thousands/µL    Monocytes Absolute 0 57 0 17 - 1 22 Thousand/µL    Eosinophils Absolute 0 20 0 00 - 0 61 Thousand/µL    Basophils Absolute 0 03 0 00 - 0 10 Thousands/µL   Comprehensive metabolic panel    Collection Time: 01/31/20 10:06 AM   Result Value Ref Range    Sodium 141 136 - 145 mmol/L    Potassium 4 3 3 5 - 5 3 mmol/L    Chloride 109 (H) 100 - 108 mmol/L    CO2 26 21 - 32 mmol/L    ANION GAP 6 4 - 13 mmol/L    BUN 13 5 - 25 mg/dL    Creatinine 0 85 0 60 - 1 30 mg/dL    Glucose 110 65 - 140 mg/dL    Calcium 9 0 8 3 - 10 1 mg/dL    AST 14 5 - 45 U/L    ALT 17 12 - 78 U/L    Alkaline Phosphatase 58 46 - 116 U/L    Total Protein 7 0 6 4 - 8 2 g/dL    Albumin 3 4 (L) 3 5 - 5 0 g/dL    Total Bilirubin 0 51 0 20 - 1 00 mg/dL    eGFR 79 ml/min/1 73sq m   Lipid panel    Collection Time: 01/31/20 10:06 AM   Result Value Ref Range    Cholesterol 138 50 - 200 mg/dL    Triglycerides 83 <=150 mg/dL    HDL, Direct 54 >=40 mg/dL    LDL Calculated 67 0 - 100 mg/dL    Non-HDL-Chol (CHOL-HDL) 84 mg/dl   Hemoglobin A1C    Collection Time: 01/31/20 10:06 AM   Result Value Ref Range    Hemoglobin A1C 6 1 4 2 - 6 3 %     mg/dl       This note was created with voice recognition software  Phonic, grammatical and spelling errors may be present within the note as a result

## 2020-02-25 ENCOUNTER — APPOINTMENT (OUTPATIENT)
Dept: CT IMAGING | Facility: HOSPITAL | Age: 85
DRG: 071 | End: 2020-02-25
Payer: MEDICARE

## 2020-02-25 ENCOUNTER — HOSPITAL ENCOUNTER (INPATIENT)
Facility: HOSPITAL | Age: 85
LOS: 2 days | Discharge: HOME | DRG: 071 | End: 2020-02-28
Attending: EMERGENCY MEDICINE | Admitting: INTERNAL MEDICINE
Payer: MEDICARE

## 2020-02-25 ENCOUNTER — APPOINTMENT (EMERGENCY)
Dept: CT IMAGING | Facility: HOSPITAL | Age: 85
DRG: 071 | End: 2020-02-25
Payer: MEDICARE

## 2020-02-25 DIAGNOSIS — R41.3 TRANSIENT AMNESIA: Primary | ICD-10-CM

## 2020-02-25 DIAGNOSIS — R73.01 IMPAIRED FASTING GLUCOSE: ICD-10-CM

## 2020-02-25 LAB
ANION GAP SERPL CALCULATED.3IONS-SCNC: 9 MMOL/L (ref 4–13)
APTT PPP: 30 SECONDS (ref 23–37)
ATRIAL RATE: 65 BPM
BACTERIA UR QL AUTO: ABNORMAL /HPF
BILIRUB UR QL STRIP: NEGATIVE
BUN SERPL-MCNC: 21 MG/DL (ref 5–25)
CALCIUM SERPL-MCNC: 9 MG/DL (ref 8.3–10.1)
CHLORIDE SERPL-SCNC: 102 MMOL/L (ref 100–108)
CLARITY UR: CLEAR
CO2 SERPL-SCNC: 28 MMOL/L (ref 21–32)
COLOR UR: YELLOW
CREAT SERPL-MCNC: 0.9 MG/DL (ref 0.6–1.3)
ERYTHROCYTE [DISTWIDTH] IN BLOOD BY AUTOMATED COUNT: 12.9 % (ref 11.6–15.1)
GFR SERPL CREATININE-BSD FRML MDRD: 78 ML/MIN/1.73SQ M
GLUCOSE SERPL-MCNC: 124 MG/DL (ref 65–140)
GLUCOSE UR STRIP-MCNC: NEGATIVE MG/DL
HCT VFR BLD AUTO: 35.2 % (ref 36.5–49.3)
HGB BLD-MCNC: 11.9 G/DL (ref 12–17)
HGB UR QL STRIP.AUTO: ABNORMAL
INR PPP: 1.09 (ref 0.84–1.19)
KETONES UR STRIP-MCNC: NEGATIVE MG/DL
LEUKOCYTE ESTERASE UR QL STRIP: NEGATIVE
MCH RBC QN AUTO: 31.2 PG (ref 26.8–34.3)
MCHC RBC AUTO-ENTMCNC: 33.8 G/DL (ref 31.4–37.4)
MCV RBC AUTO: 92 FL (ref 82–98)
MUCOUS THREADS UR QL AUTO: ABNORMAL
NITRITE UR QL STRIP: NEGATIVE
NON-SQ EPI CELLS URNS QL MICRO: ABNORMAL /HPF
P AXIS: 60 DEGREES
PH UR STRIP.AUTO: 6.5 [PH]
PLATELET # BLD AUTO: 152 THOUSANDS/UL (ref 149–390)
PMV BLD AUTO: 10.3 FL (ref 8.9–12.7)
POTASSIUM SERPL-SCNC: 4.4 MMOL/L (ref 3.5–5.3)
PR INTERVAL: 122 MS
PROT UR STRIP-MCNC: NEGATIVE MG/DL
PROTHROMBIN TIME: 14.1 SECONDS (ref 11.6–14.5)
QRS AXIS: 196 DEGREES
QRSD INTERVAL: 148 MS
QT INTERVAL: 508 MS
QTC INTERVAL: 528 MS
RBC # BLD AUTO: 3.82 MILLION/UL (ref 3.88–5.62)
RBC #/AREA URNS AUTO: ABNORMAL /HPF
SODIUM SERPL-SCNC: 139 MMOL/L (ref 136–145)
SP GR UR STRIP.AUTO: 1.01 (ref 1–1.03)
T WAVE AXIS: 25 DEGREES
TROPONIN I SERPL-MCNC: 0.02 NG/ML
UROBILINOGEN UR QL STRIP.AUTO: 1 E.U./DL
VENTRICULAR RATE: 65 BPM
WBC # BLD AUTO: 7.5 THOUSAND/UL (ref 4.31–10.16)
WBC #/AREA URNS AUTO: ABNORMAL /HPF

## 2020-02-25 PROCEDURE — 99285 EMERGENCY DEPT VISIT HI MDM: CPT | Performed by: EMERGENCY MEDICINE

## 2020-02-25 PROCEDURE — 85610 PROTHROMBIN TIME: CPT | Performed by: EMERGENCY MEDICINE

## 2020-02-25 PROCEDURE — 93005 ELECTROCARDIOGRAM TRACING: CPT

## 2020-02-25 PROCEDURE — 85027 COMPLETE CBC AUTOMATED: CPT | Performed by: EMERGENCY MEDICINE

## 2020-02-25 PROCEDURE — 36415 COLL VENOUS BLD VENIPUNCTURE: CPT | Performed by: EMERGENCY MEDICINE

## 2020-02-25 PROCEDURE — 70496 CT ANGIOGRAPHY HEAD: CPT

## 2020-02-25 PROCEDURE — 81001 URINALYSIS AUTO W/SCOPE: CPT | Performed by: EMERGENCY MEDICINE

## 2020-02-25 PROCEDURE — 85730 THROMBOPLASTIN TIME PARTIAL: CPT | Performed by: EMERGENCY MEDICINE

## 2020-02-25 PROCEDURE — 84484 ASSAY OF TROPONIN QUANT: CPT | Performed by: EMERGENCY MEDICINE

## 2020-02-25 PROCEDURE — 99220 PR INITIAL OBSERVATION CARE/DAY 70 MINUTES: CPT | Performed by: NURSE PRACTITIONER

## 2020-02-25 PROCEDURE — 70450 CT HEAD/BRAIN W/O DYE: CPT

## 2020-02-25 PROCEDURE — 99285 EMERGENCY DEPT VISIT HI MDM: CPT

## 2020-02-25 PROCEDURE — 80048 BASIC METABOLIC PNL TOTAL CA: CPT | Performed by: EMERGENCY MEDICINE

## 2020-02-25 PROCEDURE — 93010 ELECTROCARDIOGRAM REPORT: CPT | Performed by: INTERNAL MEDICINE

## 2020-02-25 RX ORDER — FUROSEMIDE 40 MG/1
40 TABLET ORAL DAILY
Status: DISCONTINUED | OUTPATIENT
Start: 2020-02-26 | End: 2020-02-28 | Stop reason: HOSPADM

## 2020-02-25 RX ORDER — ACETAMINOPHEN 325 MG/1
650 TABLET ORAL EVERY 6 HOURS PRN
Status: DISCONTINUED | OUTPATIENT
Start: 2020-02-25 | End: 2020-02-28 | Stop reason: HOSPADM

## 2020-02-25 RX ORDER — ATORVASTATIN CALCIUM 40 MG/1
40 TABLET, FILM COATED ORAL
Status: DISCONTINUED | OUTPATIENT
Start: 2020-02-25 | End: 2020-02-28 | Stop reason: HOSPADM

## 2020-02-25 RX ORDER — LISINOPRIL 5 MG/1
5 TABLET ORAL DAILY
Status: DISCONTINUED | OUTPATIENT
Start: 2020-02-26 | End: 2020-02-28 | Stop reason: HOSPADM

## 2020-02-25 RX ORDER — ASPIRIN 81 MG/1
81 TABLET, CHEWABLE ORAL 3 TIMES WEEKLY
Status: DISCONTINUED | OUTPATIENT
Start: 2020-02-26 | End: 2020-02-28 | Stop reason: HOSPADM

## 2020-02-25 RX ORDER — ONDANSETRON 2 MG/ML
4 INJECTION INTRAMUSCULAR; INTRAVENOUS EVERY 6 HOURS PRN
Status: DISCONTINUED | OUTPATIENT
Start: 2020-02-25 | End: 2020-02-28 | Stop reason: HOSPADM

## 2020-02-25 RX ADMIN — APIXABAN 5 MG: 5 TABLET, FILM COATED ORAL at 19:51

## 2020-02-25 RX ADMIN — IOHEXOL 73 ML: 350 INJECTION, SOLUTION INTRAVENOUS at 21:05

## 2020-02-25 RX ADMIN — CARVEDILOL 18.75 MG: 12.5 TABLET, FILM COATED ORAL at 20:27

## 2020-02-25 NOTE — H&P
H&P- Jovani Dumont 1934, 80 y o  male MRN: [id number]    Unit/Bed#: FT 04 Encounter: [id number]    Primary Care Provider: Marcellus Aguilar MD   Date and time admitted to hospital: 2/25/2020  1:25 PM        * Transient amnesia  Assessment & Plan  · Patient had a lasting event of an hour and half of being unable to recall events improper days  · Patient appears back at baseline  · Initiate stroke pathway  · Patient had recent ICD placement will need to call [employer] regarding his pacer serial number [id number] and model number [id number]  · Will order MRI brain if not obtainable due to ICD will discontinue  · Further will get an echocardiogram  · Telemetry monitor  · CT head unremarkable  · Will continue aspirin statin  · Patient had a recent lipid panel completed 1/31/2020 total cholesterol 138 triglycerides 83 HDL 54 LDL 67  · Neurology consult pending    PAF (paroxysmal atrial fibrillation) ([address] )  Assessment & Plan  · Rate controlled  · Continue Eliquis  · Continue Coreg    Coronary artery disease involving native coronary artery of native heart without angina pectoris  Assessment & Plan  · Continue ASA and statin    Essential hypertension  Assessment & Plan  · Blood pressure noted  · Continue lisinopril and coreg  · Routine vital monitor    Chronic systolic heart failure (HCC)  Assessment & Plan  Wt Readings from Last 3 Encounters:   02/25/20 95 3 kg (210 lb)   02/04/20 95 7 kg (211 lb)   10/01/19 98 9 kg (218 lb)     No evidence of acute exacerbation  Continue oral Lasix  Daily weight I/O low-sodium diet          VTE Prophylaxis: Apixaban (Eliquis)  / sequential compression device   Code Status:  Full code  POLST: POLST is not applicable to this patient  Discussion with family:  Noted bedside    Anticipated Length of Stay:  Patient will be admitted on an Observation basis with an anticipated length of stay of  < 2 midnights     Justification for Hospital Stay:  Transient amnesia concern for TIA versus CVA    Total Time for Visit, including Counseling / Coordination of Care: 45 minutes  Greater than 50% of this total time spent on direct patient counseling and coordination of care  Chief Complaint:   Had an episode of where a can't remember things    History of Present Illness:    Pippa Singh is a 80 y o  male who presents with history of CHF atrial fibrillation hyperlipidemia hypertension coronary artery disease aortic valve replacement with tissue graft and ICD placement in 2013  Patient reports he has been in his normal state health with no illness when today is family's notice that he could remember dates he was in a conversation with family and when ask questions could not recall any the conversation family called patient's PCP had the granddaughter check facial symmetry, and extremity strength given ongoing amnesia it was recommended patient be brought to the ED for further evaluation  Patient states he remembers talking with his brother-in-law on the phone however cannot recall what they talked about could not recall any date they discuss family showed him a piece of paper even note he made and he could not remember writing that down  Patient reports he never experienced that before  Patient further reports symptoms after the amnesia he reports lightheadedness  Patient denies again any recent illness denies any headache denies any heart palpitations  Patient denies any abdominal symptoms no diarrhea nausea vomiting  Constipation  Review of Systems:    Review of Systems   Constitutional: Negative for activity change, appetite change, chills, fatigue and fever  HENT: Negative  Respiratory: Negative  Cardiovascular: Negative  Negative for palpitations  Gastrointestinal: Negative  Negative for constipation, diarrhea, nausea and vomiting  Genitourinary: Negative  Musculoskeletal: Negative  Neurological: Positive for light-headedness  Negative for dizziness and headaches  Amnesia   Psychiatric/Behavioral: Negative  Past Medical and Surgical History:     History reviewed  No pertinent past medical history  Past Surgical History:   Procedure Laterality Date    AORTIC VALVE REPLACEMENT      CARDIAC DEFIBRILLATOR PLACEMENT      pulse generator    CATARACT EXTRACTION, BILATERAL Bilateral     PROSTATECTOMY         Meds/Allergies:    Prior to Admission medications    Medication Sig Start Date End Date Taking? Authorizing Provider   aspirin 81 MG tablet Take 81 mg by mouth 3 (three) times a week  3/15/16  Yes Historical Provider, MD   carvedilol (COREG) 12 5 mg tablet Take 18 75 mg by mouth 5/30/17  Yes Historical Provider, MD   ELIQUIS 5 MG Take 5 mg by mouth 2 (two) times a day 2/5/19  Yes Historical Provider, MD   furosemide (LASIX) 20 mg tablet Take 40 mg by mouth daily    Yes Historical Provider, MD   leuprolide (ELIGARD) 45 MG injection Inject 45 mg under the skin   Yes Historical Provider, MD   lifitegrast Marita List) 5 % op solution Apply 1 drop to eye   Yes Historical Provider, MD   lisinopril (ZESTRIL) 5 mg tablet Take 5 mg by mouth 2/13/18  Yes Historical Provider, MD   Omega-3 Fatty Acids (FISH OIL PO) Take 2,400 mg by mouth   Yes Historical Provider, MD   potassium chloride (KLOR-CON) 20 mEq packet Take 20 mEq by mouth daily    Yes Historical Provider, MD   rosuvastatin (CRESTOR) 20 MG tablet TAKE 1 TABLET DAILY 7/22/19  Yes Lucho Logan MD   cyanocobalamin 100 MCG tablet Take 2,000 mcg by mouth    Historical Provider, MD     I have reviewed home medications with patient personally  Allergies:    Allergies   Allergen Reactions    Simvastatin Allergic Rhinitis       Social History:     Marital Status: /Civil Union   Occupation:    Patient Pre-hospital Living Situation:    Patient Pre-hospital Level of Mobility:    Patient Pre-hospital Diet Restrictions:    Substance Use History:   Social History     Substance and Sexual Activity   Alcohol Use Yes    Comment: social     Social History     Tobacco Use   Smoking Status Never Smoker   Smokeless Tobacco Never Used     Social History     Substance and Sexual Activity   Drug Use No       Family History:    non-contributory    Physical Exam:     Vitals:   Blood Pressure: 151/66 (02/25/20 1730)  Pulse: 61 (02/25/20 1730)  Temperature: 97 9 °F (36 6 °C) (02/25/20 1324)  Temp Source: Oral (02/25/20 1324)  Respirations: 17 (02/25/20 1730)  Height: 5' 7" (170 2 cm) (02/25/20 1324)  Weight - Scale: 95 3 kg (210 lb) (02/25/20 1324)  SpO2: 96 % (02/25/20 1730)    Physical Exam   Constitutional: He is oriented to person, place, and time  He appears well-developed and well-nourished  HENT:   Head: Normocephalic and atraumatic  Eyes: Conjunctivae and EOM are normal    Neck: Normal range of motion  Cardiovascular: Normal rate, regular rhythm and normal heart sounds  Pulmonary/Chest: Effort normal and breath sounds normal    Abdominal: Soft  Bowel sounds are normal    Musculoskeletal: Normal range of motion  Neurological: He is alert and oriented to person, place, and time  Face symmetrical bilateral upper strong strength 5/5 bilateral lower extremity strength 5/5  Patient alert and oriented x3   Skin: Skin is warm and dry  Psychiatric: He has a normal mood and affect  His behavior is normal            Additional Data:     Lab Results: I have personally reviewed pertinent reports  Results from last 7 days   Lab Units 02/25/20  1504   WBC Thousand/uL 7 50   HEMOGLOBIN g/dL 11 9*   HEMATOCRIT % 35 2*   PLATELETS Thousands/uL 152     Results from last 7 days   Lab Units 02/25/20  1504   SODIUM mmol/L 139   POTASSIUM mmol/L 4 4   CHLORIDE mmol/L 102   CO2 mmol/L 28   BUN mg/dL 21   CREATININE mg/dL 0 90   ANION GAP mmol/L 9   CALCIUM mg/dL 9 0   GLUCOSE RANDOM mg/dL 124     Results from last 7 days   Lab Units 02/25/20  1504   INR  1 09                   Imaging: I have personally reviewed pertinent reports        CT head without contrast   Final Result by Kong Garland MD (02/25 7861)      No acute intracranial abnormality  Workstation performed: [id number]             EKG, Pathology, and Other Studies Reviewed on Admission:   · EKG:      Allscripts / Epic Records Reviewed: Yes     ** Please Note: This note has been constructed using a voice recognition system   **

## 2020-02-25 NOTE — ED PROVIDER NOTES
History  Chief Complaint   Patient presents with    Altered Mental Status     Per family, pt had sudden onset of memory loss, dizziness, and weakness that began approx 2 hours ago  HPI  51-year-old male with history of hypertension, nonischemic cardiomyopathy s/p ICD placement, AV replacement, PAF, CHF, HLD, presents to the emergency department following an episode of transient amnesia  Patient apparently had just gotten off the phone with his sister when he had acute onset of amnesia regarding recent events  Per family, patient forgot that he had spoken to his sister at all  He could not recall other conversations or events and he lost orientation to time, thought it was 2014  Family called the PCP and was given instructions to evaluate for focal deficits via phone  (On evaluation had no facial asymmetry or extremity weakness  He was aware of who family was)  The entire lasted approximately an hour and half and on the way to the emergency department he began remembering the events of the morning and remembering earlier in the day he could not remember  On presentation, patient is back at baseline and denies any complaints  He denies having had similar symptoms in the past and did not noted any modifying factors for his symptoms  On review of systems, he states that he was feeling lightheaded during the episode and felt slightly unwell earlier in the morning, but without specific symptoms  He denies any recent medication changes or difficulty sleeping  He has not had any recent head injury  Prior to Admission Medications   Prescriptions Last Dose Informant Patient Reported? Taking?    ELIQUIS 5 MG 2/25/2020 at Unknown time  Yes Yes   Sig: Take 5 mg by mouth 2 (two) times a day   Omega-3 Fatty Acids (FISH OIL PO) 2/25/2020 at Unknown time  Yes Yes   Sig: Take 2,400 mg by mouth   aspirin 81 MG tablet 2/25/2020 at Unknown time  Yes Yes   Sig: Take 81 mg by mouth 3 (three) times a week carvedilol (COREG) 12 5 mg tablet 2/25/2020 at Unknown time  Yes Yes   Sig: Take 18 75 mg by mouth   cyanocobalamin 100 MCG tablet   Yes No   Sig: Take 2,000 mcg by mouth   furosemide (LASIX) 20 mg tablet 2/25/2020 at Unknown time  Yes Yes   Sig: Take 40 mg by mouth daily    leuprolide (ELIGARD) 45 MG injection   Yes Yes   Sig: Inject 45 mg under the skin   lifitegrast (XIIDRA) 5 % op solution   Yes Yes   Sig: Apply 1 drop to eye   lisinopril (ZESTRIL) 5 mg tablet 2/25/2020 at Unknown time  Yes Yes   Sig: Take 5 mg by mouth   potassium chloride (KLOR-CON) 20 mEq packet 2/25/2020 at Unknown time  Yes Yes   Sig: Take 20 mEq by mouth daily    rosuvastatin (CRESTOR) 20 MG tablet 2/25/2020 at Unknown time  No Yes   Sig: TAKE 1 TABLET DAILY      Facility-Administered Medications: None       History reviewed  No pertinent past medical history  Past Surgical History:   Procedure Laterality Date    AORTIC VALVE REPLACEMENT      CARDIAC DEFIBRILLATOR PLACEMENT      pulse generator    CATARACT EXTRACTION, BILATERAL Bilateral     PROSTATECTOMY         Family History   Problem Relation Age of Onset    Emphysema Mother     Substance Abuse Other      I have reviewed and agree with the history as documented  E-Cigarette/Vaping     E-Cigarette/Vaping Substances     Social History     Tobacco Use    Smoking status: Never Smoker    Smokeless tobacco: Never Used   Substance Use Topics    Alcohol use: Yes     Comment: social    Drug use: No       Review of Systems   Constitutional: Negative for chills and fever  Eyes: Negative for visual disturbance  Respiratory: Negative for shortness of breath  Cardiovascular: Negative for chest pain  Gastrointestinal: Negative for abdominal pain, nausea and vomiting  Skin: Negative for wound  Allergic/Immunologic: Negative for immunocompromised state  Neurological: Positive for light-headedness and headaches  Psychiatric/Behavioral: Positive for confusion   Negative for sleep disturbance  The patient is not nervous/anxious  All other systems reviewed and are negative  Physical Exam  Physical Exam   Constitutional: He is oriented to person, place, and time  He appears well-nourished  No distress  HENT:   Head: Normocephalic and atraumatic  Eyes: EOM are normal    Neck: Normal range of motion  Neck supple  Cardiovascular: Normal rate and regular rhythm  Pulmonary/Chest: Effort normal and breath sounds normal  No respiratory distress  Abdominal: Soft  He exhibits no distension  There is no tenderness  Musculoskeletal: Normal range of motion  Neurological: He is alert and oriented to person, place, and time  He has normal strength  Coordination normal  GCS eye subscore is 4  GCS verbal subscore is 5  GCS motor subscore is 6  Skin: Skin is warm and dry  He is not diaphoretic  Psychiatric: He has a normal mood and affect  His behavior is normal    Nursing note and vitals reviewed        Vital Signs  ED Triage Vitals   Temperature Pulse Respirations Blood Pressure SpO2   02/25/20 1324 02/25/20 1324 02/25/20 1324 02/25/20 1324 02/25/20 1324   97 9 °F (36 6 °C) 78 17 129/59 95 %      Temp Source Heart Rate Source Patient Position - Orthostatic VS BP Location FiO2 (%)   02/25/20 1324 02/25/20 1324 02/25/20 1324 02/25/20 1324 --   Oral Monitor Sitting Left arm       Pain Score       02/25/20 2030       No Pain           Vitals:    02/25/20 2156 02/26/20 0000 02/26/20 0100 02/26/20 0200   BP: 132/68 136/68 157/71 120/58   Pulse: 62 66 61 60   Patient Position - Orthostatic VS: Lying Lying  Lying         Visual Acuity  Visual Acuity      Most Recent Value   L Pupil Size (mm)  3   R Pupil Size (mm)  3          ED Medications  Medications   aspirin chewable tablet 81 mg (has no administration in time range)   cyanocobalamin (VITAMIN B-12) tablet 2,000 mcg (has no administration in time range)   apixaban (ELIQUIS) tablet 5 mg (5 mg Oral Given 2/25/20 1951)   furosemide (LASIX) tablet 40 mg (has no administration in time range)   lisinopril (ZESTRIL) tablet 5 mg (has no administration in time range)   atorvastatin (LIPITOR) tablet 40 mg (40 mg Oral Not Given 2/25/20 1953)   acetaminophen (TYLENOL) tablet 650 mg (has no administration in time range)   ondansetron (ZOFRAN) injection 4 mg (has no administration in time range)   carvedilol (COREG) tablet 18 75 mg (18 75 mg Oral Given 2/25/20 2027)   iohexol (OMNIPAQUE) 350 MG/ML injection (MULTI-DOSE) 73 mL (73 mL Intravenous Given 2/25/20 2105)       Diagnostic Studies  Results Reviewed     Procedure Component Value Units Date/Time    Urine Microscopic [[id number]]  (Abnormal) Collected:  02/25/20 1512    Lab Status:  Final result Specimen:  Urine, Clean Catch Updated:  02/25/20 1535     RBC, UA 0-1 /hpf      WBC, UA None Seen /hpf      Epithelial Cells None Seen /hpf      Bacteria, UA None Seen /hpf      MUCUS THREADS Occasional    Troponin I [[id number]]  (Normal) Collected:  02/25/20 1504    Lab Status:  Final result Specimen:  Blood from Arm, Right Updated:  02/25/20 1533     Troponin I 0 02 ng/mL     Protime-INR [[id number]]  (Normal) Collected:  02/25/20 1504    Lab Status:  Final result Specimen:  Blood from Arm, Right Updated:  02/25/20 1526     Protime 14 1 seconds      INR 1 09    APTT [[id number]]  (Normal) Collected:  02/25/20 1504    Lab Status:  Final result Specimen:  Blood from Arm, Right Updated:  02/25/20 1526     PTT 30 seconds     Basic metabolic panel [[id number]] Collected:  02/25/20 1504    Lab Status:  Final result Specimen:  Blood from Arm, Right Updated:  02/25/20 1524     Sodium 139 mmol/L      Potassium 4 4 mmol/L      Chloride 102 mmol/L      CO2 28 mmol/L      ANION GAP 9 mmol/L      BUN 21 mg/dL      Creatinine 0 90 mg/dL      Glucose 124 mg/dL      Calcium 9 0 mg/dL      eGFR 78 ml/min/1 73sq m     Narrative:       [place] guidelines for Chronic Kidney Disease (CKD):     Stage 1 with normal or high GFR (GFR > 90 mL/min/1 73 square meters)    Stage 2 Mild CKD (GFR = 60-89 mL/min/1 73 square meters)    Stage 3A Moderate CKD (GFR = 45-59 mL/min/1 73 square meters)    Stage 3B Moderate CKD (GFR = 30-44 mL/min/1 73 square meters)    Stage 4 Severe CKD (GFR = 15-29 mL/min/1 73 square meters)    Stage 5 End Stage CKD (GFR <15 mL/min/1 73 square meters)  Note: GFR calculation is accurate only with a steady state creatinine    UA w Reflex to Microscopic w Reflex to Culture [[id number]]  (Abnormal) Collected:  02/25/20 1512    Lab Status:  Final result Specimen:  Urine, Clean Catch Updated:  02/25/20 1521     Color, UA Yellow     Clarity, UA Clear     Specific Gravity, UA 1 015     pH, UA 6 5     Leukocytes, UA Negative     Nitrite, UA Negative     Protein, UA Negative mg/dl      Glucose, UA Negative mg/dl      Ketones, UA Negative mg/dl      Urobilinogen, UA 1 0 E U /dl      Bilirubin, UA Negative     Blood, UA Trace-Intact    CBC and Platelet [[id number]]  (Abnormal) Collected:  02/25/20 1504    Lab Status:  Final result Specimen:  Blood from Arm, Right Updated:  02/25/20 1509     WBC 7 50 Thousand/uL      RBC 3 82 Million/uL      Hemoglobin 11 9 g/dL      Hematocrit 35 2 %      MCV 92 fL      MCH 31 2 pg      MCHC 33 8 g/dL      RDW 12 9 %      Platelets 965 Thousands/uL      MPV 10 3 fL                  CT head without contrast   Final Result by Shavon Vazquez MD (02/25 1507)      No acute intracranial abnormality                    Workstation performed: [id number]         MRI Inpatient Order    (Results Pending)   CTA head w wo contrast    (Results Pending)              Procedures  Procedures         ED Course             Stroke Assessment     Row Name 02/25/20 1538             NIH Stroke Scale    Interval  Baseline      Level of Consciousness (1a )  0      LOC Questions (1b )  0      LOC Commands (1c )  0      Best Gaze (2 )  0      Visual (3 )  0      Facial Palsy (4 )  0      Motor Arm, Left (5a )  0      Motor Arm, Right (5b )  0      Motor Leg, Left (6a )  0      Motor Leg, Right (6b )  0      Limb Ataxia (7 )  0      Sensory (8 )  0      Best Language (9 )  0      Dysarthria (10 )  0      Extinction and Inattention (11 ) (Formerly Neglect)  0      Total  0          First Filed Value   TPA Decision  Patient not a TPA candidate  Patient is not a candidate options  Symptoms resolved/clearly non disabling  MDM      Disposition  Final diagnoses:   Transient amnesia     Time reflects when diagnosis was documented in both MDM as applicable and the Disposition within this note     Time User Action Codes Description Comment    2/25/2020  3:37 PM Clemente Del Cid Add [R41 3] Transient amnesia     2/25/2020  6:45 PM Georgina BELL Add [R73 01] Impaired fasting glucose       ED Disposition     ED Disposition Condition Date/Time Comment    Admit Stable Tue Feb 25, 2020  3:37 PM Case was discussed with Dr Dayna Mark and the patient's admission status was agreed to be Admission Status: observation status to the service of Dr Dayna Mark           Follow-up Information    None         Current Discharge Medication List      CONTINUE these medications which have NOT CHANGED    Details   aspirin 81 MG tablet Take 81 mg by mouth 3 (three) times a week       carvedilol (COREG) 12 5 mg tablet Take 18 75 mg by mouth      ELIQUIS 5 MG Take 5 mg by mouth 2 (two) times a day  Refills: 1      furosemide (LASIX) 20 mg tablet Take 40 mg by mouth daily       leuprolide (ELIGARD) 45 MG injection Inject 45 mg under the skin      lifitegrast (XIIDRA) 5 % op solution Apply 1 drop to eye      lisinopril (ZESTRIL) 5 mg tablet Take 5 mg by mouth      Omega-3 Fatty Acids (FISH OIL PO) Take 2,400 mg by mouth      potassium chloride (KLOR-CON) 20 mEq packet Take 20 mEq by mouth daily       rosuvastatin (CRESTOR) 20 MG tablet TAKE 1 TABLET DAILY  Qty: 90 tablet, Refills: 3    Associated Diagnoses: Mixed hyperlipidemia      cyanocobalamin 100 MCG tablet Take 2,000 mcg by mouth           No discharge procedures on file      PDMP Review     None          ED Provider  Electronically Signed by           Tobias Feliz MD  02/26/20 8047

## 2020-02-25 NOTE — ASSESSMENT & PLAN NOTE
Wt Readings from Last 3 Encounters:   02/25/20 95 3 kg (210 lb)   02/04/20 95 7 kg (211 lb)   10/01/19 98 9 kg (218 lb)     No evidence of acute exacerbation  Continue oral Lasix  Daily weight I/O low-sodium diet

## 2020-02-25 NOTE — ASSESSMENT & PLAN NOTE
· Patient had a lasting event of an hour and half of being unable to recall events improper days  · Patient appears back at baseline  · Initiate stroke pathway  · Patient had recent ICD placement will need to call [employer] regarding his pacer serial number [id number] and model number [id number]  · Will order MRI brain if not obtainable due to ICD will discontinue  · Further will get an echocardiogram  · Telemetry monitor  · CT head unremarkable  · Will continue aspirin statin  · Patient had a recent lipid panel completed 1/31/2020 total cholesterol 138 triglycerides 83 HDL 54 LDL 67  · Neurology consult pending

## 2020-02-26 ENCOUNTER — APPOINTMENT (OUTPATIENT)
Dept: NEUROLOGY | Facility: HOSPITAL | Age: 85
DRG: 071 | End: 2020-02-26
Payer: MEDICARE

## 2020-02-26 ENCOUNTER — APPOINTMENT (OUTPATIENT)
Dept: ULTRASOUND IMAGING | Facility: HOSPITAL | Age: 85
DRG: 071 | End: 2020-02-26
Payer: MEDICARE

## 2020-02-26 ENCOUNTER — APPOINTMENT (OUTPATIENT)
Dept: NON INVASIVE DIAGNOSTICS | Facility: HOSPITAL | Age: 85
DRG: 071 | End: 2020-02-26
Payer: MEDICARE

## 2020-02-26 LAB
ANION GAP SERPL CALCULATED.3IONS-SCNC: 10 MMOL/L (ref 4–13)
ATRIAL RATE: 163 BPM
BUN SERPL-MCNC: 18 MG/DL (ref 5–25)
CALCIUM SERPL-MCNC: 8.8 MG/DL (ref 8.3–10.1)
CHLORIDE SERPL-SCNC: 103 MMOL/L (ref 100–108)
CHOLEST SERPL-MCNC: 137 MG/DL (ref 50–200)
CO2 SERPL-SCNC: 26 MMOL/L (ref 21–32)
CREAT SERPL-MCNC: 0.81 MG/DL (ref 0.6–1.3)
ERYTHROCYTE [DISTWIDTH] IN BLOOD BY AUTOMATED COUNT: 12.8 % (ref 11.6–15.1)
EST. AVERAGE GLUCOSE BLD GHB EST-MCNC: 126 MG/DL
GFR SERPL CREATININE-BSD FRML MDRD: 81 ML/MIN/1.73SQ M
GLUCOSE P FAST SERPL-MCNC: 127 MG/DL (ref 65–99)
GLUCOSE SERPL-MCNC: 127 MG/DL (ref 65–140)
HBA1C MFR BLD: 6 %
HCT VFR BLD AUTO: 33.6 % (ref 36.5–49.3)
HDLC SERPL-MCNC: 51 MG/DL
HGB BLD-MCNC: 11.5 G/DL (ref 12–17)
LDLC SERPL CALC-MCNC: 72 MG/DL (ref 0–100)
MCH RBC QN AUTO: 31.4 PG (ref 26.8–34.3)
MCHC RBC AUTO-ENTMCNC: 34.2 G/DL (ref 31.4–37.4)
MCV RBC AUTO: 92 FL (ref 82–98)
P AXIS: 67 DEGREES
PLATELET # BLD AUTO: 128 THOUSANDS/UL (ref 149–390)
PMV BLD AUTO: 10.3 FL (ref 8.9–12.7)
POTASSIUM SERPL-SCNC: 3.9 MMOL/L (ref 3.5–5.3)
PR INTERVAL: 156 MS
QRS AXIS: 171 DEGREES
QRSD INTERVAL: 158 MS
QT INTERVAL: 500 MS
QTC INTERVAL: 507 MS
RBC # BLD AUTO: 3.66 MILLION/UL (ref 3.88–5.62)
SODIUM SERPL-SCNC: 139 MMOL/L (ref 136–145)
T WAVE AXIS: 23 DEGREES
TRIGL SERPL-MCNC: 68 MG/DL
VENTRICULAR RATE: 62 BPM
WBC # BLD AUTO: 7.24 THOUSAND/UL (ref 4.31–10.16)

## 2020-02-26 PROCEDURE — 99232 SBSQ HOSP IP/OBS MODERATE 35: CPT | Performed by: NURSE PRACTITIONER

## 2020-02-26 PROCEDURE — 83036 HEMOGLOBIN GLYCOSYLATED A1C: CPT | Performed by: NURSE PRACTITIONER

## 2020-02-26 PROCEDURE — 93306 TTE W/DOPPLER COMPLETE: CPT

## 2020-02-26 PROCEDURE — 99223 1ST HOSP IP/OBS HIGH 75: CPT | Performed by: PSYCHIATRY & NEUROLOGY

## 2020-02-26 PROCEDURE — 97161 PT EVAL LOW COMPLEX 20 MIN: CPT

## 2020-02-26 PROCEDURE — 80048 BASIC METABOLIC PNL TOTAL CA: CPT | Performed by: NURSE PRACTITIONER

## 2020-02-26 PROCEDURE — 85027 COMPLETE CBC AUTOMATED: CPT | Performed by: NURSE PRACTITIONER

## 2020-02-26 PROCEDURE — 97165 OT EVAL LOW COMPLEX 30 MIN: CPT

## 2020-02-26 PROCEDURE — 93306 TTE W/DOPPLER COMPLETE: CPT | Performed by: INTERNAL MEDICINE

## 2020-02-26 PROCEDURE — 80061 LIPID PANEL: CPT | Performed by: NURSE PRACTITIONER

## 2020-02-26 PROCEDURE — 93010 ELECTROCARDIOGRAM REPORT: CPT | Performed by: INTERNAL MEDICINE

## 2020-02-26 PROCEDURE — 95819 EEG AWAKE AND ASLEEP: CPT | Performed by: PSYCHIATRY & NEUROLOGY

## 2020-02-26 PROCEDURE — 95816 EEG AWAKE AND DROWSY: CPT

## 2020-02-26 PROCEDURE — 93005 ELECTROCARDIOGRAM TRACING: CPT

## 2020-02-26 RX ADMIN — CARVEDILOL 18.75 MG: 12.5 TABLET, FILM COATED ORAL at 09:59

## 2020-02-26 RX ADMIN — ASPIRIN 81 MG 81 MG: 81 TABLET ORAL at 17:35

## 2020-02-26 RX ADMIN — APIXABAN 5 MG: 5 TABLET, FILM COATED ORAL at 10:00

## 2020-02-26 RX ADMIN — APIXABAN 5 MG: 5 TABLET, FILM COATED ORAL at 17:41

## 2020-02-26 RX ADMIN — FUROSEMIDE 40 MG: 40 TABLET ORAL at 10:00

## 2020-02-26 RX ADMIN — CARVEDILOL 18.75 MG: 12.5 TABLET, FILM COATED ORAL at 17:40

## 2020-02-26 RX ADMIN — CYANOCOBALAMIN TAB 500 MCG 2000 MCG: 500 TAB at 09:58

## 2020-02-26 RX ADMIN — ATORVASTATIN CALCIUM 40 MG: 40 TABLET, FILM COATED ORAL at 17:42

## 2020-02-26 RX ADMIN — LISINOPRIL 5 MG: 5 TABLET ORAL at 10:00

## 2020-02-26 NOTE — OCCUPATIONAL THERAPY NOTE
Occupational Therapy Evaluation        Patient Name: Tiffanie Bailey  FNAAE'V Date: 2/26/2020 02/26/20 0820   Note Type   Note type Eval only   Restrictions/Precautions   Weight Bearing Precautions Per Order No   Braces or Orthoses Other (Comment)  (history of elastic knee/back support worn prn)   Other Precautions Telemetry; Fall Risk;Hard of hearing  (hearing aids)   Pain Assessment   Pain Assessment No/denies pain   Pain Score No Pain   Home Living   Type of Home House   Home Layout Multi-level; Able to live on main level with bedroom/bathroom;Stairs to enter with rails  (bi level- basemant 1 KHALIDA then 6 + 6 KHALIDA)   Bathroom Shower/Tub Tub/shower unit  (on second floor)   Bathroom Toilet Raised   Bathroom Equipment Shower chair;Grab bars in [address]   Additional Comments ambulatory with [facility]   Prior Function   Level of [place] Independent with ADLs and functional mobility   Lives With [employer] Help From Family   ADL Assistance Independent   IADLs Needs assistance   Falls in the last 6 months 0   Vocational Retired   Comments patient drives   Lifestyle   Autonomy Patient reporting independent with ADLs/ IADLs, lives with spouse and family in a bi level house, 1 KHALIDA followed by 6 + 6 steps between levels  Patient uses shower on 2nd floor  Patient ambulates with SPC, and drives     Reciprocal Relationships Supportive family   Service to Others Retired [employer] ([address] [facility]) [address] 6  Modified independent   [address] 6  [address] 6  [address] 6  Modified independent   [address] [facility] 6  Modified independent   [address]  6  Modified independent   [address] 6  Modified independent   Bed Mobility   Additional Comments patient received OOB to ini 22 chair  Transfers   Sit to Stand 6  Modified independent   Additional items Armrests   Stand to Sit 6  Modified independent   Additional items Armrests   Functional Mobility   Functional Mobility 6  Modified independent   Additional items SPC   Balance   Static Sitting Good   Dynamic Sitting Good   Static Standing Good   Dynamic Standing Good   Activity Tolerance   Activity Tolerance Patient tolerated treatment well   Nurse Made Aware TOÑO Eric confirmed pt appropriate for therapy eval; post session pt OOB in recliner chair w/ all needs within reach   RUE Assessment   RUE Assessment WFL   LUE Assessment   LUE Assessment WFL   Hand Function   Gross Motor Coordination Functional   Fine Motor Coordination Functional   Sensation   Light Touch No apparent deficits  (BUEs)   Vision-Basic Assessment   Current Vision Wears glasses all the time   Patient Visual Report Other (Comment)  (No significant changes reported)   Cognition   Overall Cognitive Status WFL   Arousal/Participation Alert; Responsive; Cooperative   Attention Within functional limits   Orientation Level Oriented X4   Memory Within functional limits   Following Commands Follows all commands and directions without difficulty   Assessment   Assessment Patient is a 80 y o  male seen for OT evaluation s/p admit to [facility] on 2/25/2020 w/Transient amnesia  Commorbidities affecting patient's functional performance at time of assessment include: chronic systolic heart failure, HTN, CAD, paroxysmal a-fib, H/O aortic valve replacement w/ tissue graft, H/O impaired fasting glucose, and H/O iron deficiency anemia  Orders placed for OT evaluation and treatment  Performed at least two patient identifiers during session including name and wristband  Prior to [place] reporting independent with ADLs/ IADLs, lives with spouse and family in a bi level house, 1 KHALIDA followed by 6 + 6 steps between levels   Patient uses shower on 2nd floor  Patient ambulates with SPC, and drives  Upon evaluation, patient requires modified independent assist for UB ADLs, modified independent assist for LB ADLs, transfers and functional ambulation in room and bathroom with modified independent assist, with the use of SPC  Presents with functional use of BUEs, with intact prehension, coordination and symmetrical muscle strength  Patient appears to be functioning at baseline  No further acute OT needs identified at this time to warrant continuation of services  D/C OT services  From OT standpoint, recommendation at time of d/c would be Home with family support       Goals   Patient Goals to return home   Recommendation   OT Discharge Recommendation Home with family support   Barthel Index   Feeding 10   Bathing 5   Grooming Score 5   Dressing Score 10   Bladder Score 10   Bowels Score 10   Toilet Use Score 10   Transfers (Bed/Chair) Score 15   Mobility (Level Surface) Score 15   Stairs Score 10   Barthel Index Score 100   Modified LoÃ[place]za Scale   Modified LoÃ[place]za Scale 0

## 2020-02-26 NOTE — PLAN OF CARE
Problem: Potential for Falls  Goal: Patient will remain free of falls  Description  INTERVENTIONS:  - Assess patient frequently for physical needs  -  Identify cognitive and physical deficits and behaviors that affect risk of falls  -  [place] fall precautions as indicated by assessment   - Educate patient/family on patient safety including physical limitations  - Instruct patient to call for assistance with activity based on assessment  - Modify environment to reduce risk of injury  - Consider OT/PT consult to assist with strengthening/mobility  Outcome: Progressing     Problem: Neurological Deficit  Goal: Neurological status is stable or improving  Description  Interventions:  - Monitor and assess patient's level of consciousness, motor function, sensory function, and level of assistance needed for ADLs  - Monitor and report changes from baseline  Collaborate with interdisciplinary team to initiate plan and implement interventions as ordered  - Provide and maintain a safe environment  - Consider seizure precautions  - Consider fall precautions  - Consider aspiration precautions  - Consider bleeding precautions  Outcome: Progressing     Problem: Activity Intolerance/Impaired Mobility  Goal: Mobility/activity is maintained at optimum level for patient  Description  Interventions:  - Assess and monitor patient  barriers to mobility and need for assistive/adaptive devices  - Assess patient's emotional response to limitations  - Collaborate with interdisciplinary team and initiate plans and interventions as ordered  - Encourage independent activity per ability   - Maintain proper body alignment  - Perform active/passive rom as tolerated/ordered    - Plan activities to conserve energy   - Turn patient as appropriate  Outcome: Progressing     Problem: Communication Impairment  Goal: Ability to express needs and understand communication  Description  Assess patient's communication skills and ability to understand information  Patient will demonstrate use of effective communication techniques, alternative methods of communication and understanding even if not able to speak  - Encourage communication and provide alternate methods of communication as needed  - Collaborate with case management/ for discharge needs  - Include patient/family/caregiver in decisions related to communication  Outcome: Progressing     Problem: Potential for Aspiration  Goal: Non-ventilated patient's risk of aspiration is minimized  Description  Assess and monitor vital signs, respiratory status, and labs (WBC)  Monitor for signs of aspiration (tachypnea, cough, rales, wheezing, cyanosis, fever)  - Assess and monitor patient's ability to swallow  - Place patient up in chair to eat if possible  - HOB up at 90 degrees to eat if unable to get patient up into chair   - Supervise patient during oral intake  - Instruct patient/ family to take small bites  - Instruct patient/ family to take small single sips when taking liquids  - Follow patient-specific strategies generated by speech pathologist   Outcome: Progressing  Goal: Ventilated patient's risk of aspiration is minimized  Description  Assess and monitor vital signs, respiratory status, airway cuff pressure, and labs (WBC)  Monitor for signs of aspiration (tachypnea, cough, rales, wheezing, cyanosis, fever)  - Elevate head of bed 30 degrees if patient has tube feeding   - Monitor tube feeding  Outcome: Progressing     Problem: Nutrition  Goal: Nutrition/Hydration status is improving  Description  Monitor and assess patient's nutrition/hydration status for malnutrition (ex- brittle hair, bruises, dry skin, pale skin and conjunctiva, muscle wasting, smooth red tongue, and disorientation)  Collaborate with interdisciplinary team and initiate plan and interventions as ordered  Monitor patient's weight and dietary intake as ordered or per policy   Utilize nutrition screening tool and intervene per policy  Determine patient's food preferences and provide high-protein, high-caloric foods as appropriate  - Assist patient with eating   - Allow adequate time for meals   - Encourage patient to take dietary supplement as ordered  - Collaborate with clinical nutritionist   - Include patient/family/caregiver in decisions related to nutrition    Outcome: Progressing

## 2020-02-26 NOTE — SOCIAL WORK
CM met with pt at bedside  OBS was already completed  Pt lives with his wife Katlyn Moya in a bilevel house with 12 steps w/railing to reach the main living area when entering through the basement and 6 steps w/railing when entering through the front door  There are 5 KHALIDA and pt is able to navigate steps without difficulty  He is independent with ADL's and uses a cane to ambulate  He was in [address] following a hip replacement and followed that with OP/PT through [facility]  He had an aortic valve replacement in 2003 and used [facility] of [address] at that time  Denies substance abuse or mental health issues  He smokes an occasional cigar, but chewed tobacco x 40 years  He quit in 2003  He uses [facility] in [place] for immed need medications  He has an Advanced Directive and his POA is his granddaughter Sosa Montero  He is retired, but still drives  He also works prn as a   CM discussed d/c needs including [facility] services, but pt feels he has adequate support in the home  His daughter Nkechi Kurtz and granddaughter Kate Good live next door and provide any needed assistance  CM will continue to follow through hospitalization  CM reviewed discharge planning process including the following: identifying help at home, patient preference for discharge planning needs, pharmacy preference, and availability of treatment team to discuss questions or concerns patient and/or family may have regarding understanding medications and recognizing signs and symptoms once discharged  CM also encouraged patient to follow up with all recommended appointments after discharge  Patient advised of importance for patient and family to participate in managing patients medical well being  CM name and role reviewed  Discharge Checklist reviewed and CM will continue to monitor for progress toward discharge goals in nursing and provider rounds

## 2020-02-26 NOTE — ASSESSMENT & PLAN NOTE
Wt Readings from Last 3 Encounters:   02/26/20 96 2 kg (212 lb 1 3 oz)   02/04/20 95 7 kg (211 lb)   10/01/19 98 9 kg (218 lb)     No evidence of acute exacerbation  Continue oral Lasix  Daily weight I/O low-sodium diet

## 2020-02-26 NOTE — PROGRESS NOTES
Progress Note - Maren Encarnacion 1934, 80 y o  male MRN: [id number]    Unit/Bed#: -01 Encounter: [id number]    Primary Care Provider: Jimy Hoffman MD   Date and time admitted to hospital: 2/25/2020  1:25 PM        * Transient amnesia  Assessment & Plan  · Patient had a lasting event of an hour and half of being unable to recall events improper days  · Patient appears back at baseline  · Continue stroke pathway  · Patient had recent ICD placement will need to call [employer] regarding his pacer serial number [id number] and model number [id number] call placed company today device is not MRI compatible  · MRI has been canceled neurology is aware  · Echocardiogram is pending  · Continue telemetry monitoring  · CT head unremarkable  · CTA of the head and neck pending  · Continue ASA and statin  · Patient had a recent lipid panel completed 1/31/2020 total cholesterol 138 triglycerides 83 HDL 54 LDL 67  · Discussion with Neurology ongoing stroke workup is warranted given patient's history and presentation  · Further workup to include carotid study and CT head    PAF (paroxysmal atrial fibrillation) (HCC)  Assessment & Plan  · Rate controlled  · Continue Eliquis  · Continue Coreg    Coronary artery disease involving native coronary artery of native heart without angina pectoris  Assessment & Plan  · Continue ASA and statin    Essential hypertension  Assessment & Plan  · Blood pressure noted  · Continue lisinopril and coreg  · Routine vital monitor    Chronic systolic heart failure (HCC)  Assessment & Plan  Wt Readings from Last 3 Encounters:   02/26/20 96 2 kg (212 lb 1 3 oz)   02/04/20 95 7 kg (211 lb)   10/01/19 98 9 kg (218 lb)     No evidence of acute exacerbation  Continue oral Lasix  Daily weight I/O low-sodium diet              VTE Pharmacologic Prophylaxis:   Pharmacologic: Apixaban (Eliquis)  Mechanical VTE Prophylaxis in Place: Yes    Patient Centered Rounds: I have performed bedside rounds with nursing staff today  Discussions with Specialists or Other Care Team Provider: neurology    Education and Discussions with Family / Patient:  Patient    Time Spent for Care: 30 minutes  More than 50% of total time spent on counseling and coordination of care as described above  Current Length of Stay: 0 day(s)    Current Patient Status: Inpatient   Certification Statement: The patient will continue to require additional inpatient hospital stay due to Ongoing treatment and workup for transient amnesia with concern for stroke    Discharge Plan:  Pending workup discharge likely in next 24 hours    Code Status: Level 1 - Full Code      Subjective:   Patient reports no other episodes of memory loss or feeling like he does not aware is at  Patient denies any weakness denies any lightheadedness headache shortness of breath  Patient has no other concerns at this point  Objective:     Vitals:   Temp (24hrs), Av °F (36 7 °C), Min:97 8 °F (36 6 °C), Max:98 3 °F (36 8 °C)    Temp:  [97 8 °F (36 6 °C)-98 3 °F (36 8 °C)] 98 3 °F (36 8 °C)  HR:  [57-68] 61  Resp:  [13-18] 13  BP: (111-157)/(55-71) 127/63  SpO2:  [96 %-99 %] 99 %  Body mass index is 33 22 kg/m²  Input and Output Summary (last 24 hours): Intake/Output Summary (Last 24 hours) at 2020 1549  Last data filed at 2020 0900  Gross per 24 hour   Intake 480 ml   Output    Net 480 ml       Physical Exam:     Physical Exam   Constitutional: He is oriented to person, place, and time  He appears well-developed and well-nourished  HENT:   Head: Normocephalic and atraumatic  Eyes: Conjunctivae and EOM are normal    Neck: Normal range of motion  Cardiovascular: Normal rate, regular rhythm and normal heart sounds  Pulmonary/Chest: Effort normal and breath sounds normal    Abdominal: Soft  Bowel sounds are normal    Musculoskeletal: Normal range of motion  Neurological: He is alert and oriented to person, place, and time     Skin: Skin is warm and dry  Psychiatric: He has a normal mood and affect  His behavior is normal          Additional Data:     Labs:    Results from last 7 days   Lab Units 02/26/20  0518   WBC Thousand/uL 7 24   HEMOGLOBIN g/dL 11 5*   HEMATOCRIT % 33 6*   PLATELETS Thousands/uL 128*     Results from last 7 days   Lab Units 02/26/20  0518   SODIUM mmol/L 139   POTASSIUM mmol/L 3 9   CHLORIDE mmol/L 103   CO2 mmol/L 26   BUN mg/dL 18   CREATININE mg/dL 0 81   ANION GAP mmol/L 10   CALCIUM mg/dL 8 8   GLUCOSE RANDOM mg/dL 127     Results from last 7 days   Lab Units 02/25/20  1504   INR  1 09         Results from last 7 days   Lab Units 02/26/20  0518   HEMOGLOBIN A1C % 6 0*               * I Have Reviewed All Lab Data Listed Above  * Additional Pertinent Lab Tests Reviewed: All Labs Within Last 24 Hours Reviewed    Imaging:    Imaging Reports Reviewed Today Include:  As mentioned above      Recent Cultures (last 7 days):           Last 24 Hours Medication List:     Current Facility-Administered Medications:  acetaminophen 650 mg Oral Q6H PRN WILLIAM Bennett   apixaban 5 mg Oral BID WILLIAM Bennett   aspirin 81 mg Oral Once per day on Mon Wed Fri WILLIAM Bennett   atorvastatin 40 mg Oral Daily With AquaBling, WILLIAM   carvedilol 18 75 mg Oral BID With Valentina Hoang MD   cyanocobalamin 2,000 mcg Oral Daily WILLIAM Bennett   furosemide 40 mg Oral Daily WILLIAM Bennett   lisinopril 5 mg Oral Daily WILLIAM Bennett   ondansetron 4 mg Intravenous Q6H PRN WILLIAM Bennett        Today, Patient Was Seen By: WILLIAM Bennett    ** Please Note: Dictation voice to text software may have been used in the creation of this document   **

## 2020-02-26 NOTE — PHYSICAL THERAPY NOTE
Physical Therapy Evaluation     Patient's Name: Ani Mistry    Admitting Diagnosis  Impaired fasting glucose [R73 01]  Transient amnesia [R41 3]  AMS (altered mental status) [R41 82]    Problem List  Patient Active Problem List   Diagnosis    Chronic systolic heart failure ([facility] [address] )    Essential hypertension    H/O aortic valve replacement with tissue graft    Mixed hyperlipidemia    Impaired fasting glucose    Iron deficiency anemia    Coronary artery disease involving native coronary artery of native heart without angina pectoris    PAF (paroxysmal atrial fibrillation) ([facility] [address] )    Transient amnesia       Past Medical History  History reviewed  No pertinent past medical history  Past Surgical History  Past Surgical History:   Procedure Laterality Date    AORTIC VALVE REPLACEMENT      CARDIAC DEFIBRILLATOR PLACEMENT      pulse generator    CATARACT EXTRACTION, BILATERAL Bilateral     PROSTATECTOMY            02/26/20 0836   Note Type   Note type Eval only   Pain Assessment   Pain Assessment No/denies pain   Pain Score No Pain   Home Living   Type of Home House   Home Layout Multi-level; Able to live on main level with bedroom/bathroom; Performs ADLs on one level;Stairs to enter with rails  (1 KHALIDA then 6+6 inside; bi-level basement)   Bathroom Shower/Tub Tub/shower unit   Bathroom Toilet Raised   Bathroom Equipment Shower chair;Grab bars in shower   [address] [facility] for household and community distances)   Prior Function   Level of [place] Independent with ADLs and functional mobility   Lives With [employer] Help From Family   ADL Assistance Independent   IADLs Needs assistance   Falls in the last 6 months 0   Vocational Retired   Comments pt drives   Restrictions/Precautions   [employer] Bearing Precautions Per Order No   Braces or Orthoses Other (Comment)  (history of elastic knee/back support worn prn)   Other Precautions Telemetry;Hard of hearing General   Family/Caregiver Present No   Cognition   Overall Cognitive Status WFL   Arousal/Participation Cooperative   Attention Within functional limits   Orientation Level Oriented X4   Memory Within functional limits   Following Commands Follows all commands and directions without difficulty   Comments pt agreeable to therapy eval; pt ID verified via first/last name and    RUE Assessment   RUE Assessment WFL  (based on functional assessment)   LUE Assessment   LUE Assessment WFL  (based on functional assessment)   RLE Assessment   RLE Assessment WFL   Strength RLE   RLE Overall Strength 4/5  (grossly assessed with functional mobility: at least 4/5)   LLE Assessment   LLE Assessment WFL   Strength LLE   LLE Overall Strength 4/5  (grossly assessed with functional mobility: at least 4/5)   Coordination   Movements are Fluid and Coordinated 1   Sensation WFL   Light Touch   RLE Light Touch Grossly intact   LLE Light Touch Grossly intact   Bed Mobility   Additional Comments pt received sitting in recliner chair upon arrival   Transfers   Sit to Stand 6  Modified independent   Additional items Armrests   Stand to Sit 6  Modified independent   Additional items Armrests   Ambulation/Elevation   Gait pattern Decreased foot clearance; Excessively slow   Gait Assistance 6  Modified independent   Assistive Device [facility]   Distance 36' + 120'   Stair Management Assistance 6  Modified independent   Stair Management Technique One rail L;With cane   Number of Stairs 6   Balance   Static Sitting Good   Dynamic Sitting Good   Static Standing Good   Dynamic Standing Good   Ambulatory Good   Endurance Deficit   Endurance Deficit No   Activity Tolerance   Activity Tolerance Patient tolerated treatment well   Medical Staff Made Aware EASTON Schultz   Nurse Made Aware TOÑO Gong confirmed pt appropriate for therapy eval; post session pt OOB in recliner chair w/ all needs w/in reach   Assessment   Prognosis Good   Problem List Impaired hearing   Assessment Pt is 80 y o  male seen for PT evaluation s/p admit to [facility] on 2/25/2020 w/ Transient amnesia  PT consulted to assess pt's functional mobility and d/c needs  Order placed for PT eval and tx, w/ up and OOB as tolerated order  Comorbidities affecting pt's physical performance at time of assessment include: chronic systolic heart failure, HTN, CAD, paroxysmal a-fib, H/O aortic valve replacement w/ tissue graft, H/O impaired fasting glucose, and H/O iron deficiency anemia  PTA, pt was independent w/ all functional mobility and activity w/ SPC, ambulating community distances and elevations, has 1 KHALIDA, living w/ spouse in bi-level level home and retired  Personal factors affecting pt at time of IE include: multi-level house, ambulating w/ assistive device and hearing impairments  Please find objective findings from PT assessment regarding body systems outlined above  The following objective measures performed on IE: Barthel Index: 100/100 and Modified [place]: 1 (no significant disability)  Pt's clinical presentation is currently stable  From PT/mobility standpoint, pt appears to be functioning close to or at mobility baseline, therefore no further immediate skilled PT needs are warranted at this time  Pt currently performing all phases of functional mobility at modified independent level with use of SPC safely w/ good carryover of such  Recommend pt return to previous living environment once medically cleared w/ family support  Will sign off, D/C PT  Please reconsult if further immediate skilled PT needs are warranted  Barriers to Discharge None   Goals   Patient Goals to return to PLOF   PT Treatment Day 0   Plan   Treatment/Interventions OT; Spoke to nursing   Recommendation   Recommendation Home with family support;D/C PT   PT - OK to Discharge Yes  (when medically cleared)   Modified [place] Scale   Modified [place] Scale 1   Barthel Index   Feeding 10   Bathing 5   Grooming Score 5   Dressing Score 10   Bladder Score 10   Bowels Score 10   Toilet Use Score 10   Transfers (Bed/Chair) Score 15   Mobility (Level Surface) Score 15   Stairs Score 10   Barthel Index Score 100         Avi Stokes, SPT

## 2020-02-26 NOTE — ASSESSMENT & PLAN NOTE
· Patient had a lasting event of an hour and half of being unable to recall events improper days  · Patient appears back at baseline  · Continue stroke pathway  · Patient had recent ICD placement will need to call [facility] regarding his pacer serial number [id number] and model number [id number] call placed company today device is not MRI compatible  · MRI has been canceled neurology is aware  · Echocardiogram is pending  · Continue telemetry monitoring  · CT head unremarkable  · CTA of the head and neck pending  · Continue ASA and statin  · Patient had a recent lipid panel completed 1/31/2020 total cholesterol 138 triglycerides 83 HDL 54 LDL 67  · Discussion with Neurology ongoing stroke workup is warranted given patient's history and presentation  · Further workup to include carotid study and CT head

## 2020-02-26 NOTE — CONSULTS
Consultation - Neurology   Mango Kruger 80 y o  male MRN: [id number]  Unit/Bed#: -01 Encounter: [id number]    Assessment/Plan   Assessment:  51-year-old male with hypertension, nonischemic cardiomyopathy status post ICD, PAF, dyslipidemia, prediabetes, presents with transient episode of amnesia  Transient amnesia:  -brief and isolated to a phone conversation with his sister and writing down something in his date book  -denies lateralized symptoms  -patient taking Eliquis 5 mg b i d  at time of event  Aspirin recently stopped by his cardiologist   -episode too brief to represent TGA  ? 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  -CTA head with no flow consequential stenosis or LVO  -CTA neck not done  -LDL 67, A1c 6 1  Plan:  1  ICD is not MRI compatible  We will instead order repeat CT brain for tomorrow and carotid ultrasound  2  Check EEG  3  Continue stroke pathway  4  2D echocardiogram with bubble study pending  Discussed with Dr Amanda Silvestre  History of Present Illness     Reason for Consult / Principal Problem:  Transient amnesia  Hx and PE limited by:  N/A  HPI: Mango Kruger is a 80 y o   male with hypertension, nonischemic cardiomyopathy status post ICD implantation, AVR, PAF, CHF, dyslipidemia, who presents with a brief episode of transient amnesia  The patient was in his usual state of health yesterday morning when he awoke and had breakfast   His sister called around 21 [phone], and he recalls answering the phone and saying [place] name]"  His wife was nearby and apparently noticed something was wrong, so she took the phone from him    He does not recall the conversation with his sister but does recall hearing his wife speaking to her, but he could not process what she was saying  He did not lose consciousness during this  Later, he discovered he wrote down the fact that his friends father's memorial service is today, but does not remember doing so  His wife called his grand daughter over, and they called the family doctor who advised screening for stroke signs  The patient did not have any lateralized weakness, and denies numbness or paresthesias with the episode  He was back to normal by the time he presented to the emergency department  The amnesia seemed to be isolated to the phone conversation and writing down the Elijah's  He is on Eliquis 5 mg b i d  For his PAF and was taking a baby aspirin several times a week until it was discontinued on 02/13/2020 by his cardiologist     CT brain demonstrated no acute changes but did demonstrate an old left caudate infarction  CTA head was performed and did not demonstrate any LVO or other significant findings  CTA neck was not done  Currently, the patient feels completely back at his baseline  Inpatient consult to Neurology  Consult performed by: Hallie Cordova PA-C  Consult ordered by: WILLIAM Narayanan          Review of Systems   Constitutional: Negative  HENT: Negative  Eyes: Negative  Respiratory: Negative  Cardiovascular: Negative  Gastrointestinal: Negative  Endocrine: Negative  Genitourinary: Negative  Musculoskeletal: Negative  Skin: Negative for rash  Allergic/Immunologic: Negative  Neurological:        As above  Hematological: Negative  Psychiatric/Behavioral: Positive for confusion  Historical Information   History reviewed  No pertinent past medical history    Past Surgical History:   Procedure Laterality Date    AORTIC VALVE REPLACEMENT      CARDIAC DEFIBRILLATOR PLACEMENT      pulse generator    CATARACT EXTRACTION, BILATERAL Bilateral     PROSTATECTOMY Social History   Social History     Substance and Sexual Activity   Alcohol Use Yes    Comment: social     Social History     Substance and Sexual Activity   Drug Use No     E-Cigarette/Vaping     E-Cigarette/Vaping Substances     Social History     Tobacco Use   Smoking Status Never Smoker   Smokeless Tobacco Never Used     Family History:   Family History   Problem Relation Age of Onset    Emphysema Mother     Substance Abuse Other        Review of previous medical records was completed  Meds/Allergies   PTA meds:   Prior to Admission Medications   Prescriptions Last Dose Informant Patient Reported? Taking? ELIQUIS 5 MG 2/25/2020 at Unknown time  Yes Yes   Sig: Take 5 mg by mouth 2 (two) times a day   Omega-3 Fatty Acids (FISH OIL PO) 2/25/2020 at Unknown time  Yes Yes   Sig: Take 2,400 mg by mouth   aspirin 81 MG tablet Not Taking at Unknown time  Yes No   Sig: Take 81 mg by mouth 3 (three) times a week    carvedilol (COREG) 12 5 mg tablet 2/25/2020 at Unknown time  Yes Yes   Sig: Take 18 75 mg by mouth   cyanocobalamin 100 MCG tablet   Yes No   Sig: Take 2,000 mcg by mouth   furosemide (LASIX) 20 mg tablet 2/25/2020 at Unknown time  Yes Yes   Sig: Take 40 mg by mouth daily    leuprolide (ELIGARD) 45 MG injection   Yes Yes   Sig: Inject 45 mg under the skin   lifitegrast (XIIDRA) 5 % op solution   Yes Yes   Sig: Apply 1 drop to eye   lisinopril (ZESTRIL) 5 mg tablet 2/25/2020 at Unknown time  Yes Yes   Sig: Take 5 mg by mouth   potassium chloride (KLOR-CON) 20 mEq packet 2/25/2020 at Unknown time  Yes Yes   Sig: Take 20 mEq by mouth daily    rosuvastatin (CRESTOR) 20 MG tablet 2/25/2020 at Unknown time  No Yes   Sig: TAKE 1 TABLET DAILY      Facility-Administered Medications: None       Allergies   Allergen Reactions    Simvastatin Allergic Rhinitis       Objective   Vitals:Blood pressure 132/60, pulse 68, temperature 98 °F (36 7 °C), temperature source Oral, resp   rate 18, height 5' 7" (1 702 m), weight 96 2 kg (212 lb 1 3 oz), SpO2 98 %  ,Body mass index is 33 22 kg/m²  Intake/Output Summary (Last 24 hours) at 2/26/2020 1211  Last data filed at 2/26/2020 0900  Gross per 24 hour   Intake 480 ml   Output    Net 480 ml       Invasive Devices: Invasive Devices     Peripheral Intravenous Line            Peripheral IV 02/25/20 Right Antecubital less than 1 day                Physical Exam   General:  Patient is well-developed, obese BMI 33 22, and in no acute distress  HEENT:  Head normocephalic  Eyes anicteric  Cardiovascular:  With regular rhythm  No anterior neck bruits auscultated  Lungs:  Clear to auscultation  Abdomen:  Soft, nontender, with bowel sounds present  Extremities:  With no significant edema  Neurologic Exam  Mental Status:  Patient was alert, pleasantly interactive, and appropriately conversational   No obvious symbolic language difficulty or dysarthria, and the patient was fully oriented  He was accurate with simple calculations and could correctly spell the word world backwards  Gait deferred  Cranial Nerves:   II: Visual fields full to confrontation  Pupils equal, round, reactive to light with normal accomodation  Could not visualize optic fundi  III,IV,VI: extraocular movements intact with no nystagmus  V: Sensation in the V1 through V3 distributions intact to pin bilaterally  VII:  Subtle resting right facial asymmetry noted  VIII: Audition intact to finger rub bilaterally  IX/X: Uvula midline  Soft palate elevation symmetric  XII: Tongue midline with no atrophy or fasciculations with appropriate movement  Coordination:  Patient was accurate with finger-to-nose and heel-to-shin maneuvers bilaterally  Motor testing with full symmetrical strength throughout the 4 extremities with no upper or lower extremity drift  Sensory testing grossly intact to pin and light touch throughout       Muscle stretch reflexes:  2 throughout the right upper and lower extremities, 1 throughout the left upper and lower extremities  Toe responses were upgoing on the right and downgoing on the left  Lab Results:   I have personally reviewed pertinent reports  , CBC:   Results from last 7 days   Lab Units 02/26/20  0518 02/25/20  1504   WBC Thousand/uL 7 24 7 50   RBC Million/uL 3 66* 3 82*   HEMOGLOBIN g/dL 11 5* 11 9*   HEMATOCRIT % 33 6* 35 2*   MCV fL 92 92   PLATELETS Thousands/uL 128* 152   , BMP/CMP:   Results from last 7 days   Lab Units 02/26/20  0518 02/25/20  1504   SODIUM mmol/L 139 139   POTASSIUM mmol/L 3 9 4 4   CHLORIDE mmol/L 103 102   CO2 mmol/L 26 28   BUN mg/dL 18 21   CREATININE mg/dL 0 81 0 90   CALCIUM mg/dL 8 8 9 0   EGFR ml/min/1 73sq m 81 78   , HgBA1C:   , TSH:   , Lipid Profile:   Results from last 7 days   Lab Units 02/26/20  0518   HDL mg/dL 51   LDL CALC mg/dL 72   TRIGLYCERIDES mg/dL 68     Imaging Studies: I have personally reviewed pertinent reports  and I have personally reviewed pertinent films in PACS CT brain, CTA head  EKG, Pathology, and Other Studies: I have personally reviewed pertinent reports      VTE Prophylaxis: Eliquis    Code Status: Level 1 - Full Code  Advance Directive and Living Will:      Power of :    POLST:

## 2020-02-26 NOTE — ED NOTES
1  CC reports loss of memory after talking on the phone   2  Is this admission r/t an injury? na  3  Orientation status AAOx4  4  Abnormal labs, assessment, vitals  Blood in urine  5  Medication/labs   6  Last time narcotics were given   7  IV lines, drains, etc #20 RAC  8  Isolation Status  na  9  Skin WNL  10  Ambulation status Cane prior to admission, stand by assist at this time    11 ED RN phone number [phone] [facility] Kimberley, RN  02/26/20 Jayden 1540, RN  02/26/20 4211

## 2020-02-26 NOTE — UTILIZATION REVIEW
Initial Clinical Review  OBSERVATION 2/25/20 @1538 CONVERTED TO INPATIENT 2/26/20 @1548 DUE TO CHANGE IN MENTAL STATUS  02/26/20 1548  Inpatient Admission Once     Transfer Service: General Medicine       Question Answer Comment   Admitting Physician [facility] [address], [facility]    Level of Care Med Surg    Estimated length of stay More than 2 Midnights    Certification I certify that inpatient services are medically necessary for this patient for a duration of greater than two midnights  See H&P and MD Progress Notes for additional information about the patient's course of treatment  02/26/20 1548         ED Arrival Information     Expected Arrival Acuity Means of Arrival Escorted By Service Admission Type    - 2/25/2020 13:04 Emergent Walk-In Family Member Hospitalist Emergency    [address]        Chief Complaint   Patient presents with    Altered Mental Status     Per family, pt had sudden onset of memory loss, dizziness, and weakness that began approx 2 hours ago  Assessment/Plan: 80year old male to the ED from home with complaints of altered mental status, dizziness, weakness which started 2 hours prior to his arrival   Admitted under observation then converted to inpatient  for transient amnesia, afib, hypertension  He could not recall other conversations or events and he lost orientation to time, thought it was 2014  Family called the PCP and was given instructions to evaluate for focal deficits via phone  (On evaluation had no facial asymmetry or extremity weakness  NIHSS=0  Not a TPA candidate as symptoms have resolved     * Transient amnesia  Assessment & Plan  · Patient had a lasting event of an hour and half of being unable to recall events improper days  · Patient appears back at baseline  · Initiate stroke pathway  · Patient had recent ICD placement will need to call [employer] regarding his pacer serial number [id number] and model number [id number]  · Will order MRI brain if not obtainable due to ICD will discontinue  · Further will get an echocardiogram  · Telemetry monitor  · CT head unremarkable  · Will continue aspirin statin  · Patient had a recent lipid panel completed 1/31/2020 total cholesterol 138 triglycerides 83 HDL 54 LDL 67  · Neurology consult pending     PAF (paroxysmal atrial fibrillation) (HCC)  Assessment & Plan  · Rate controlled  · Continue Eliquis  · Continue Coreg     Coronary artery disease involving native coronary artery of native heart without angina pectoris  Assessment & Plan  · Continue ASA and statin     Essential hypertension  Assessment & Plan  · Blood pressure noted  · Continue lisinopril and coreg  · Routine vital monitor     Chronic systolic heart failure (HCC)  Assessment & Plan      Wt Readings from Last 3 Encounters:   02/25/20 95 3 kg (210 lb)   02/04/20 95 7 kg (211 lb)   10/01/19 98 9 kg (218 lb)      No evidence of acute exacerbation  Continue oral Lasix  Daily weight I/O low-sodium diet        ED Triage Vitals   Temperature Pulse Respirations Blood Pressure SpO2   02/25/20 1324 02/25/20 1324 02/25/20 1324 02/25/20 1324 02/25/20 1324   97 9 °F (36 6 °C) 78 17 129/59 95 %      Temp Source Heart Rate Source Patient Position - Orthostatic VS BP Location FiO2 (%)   02/25/20 1324 02/25/20 1324 02/25/20 1324 02/25/20 1324 --   Oral Monitor Sitting Left arm       Pain Score       02/25/20 2030       No Pain        Wt Readings from Last 1 Encounters:   02/26/20 96 2 kg (212 lb 1 3 oz)     Additional Vital Signs:   Date/Time Temp Pulse Resp BP MAP (mmHg) SpO2 O2 Device Patient Position - Orthostatic VS   02/26/20 07:07:48 97 8 °F (36 6 °C) 64 18 131/68 89 99 %     02/26/20 0700 97 8 °F (36 6 °C) 64 18 131/68 89 99 % None (Room air) Sitting   02/26/20 0500 97 9 °F (36 6 °C) 61 18 111/55  96 % None (Room air) Sitting   02/26/20 0300 97 9 °F (36 6 °C) 66 18 115/57  98 % None (Room air) Lying   02/26/20 0200 98 °F (36 7 °C) 60 16 120/58  96 % None (Room air) Lying   02/26/20 0100 98 3 °F (36 8 °C) 61 16 157/71  97 % None (Room air)    02/26/20 0000  66 18 136/68  99 %  Lying   02/25/20 2156  62 18 132/68  99 %  Lying   02/25/20 2030 97 8 °F (36 6 °C) 60 18 137/60  96 % None (Room air) Lying   02/25/20 2027  60  137/60       02/25/20 1730  61 17 151/66 95 96 %     02/25/20 1600  60 18 142/64 92 96 % None (Room air) Lying   02/25/20 1530  60 18 157/66 98      02/25/20 1515  60 15 146/67         Pertinent Labs/Diagnostic Test Results:   2/25 CT head:  No acute intracranial abnormality  2/25 EKG;   Atrial-sensed ventricular-paced rhythm  Results from last 7 days   Lab Units 02/26/20  0518 02/25/20  1504   WBC Thousand/uL 7 24 7 50   HEMOGLOBIN g/dL 11 5* 11 9*   HEMATOCRIT % 33 6* 35 2*   PLATELETS Thousands/uL 128* 152     Results from last 7 days   Lab Units 02/26/20  0518 02/25/20  1504   SODIUM mmol/L 139 139   POTASSIUM mmol/L 3 9 4 4   CHLORIDE mmol/L 103 102   CO2 mmol/L 26 28   ANION GAP mmol/L 10 9   BUN mg/dL 18 21   CREATININE mg/dL 0 81 0 90   EGFR ml/min/1 73sq m 81 78   CALCIUM mg/dL 8 8 9 0             Results from last 7 days   Lab Units 02/26/20  0518 02/25/20  1504   GLUCOSE RANDOM mg/dL 127 124     Results from last 7 days   Lab Units 02/25/20  1504   TROPONIN I ng/mL 0 02         Results from last 7 days   Lab Units 02/25/20  1504   PROTIME seconds 14 1   INR  1 09   PTT seconds 30     Results from last 7 days   Lab Units 02/25/20  1512   CLARITY UA  Clear   COLOR UA  Yellow   SPEC GRAV UA  1 015   PH UA  6 5   GLUCOSE UA mg/dl Negative   KETONES UA mg/dl Negative   BLOOD UA  Trace-Intact*   PROTEIN UA mg/dl Negative   NITRITE UA  Negative   BILIRUBIN UA  Negative   UROBILINOGEN UA E U /dl 1 0   LEUKOCYTES UA  Negative   WBC UA /hpf None Seen   RBC UA /hpf 0-1*   BACTERIA UA /hpf None Seen   EPITHELIAL CELLS WET PREP /hpf None Seen   MUCUS THREADS  Occasional*     ED Treatment:   Medication Administration from 02/25/2020 1304 to 02/26/2020 0122       Date/Time Order Dose Route Action     02/25/2020 1951 apixaban (ELIQUIS) tablet 5 mg 5 mg Oral Given     02/25/2020 2027 carvedilol (COREG) tablet 18 75 mg 18 75 mg Oral Given        Admitting Diagnosis: Impaired fasting glucose [R73 01]  Transient amnesia [R41 3]  AMS (altered mental status) [R41 82]  Age/Sex: 80 y o  male  Admission Orders:  NIHss every 24 hours  Neuro checks: Every 1 hour x 4 hours, then every 2 hours x 8 hours, then every 4 hours x 72 hours  Tele  CTA head  MRI  Scheduled Medications:    Medications:  apixaban 5 mg Oral BID   aspirin 81 mg Oral Once per day on Mon Wed Fri   atorvastatin 40 mg Oral Daily With Dinner   carvedilol 18 75 mg Oral BID With Meals   cyanocobalamin 2,000 mcg Oral Daily   furosemide 40 mg Oral Daily   lisinopril 5 mg Oral Daily     Continuous IV Infusions:     PRN Meds:    acetaminophen 650 mg Oral Q6H PRN   ondansetron 4 mg Intravenous Q6H PRN       IP CONSULT TO NEUROLOGY  IP CONSULT TO CASE MANAGEMENT  IP CONSULT TO NUTRITION SERVICES    Network Utilization Review Department  [email or web address]  ATTENTION: Please call with any questions or concerns to [phone] and carefully listen to the prompts so that you are directed to the right person  All voicemails are confidential   Asha Ford all requests for admission clinical reviews, approved or denied determinations and any other requests to dedicated fax number below belonging to the campus where the patient is receiving treatment   List of dedicated fax numbers for the Facilities:  FACILITY NAME UR FAX NUMBER   ADMISSION DENIALS (Administrative/Medical Necessity) [phone]   [address] (Maternity/NICU/Pediatrics) [phone]   Idalmis Boswell [phone]     [address] [phone]   Umair Monson [phone]   Tessie Crowe [phone]     Delmy Hair [phone]   [facility]  [phone] [address] [facility]  [phone]   [address] [place] [address] [phone]

## 2020-02-27 ENCOUNTER — APPOINTMENT (INPATIENT)
Dept: CT IMAGING | Facility: HOSPITAL | Age: 85
DRG: 071 | End: 2020-02-27
Payer: MEDICARE

## 2020-02-27 ENCOUNTER — APPOINTMENT (INPATIENT)
Dept: ULTRASOUND IMAGING | Facility: HOSPITAL | Age: 85
DRG: 071 | End: 2020-02-27
Payer: MEDICARE

## 2020-02-27 PROCEDURE — 99232 SBSQ HOSP IP/OBS MODERATE 35: CPT | Performed by: PSYCHIATRY & NEUROLOGY

## 2020-02-27 PROCEDURE — 99232 SBSQ HOSP IP/OBS MODERATE 35: CPT | Performed by: PHYSICIAN ASSISTANT

## 2020-02-27 PROCEDURE — 70450 CT HEAD/BRAIN W/O DYE: CPT

## 2020-02-27 PROCEDURE — 93880 EXTRACRANIAL BILAT STUDY: CPT

## 2020-02-27 RX ADMIN — CYANOCOBALAMIN TAB 500 MCG 2000 MCG: 500 TAB at 09:36

## 2020-02-27 RX ADMIN — APIXABAN 5 MG: 5 TABLET, FILM COATED ORAL at 09:37

## 2020-02-27 RX ADMIN — CARVEDILOL 18.75 MG: 12.5 TABLET, FILM COATED ORAL at 09:36

## 2020-02-27 RX ADMIN — ATORVASTATIN CALCIUM 40 MG: 40 TABLET, FILM COATED ORAL at 16:43

## 2020-02-27 RX ADMIN — APIXABAN 5 MG: 5 TABLET, FILM COATED ORAL at 18:11

## 2020-02-27 RX ADMIN — FUROSEMIDE 40 MG: 40 TABLET ORAL at 09:37

## 2020-02-27 RX ADMIN — LISINOPRIL 5 MG: 5 TABLET ORAL at 09:37

## 2020-02-27 RX ADMIN — CARVEDILOL 18.75 MG: 12.5 TABLET, FILM COATED ORAL at 16:43

## 2020-02-27 NOTE — ASSESSMENT & PLAN NOTE
· Patient had a lasting event of an hour and half of being unable to recall events improper days  · Patient appears back at baseline  · Continue stroke pathway  · Patient had recent ICD placement will need to call [employer] regarding his pacer serial number [id number] and model number [id number] call placed company today device is not MRI compatible  · CTA no acute findings  · MRI has been canceled neurology is aware  · Repeat CT ordered in place of MRI  Unremarkable  · Echocardiogram with 50% EF  · Continue telemetry monitoring  · Continue ASA and statin  · Patient had a recent lipid panel completed 1/31/2020 total cholesterol 138 triglycerides 83 HDL 54 LDL 67  · Discussion with Neurology ongoing stroke workup is warranted given patient's history and presentation  · We are pending carotid studies    · PT/OT - patient highly functional - patient refusing VNA

## 2020-02-27 NOTE — PROGRESS NOTES
Progress Note - [facility] 1934, 80 y o  male MRN: [id number]  Unit/Bed#: -01 Encounter: [id number]  Primary Care Provider: Christine Alfaro MD   Date and time admitted to hospital: 2/25/2020  1:25 PM    * Transient amnesia  Assessment & Plan  · Patient had a lasting event of an hour and half of being unable to recall events improper days  · Patient appears back at baseline  · Continue stroke pathway  · Patient had recent ICD placement will need to call [employer] regarding his pacer serial number [id number] and model number [id number] call placed company today device is not MRI compatible  · CTA no acute findings  · MRI has been canceled neurology is aware  · Repeat CT ordered in place of MRI  Unremarkable  · Echocardiogram with 50% EF  · Continue telemetry monitoring  · Continue ASA and statin  · Patient had a recent lipid panel completed 1/31/2020 total cholesterol 138 triglycerides 83 HDL 54 LDL 67  · Discussion with Neurology ongoing stroke workup is warranted given patient's history and presentation  · We are pending carotid studies  · PT/OT - patient highly functional - patient refusing VNA    PAF (paroxysmal atrial fibrillation) (HCC)  Assessment & Plan  · Rate controlled  · Continue Eliquis  · Continue Coreg    Coronary artery disease involving native coronary artery of native heart without angina pectoris  Assessment & Plan  · Continue ASA and statin    Essential hypertension  Assessment & Plan  · Blood pressure noted  · Continue lisinopril and coreg  · Routine vital monitor    Chronic systolic heart failure (HCC)  Assessment & Plan  Wt Readings from Last 3 Encounters:   02/26/20 96 2 kg (212 lb 1 3 oz)   02/04/20 95 7 kg (211 lb)   10/01/19 98 9 kg (218 lb)     No evidence of acute exacerbation  Continue oral Lasix  Daily weight I/O low-sodium diet          VTE Pharmacologic Prophylaxis:   Pharmacologic: Apixaban (Eliquis)  Mechanical VTE Prophylaxis in Place: Yes    Patient Centered Rounds:  I have performed bedside rounds with nursing staff today  Discussions with Specialists or Other Care Team Provider:  Discussed with Neurology, case management, nursing    Education and Discussions with Family / Patient:  Discussed with patient    Time Spent for Care: 30 minutes  More than 50% of total time spent on counseling and coordination of care as described above  Current Length of Stay: 1 day(s)    Current Patient Status: Inpatient   Certification Statement: The patient will continue to require additional inpatient hospital stay due to Continue telemetry monitoring, pending vascular studies    Discharge Plan:  Likely 24 hours    Code Status: Level 1 - Full Code      Subjective:   Patient feels well  Denies further forgetfulness    Objective:     Vitals:   Temp (24hrs), Av 1 °F (36 7 °C), Min:97 9 °F (36 6 °C), Max:98 4 °F (36 9 °C)    Temp:  [97 9 °F (36 6 °C)-98 4 °F (36 9 °C)] 97 9 °F (36 6 °C)  HR:  [60-67] 63  Resp:  [16-18] 18  BP: (107-123)/(53-96) 123/96  SpO2:  [96 %-97 %] 96 %  Body mass index is 33 22 kg/m²  Input and Output Summary (last 24 hours): Intake/Output Summary (Last 24 hours) at 2020 1904  Last data filed at 2020 0901  Gross per 24 hour   Intake 360 ml   Output 400 ml   Net -40 ml       Physical Exam:     Physical Exam   Constitutional: He appears well-developed and well-nourished  No distress  Patient does demonstrate another episode of forgetfulness  HENT:   Head: Normocephalic and atraumatic  Mouth/Throat: No oropharyngeal exudate  Eyes: Right eye exhibits no discharge  Left eye exhibits no discharge  No scleral icterus  Neck: No JVD present  No tracheal deviation present  No thyromegaly present  Cardiovascular: Regular rhythm  Exam reveals no gallop and no friction rub  No murmur heard  Pulmonary/Chest: Effort normal and breath sounds normal  No respiratory distress  He has no wheezes  He has no rales  He exhibits no tenderness  Abdominal: Soft  Bowel sounds are normal  He exhibits no distension  There is no tenderness  There is no rebound  Musculoskeletal: He exhibits no edema, tenderness or deformity  Skin: Skin is warm and dry  He is not diaphoretic  No erythema  No pallor  Psychiatric: He has a normal mood and affect  His behavior is normal        Additional Data:     Labs:    Results from last 7 days   Lab Units 02/26/20  0518   WBC Thousand/uL 7 24   HEMOGLOBIN g/dL 11 5*   HEMATOCRIT % 33 6*   PLATELETS Thousands/uL 128*     Results from last 7 days   Lab Units 02/26/20  0518   SODIUM mmol/L 139   POTASSIUM mmol/L 3 9   CHLORIDE mmol/L 103   CO2 mmol/L 26   BUN mg/dL 18   CREATININE mg/dL 0 81   ANION GAP mmol/L 10   CALCIUM mg/dL 8 8   GLUCOSE RANDOM mg/dL 127     Results from last 7 days   Lab Units 02/25/20  1504   INR  1 09         Results from last 7 days   Lab Units 02/26/20  0518   HEMOGLOBIN A1C % 6 0*               * I Have Reviewed All Lab Data Listed Above  * Additional Pertinent Lab Tests Reviewed: All Labs Within Last 24 Hours Reviewed      Recent Cultures (last 7 days):           Last 24 Hours Medication List:     Current Facility-Administered Medications:  acetaminophen 650 mg Oral Q6H PRN Ronita Gowers, CRNP   apixaban 5 mg Oral BID Ronita Gowers, CRNP   aspirin 81 mg Oral Once per day on Mon Wed Fri Ronita Gowers, CRNP   atorvastatin 40 mg Oral Daily With [employer], WILLIAM   carvedilol 18 75 mg Oral BID With Meals Linda Belle MD   cyanocobalamin 2,000 mcg Oral Daily Ronita Gowers, CRNP   furosemide 40 mg Oral Daily Ronita Gowers, CRNP   lisinopril 5 mg Oral Daily Ronita Gowers, CRNP   ondansetron 4 mg Intravenous Q6H PRN Ronita Gowers, CRNP        Today, Patient Was Seen By: Last Ceballos PA-C    ** Please Note: Dictation voice to text software may have been used in the creation of this document   **

## 2020-02-27 NOTE — PLAN OF CARE
Problem: Potential for Falls  Goal: Patient will remain free of falls  Description  INTERVENTIONS:  - Assess patient frequently for physical needs  -  Identify cognitive and physical deficits and behaviors that affect risk of falls  -  [place] fall precautions as indicated by assessment   - Educate patient/family on patient safety including physical limitations  - Instruct patient to call for assistance with activity based on assessment  - Modify environment to reduce risk of injury  - Consider OT/PT consult to assist with strengthening/mobility  Outcome: Progressing     Problem: Neurological Deficit  Goal: Neurological status is stable or improving  Description  Interventions:  - Monitor and assess patient's level of consciousness, motor function, sensory function, and level of assistance needed for ADLs  - Monitor and report changes from baseline  Collaborate with interdisciplinary team to initiate plan and implement interventions as ordered  - Provide and maintain a safe environment  - Consider seizure precautions  - Consider fall precautions  - Consider aspiration precautions  - Consider bleeding precautions  Outcome: Progressing     Problem: Activity Intolerance/Impaired Mobility  Goal: Mobility/activity is maintained at optimum level for patient  Description  Interventions:  - Assess and monitor patient  barriers to mobility and need for assistive/adaptive devices  - Assess patient's emotional response to limitations  - Collaborate with interdisciplinary team and initiate plans and interventions as ordered  - Encourage independent activity per ability   - Maintain proper body alignment  - Perform active/passive rom as tolerated/ordered    - Plan activities to conserve energy   - Turn patient as appropriate  Outcome: Progressing     Problem: Communication Impairment  Goal: Ability to express needs and understand communication  Description  Assess patient's communication skills and ability to understand information  Patient will demonstrate use of effective communication techniques, alternative methods of communication and understanding even if not able to speak  - Encourage communication and provide alternate methods of communication as needed  - Collaborate with case management/ for discharge needs  - Include patient/family/caregiver in decisions related to communication  Outcome: Progressing     Problem: Potential for Aspiration  Goal: Non-ventilated patient's risk of aspiration is minimized  Description  Assess and monitor vital signs, respiratory status, and labs (WBC)  Monitor for signs of aspiration (tachypnea, cough, rales, wheezing, cyanosis, fever)  - Assess and monitor patient's ability to swallow  - Place patient up in chair to eat if possible  - HOB up at 90 degrees to eat if unable to get patient up into chair   - Supervise patient during oral intake  - Instruct patient/ family to take small bites  - Instruct patient/ family to take small single sips when taking liquids  - Follow patient-specific strategies generated by speech pathologist   Outcome: Progressing  Goal: Ventilated patient's risk of aspiration is minimized  Description  Assess and monitor vital signs, respiratory status, airway cuff pressure, and labs (WBC)  Monitor for signs of aspiration (tachypnea, cough, rales, wheezing, cyanosis, fever)  - Elevate head of bed 30 degrees if patient has tube feeding   - Monitor tube feeding  Outcome: Progressing     Problem: Nutrition  Goal: Nutrition/Hydration status is improving  Description  Monitor and assess patient's nutrition/hydration status for malnutrition (ex- brittle hair, bruises, dry skin, pale skin and conjunctiva, muscle wasting, smooth red tongue, and disorientation)  Collaborate with interdisciplinary team and initiate plan and interventions as ordered  Monitor patient's weight and dietary intake as ordered or per policy   Utilize nutrition screening tool and intervene per policy  Determine patient's food preferences and provide high-protein, high-caloric foods as appropriate  - Assist patient with eating   - Allow adequate time for meals   - Encourage patient to take dietary supplement as ordered  - Collaborate with interdisciplinary team   - Include patient/family/caregiver in decisions related to nutrition     Outcome: Progressing

## 2020-02-27 NOTE — PROGRESS NOTES
Progress Note - Neurology   Tiffanie Bailey 80 y o  male MRN: [id number]  Unit/Bed#: -01 Encounter: [id number]    Assessment:  63-year-old male with hypertension, nonischemic cardiomyopathy status post ICD, PAF, dyslipidemia, prediabetes, presents with transient episode of amnesia      Transient amnesia:  -brief and isolated to a phone conversation with his sister and writing down something in his date book  -denies lateralized symptoms  -patient taking Eliquis 5 mg b i d  at time of event  Aspirin recently stopped by his cardiologist   -etiology of episode as yet unclear  Question hypoperfusion, tiny temporal lobe TIA/infarct not evident on CT brain? 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  -CTA head with no flow consequential stenosis or LVO  -CTA neck not done  -LDL 67, A1c 6 1   -2D echocardiogram with EF 50%, mildly dilated atria, no MAC  Plan:  1  Continue Eliquis 5 mg b i d  2  Will have patient follow with Neurology as an outpatient  Appointment requested on 02/27/2020  Subjective:   Patient denies any additional episodes of amnesia since admission  He feels well and is not particularly anxious to go home  ROS: 12 point review of systems performed and negative except as indicated above  Vitals: Blood pressure 112/56, pulse 61, temperature 97 9 °F (36 6 °C), temperature source Oral, resp  rate 18, height 5' 7" (1 702 m), weight 96 2 kg (212 lb 1 3 oz), SpO2 97 %  ,Body mass index is 33 22 kg/m²  Physical Exam:   General:  Patient appears to be in no acute distress  HEENT:  Head normocephalic    Eyes anicteric  Neurologic:  Patient is alert and pleasantly interactive  No symbolic language difficulty or dysarthria  Fully oriented  EOMs intact without nystagmus  Resting right facial asymmetry but symmetrical smile  Lab, Imaging and other studies: I have personally reviewed pertinent reports      VTE Prophylaxis: Eliquis

## 2020-02-28 VITALS
BODY MASS INDEX: 33.29 KG/M2 | DIASTOLIC BLOOD PRESSURE: 72 MMHG | OXYGEN SATURATION: 98 % | HEIGHT: 67 IN | TEMPERATURE: 98.3 F | WEIGHT: 212.08 LBS | SYSTOLIC BLOOD PRESSURE: 134 MMHG | RESPIRATION RATE: 18 BRPM | HEART RATE: 74 BPM

## 2020-02-28 PROCEDURE — 99239 HOSP IP/OBS DSCHRG MGMT >30: CPT | Performed by: PHYSICIAN ASSISTANT

## 2020-02-28 RX ADMIN — LISINOPRIL 5 MG: 5 TABLET ORAL at 09:41

## 2020-02-28 RX ADMIN — FUROSEMIDE 40 MG: 40 TABLET ORAL at 09:41

## 2020-02-28 RX ADMIN — CARVEDILOL 18.75 MG: 12.5 TABLET, FILM COATED ORAL at 09:00

## 2020-02-28 RX ADMIN — ASPIRIN 81 MG 81 MG: 81 TABLET ORAL at 09:41

## 2020-02-28 RX ADMIN — CYANOCOBALAMIN TAB 500 MCG 2000 MCG: 500 TAB at 09:41

## 2020-02-28 RX ADMIN — APIXABAN 5 MG: 5 TABLET, FILM COATED ORAL at 09:41

## 2020-02-28 NOTE — DISCHARGE SUMMARY
Discharge- Lul Redding 1934, 80 y o  male MRN: [id number]    Unit/Bed#: -01 Encounter: [id number]  Primary Care Provider: Jaswant Christiansen MD   Date and time admitted to hospital: 2/25/2020  1:25 PM    * Transient amnesia  Assessment & Plan  · Patient had a lasting event of an hour and half of being unable to recall events improper days  · Patient appears back at baseline  · Completed stroke pathway  · Patient had recent ICD placement will need to call [employer] regarding his pacer serial number [id number] and model number [id number] call placed company today device is not MRI compatible  · CTA no acute findings  · MRI has been canceled neurology is aware  · Repeat CT ordered in place of MRI  Unremarkable  · Echocardiogram with 50% EF  · Telemetry showing paced rhythm  · Continue ASA and statin  · Patient had a recent lipid panel completed 1/31/2020 total cholesterol 138 triglycerides 83 HDL 54 LDL 67  · Discussion with Neurology ongoing stroke workup is warranted given patient's history and presentation  · Carotid studies - less than 50% b/l stenosis  · Awake/drowsy 28 minute EEG done  It was abnormal but did not show clear epileptic activity  · PT/OT - patient highly functional - patient refusing VNA  · Can be discharged follow with Neurology outpatient  Will need sleep deprived EEG      PAF (paroxysmal atrial fibrillation) (HCC)  Assessment & Plan  · Rate controlled  · Continue Eliquis  · Continue Coreg    Coronary artery disease involving native coronary artery of native heart without angina pectoris  Assessment & Plan  · Continue ASA and statin    Essential hypertension  Assessment & Plan  · Blood pressure noted  · Continue lisinopril and coreg  · Routine vital monitor    Chronic systolic heart failure (HCC)  Assessment & Plan  Wt Readings from Last 3 Encounters:   02/26/20 96 2 kg (212 lb 1 3 oz)   02/04/20 95 7 kg (211 lb)   10/01/19 98 9 kg (218 lb)     No evidence of acute exacerbation  Continue oral Lasix  Daily weight I/O low-sodium diet            Discharging Physician / Practitioner: Amrita Cerrato PA-C  PCP: Yung José MD  Admission Date:   Admission Orders (From admission, onward)     Ordered        02/26/20 1548  Inpatient Admission  Once         02/25/20 1538  Place in Observation (expected length of stay for this patient is less than two midnights)  Once                   Discharge Date: 02/28/20    Resolved Problems  Date Reviewed: 2/28/2020    None          Consultations During Hospital Stay:  · Neurology  · PT/OT  · Case management    Procedures Performed:   · Awake/drowsy EEG    Significant Findings / Test Results:   · None    Incidental Findings:   · 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  · Abnormal delta wave activity on EEG     Test Results Pending at Discharge (will require follow up): · None     Outpatient Tests Requested:  · Sleep-deprived EEG    Complications:  None    Reason for Admission:  Stroke-like symptom    Hospital Course:     Alonso Ruvalcaba is a 80 y o  male patient past medical history of CAD, essential hypertension, PAF with pacer, CHF who originally presented to the hospital on 2/25/2020 due to transient amnesia  Per patient's wife, patient had episode where he could not remember basic things  As an outpatient, he is going through preoperative clearance for hip replacement  He has cardiac clearance  His pacemaker was recently interrogated in did not showed vents  He was admitted on stroke pathway which was overall negative  He was monitored on telemetry  Stat CTA unremarkable  He could not obtain MRI due to pacer and a repeat CT scan was obtained which did not show any changes  He also underwent an EEG which did show abnormal delta wave activity  Neurology has reviewed this and they recommended sleep-deprived EEG  This has been sent  His symptoms have completely resolved and he excels with PT/OT  He is refusing HHC at this time    He has completed full stroke workup and he can be discharged to home to follow up with Neurology in complete sleep-deprived EEG as an outpatient  No medication changes have been done  Please see above list of diagnoses and related plan for additional information  Condition at Discharge: good     Discharge Day Visit / Exam:     Subjective:  Patient feels well  No further media  No complaints  Vitals: Blood Pressure: 117/57 (02/28/20 1506)  Pulse: 60 (02/28/20 1506)  Temperature: 98 °F (36 7 °C) (02/28/20 1130)  Temp Source: Oral (02/28/20 1100)  Respirations: 17 (02/28/20 1506)  Height: 5' 7" (170 2 cm) (02/25/20 1324)  Weight - Scale: 96 2 kg (212 lb 1 3 oz) (02/26/20 0100)  SpO2: 95 % (02/28/20 1506)  Exam:   Physical Exam   Constitutional: He appears well-developed and well-nourished  No distress  HENT:   Head: Normocephalic and atraumatic  Mouth/Throat: No oropharyngeal exudate  Eyes: Right eye exhibits no discharge  Left eye exhibits no discharge  No scleral icterus  Neck: No JVD present  No tracheal deviation present  No thyromegaly present  Cardiovascular: Regular rhythm  Exam reveals no gallop and no friction rub  No murmur heard  Pulmonary/Chest: Effort normal and breath sounds normal  No respiratory distress  He has no wheezes  He has no rales  He exhibits no tenderness  Abdominal: Soft  Bowel sounds are normal  He exhibits no distension  There is no tenderness  There is no rebound  Musculoskeletal: He exhibits no edema, tenderness or deformity  Skin: Skin is warm and dry  He is not diaphoretic  No erythema  No pallor  Psychiatric: He has a normal mood and affect  His behavior is normal      Discussion with Family:  Discussed with patient and patient's wife    Discharge instructions/Information to patient and family:   See after visit summary for information provided to patient and family        Provisions for Follow-Up Care:  See after visit summary for information related to follow-up care and any pertinent home health orders  Disposition:     Home    For Discharges to [facility] SNF:   · Not Applicable to this Patient - Not Applicable to this Patient    Planned Readmission: None     Discharge Statement:  I spent 45 minutes discharging the patient  This time was spent on the day of discharge  I had direct contact with the patient on the day of discharge  Greater than 50% of the total time was spent examining patient, answering all patient questions, arranging and discussing plan of care with patient as well as directly providing post-discharge instructions  Additional time then spent on discharge activities  Discharge Medications:  See after visit summary for reconciled discharge medications provided to patient and family        ** Please Note: This note has been constructed using a voice recognition system **

## 2020-02-28 NOTE — DISCHARGE INSTR - AVS FIRST PAGE
Guillermo Leach,    It was a pleasure taking care of you! Our neurology associates would love to follow with you and I have sent them a referral  We are also requesting a sleep-deprived EEG  We have not made any medication changes this admission  Please do no hesitate to call with any questions      Take care,    Dick Montero PA-C

## 2020-02-28 NOTE — ASSESSMENT & PLAN NOTE
· Patient had a lasting event of an hour and half of being unable to recall events improper days  · Patient appears back at baseline  · Completed stroke pathway  · Patient had recent ICD placement will need to call [employer] regarding his pacer serial number [id number] and model number [id number] call placed company today device is not MRI compatible  · CTA no acute findings  · MRI has been canceled neurology is aware  · Repeat CT ordered in place of MRI  Unremarkable  · Echocardiogram with 50% EF  · Telemetry showing paced rhythm  · Continue ASA and statin  · Patient had a recent lipid panel completed 1/31/2020 total cholesterol 138 triglycerides 83 HDL 54 LDL 67  · Discussion with Neurology ongoing stroke workup is warranted given patient's history and presentation  · Carotid studies - less than 50% b/l stenosis  · Awake/drowsy 28 minute EEG done  It was abnormal but did not show clear epileptic activity  · PT/OT - patient highly functional - patient refusing VNA  · Can be discharged follow with Neurology outpatient  Will need sleep deprived EEG

## 2020-02-28 NOTE — SOCIAL WORK
CM met with pt and wife at bedside  Pt to be discharged home-no needs  Pt continues to refuse [facility] services  Wife Jamie Clarke will transport home  IMM reviewed and signed by wife per pt request   Copy to pt and copy to MR for scanning

## 2020-02-28 NOTE — PLAN OF CARE
Problem: Potential for Falls  Goal: Patient will remain free of falls  Description  INTERVENTIONS:  - Assess patient frequently for physical needs  -  Identify cognitive and physical deficits and behaviors that affect risk of falls  -  [place] fall precautions as indicated by assessment   - Educate patient/family on patient safety including physical limitations  - Instruct patient to call for assistance with activity based on assessment  - Modify environment to reduce risk of injury  - Consider OT/PT consult to assist with strengthening/mobility  Outcome: Adequate for Discharge     Problem: Neurological Deficit  Goal: Neurological status is stable or improving  Description  Interventions:  - Monitor and assess patient's level of consciousness, motor function, sensory function, and level of assistance needed for ADLs  - Monitor and report changes from baseline  Collaborate with interdisciplinary team to initiate plan and implement interventions as ordered  - Provide and maintain a safe environment  - Consider seizure precautions  - Consider fall precautions  - Consider aspiration precautions  - Consider bleeding precautions  Outcome: Adequate for Discharge     Problem: Activity Intolerance/Impaired Mobility  Goal: Mobility/activity is maintained at optimum level for patient  Description  Interventions:  - Assess and monitor patient  barriers to mobility and need for assistive/adaptive devices  - Assess patient's emotional response to limitations  - Collaborate with interdisciplinary team and initiate plans and interventions as ordered  - Encourage independent activity per ability   - Maintain proper body alignment  - Perform active/passive rom as tolerated/ordered    - Plan activities to conserve energy   - Turn patient as appropriate  Outcome: Adequate for Discharge     Problem: Communication Impairment  Goal: Ability to express needs and understand communication  Description  Assess patient's communication skills and ability to understand information  Patient will demonstrate use of effective communication techniques, alternative methods of communication and understanding even if not able to speak  - Encourage communication and provide alternate methods of communication as needed  - Collaborate with case management/ for discharge needs  - Include patient/family/caregiver in decisions related to communication  Outcome: Adequate for Discharge     Problem: Potential for Aspiration  Goal: Non-ventilated patient's risk of aspiration is minimized  Description  Assess and monitor vital signs, respiratory status, and labs (WBC)  Monitor for signs of aspiration (tachypnea, cough, rales, wheezing, cyanosis, fever)  - Assess and monitor patient's ability to swallow  - Place patient up in chair to eat if possible  - HOB up at 90 degrees to eat if unable to get patient up into chair   - Supervise patient during oral intake  - Instruct patient/ family to take small bites  - Instruct patient/ family to take small single sips when taking liquids  - Follow patient-specific strategies generated by speech pathologist   Outcome: Adequate for Discharge  Goal: Ventilated patient's risk of aspiration is minimized  Description  Assess and monitor vital signs, respiratory status, airway cuff pressure, and labs (WBC)  Monitor for signs of aspiration (tachypnea, cough, rales, wheezing, cyanosis, fever)  - Elevate head of bed 30 degrees if patient has tube feeding   - Monitor tube feeding  Outcome: Adequate for Discharge     Problem: Nutrition  Goal: Nutrition/Hydration status is improving  Description  Monitor and assess patient's nutrition/hydration status for malnutrition (ex- brittle hair, bruises, dry skin, pale skin and conjunctiva, muscle wasting, smooth red tongue, and disorientation)  Collaborate with interdisciplinary team and initiate plan and interventions as ordered    Monitor patient's weight and dietary intake as ordered or per policy  Utilize nutrition screening tool and intervene per policy  Determine patient's food preferences and provide high-protein, high-caloric foods as appropriate  - Assist patient with eating   - Allow adequate time for meals   - Encourage patient to take dietary supplement as ordered  - Collaborate with interdisciplinary team   - Include patient/family/caregiver in decisions related to nutrition     Outcome: Adequate for Discharge

## 2020-03-01 PROCEDURE — 93880 EXTRACRANIAL BILAT STUDY: CPT | Performed by: SURGERY

## 2020-03-02 ENCOUNTER — TRANSITIONAL CARE MANAGEMENT (OUTPATIENT)
Dept: INTERNAL MEDICINE CLINIC | Facility: CLINIC | Age: 85
End: 2020-03-02

## 2020-03-10 ENCOUNTER — OFFICE VISIT (OUTPATIENT)
Dept: INTERNAL MEDICINE CLINIC | Facility: CLINIC | Age: 85
End: 2020-03-10
Payer: MEDICARE

## 2020-03-10 VITALS
HEART RATE: 68 BPM | RESPIRATION RATE: 20 BRPM | HEIGHT: 67 IN | OXYGEN SATURATION: 97 % | BODY MASS INDEX: 32.49 KG/M2 | WEIGHT: 207 LBS | SYSTOLIC BLOOD PRESSURE: 120 MMHG | DIASTOLIC BLOOD PRESSURE: 72 MMHG

## 2020-03-10 DIAGNOSIS — R41.3 TRANSIENT AMNESIA: Primary | ICD-10-CM

## 2020-03-10 PROCEDURE — 99495 TRANSJ CARE MGMT MOD F2F 14D: CPT | Performed by: PHYSICIAN ASSISTANT

## 2020-03-10 NOTE — PATIENT INSTRUCTIONS
Patient will go forward with EEG that is ordered  We will also have brain MRI done  We will make formal referral to neurology as soon as possible  We will schedule follow-up here in 1 month  Sooner as needed

## 2020-03-10 NOTE — PROGRESS NOTES
Assessment/Plan:   Patient Instructions   Patient will go forward with EEG that is ordered  We will also have brain MRI done  We will make formal referral to neurology as soon as possible  We will schedule follow-up here in 1 month  Sooner as needed  Quality Measures:       Return in about 4 weeks (around 4/7/2020) for Next scheduled follow up-Pt preference  Diagnoses and all orders for this visit:    Transient amnesia  -     MRI brain w wo contrast; Future  -     Ambulatory referral to Neurology; Future          Subjective:      Patient ID: Cierra Chau is a 80 y o  male  Hospital follow-up/BARORN    Patient was admitted to [facility] AT [place] from 02/25 through 228/2020 due to a transient amnesia where he had an event of approximately an hour and a half of not being able to recall events, speak or remember basic things  He apparently was starting to speak to his sister on the phone, after saying hello he could not remember any events after, or speak or even recognize a note he had written to himself to remember an upcoming event  Hospital neurologic workup was unremarkable  MRI was wanted however due to a recent insertion of an ICD that is not compatible with MRI he was not able to have this done  He had no other further events during his hospitalization and remained stable  Labs were unremarkable  CTA was unremarkable, CT scans were unremarkable  Carotid ultrasound showed less than 50% stenosis  A baseline awake and drowsy EEG done for 28 minutes was reported as abnormal but did not show clear epileptic activity  Echocardiogram was unremarkable  A complete EEG is ordered for outpatient workup  Patient's chronic medical problems including coronary artery disease, hypertension, paroxysmal atrial fibrillation, chronic systolic heart failure remains stable      TCM Call (since 2/8/2020)     Date and time call was made  3/2/2020  3:56 PM    Hospital care reviewed  Records reviewed    Patient was hospitialized at  [facility]    Date of Admission  02/25/20    Date of discharge  02/28/20    Diagnosis  amnesia    Disposition  Home    Were the patients medications reviewed and updated  No    Current Symptoms  None      TCM Call (since 2/8/2020)     Post hospital issues  None    Should patient be enrolled in anticoag monitoring? No    Scheduled for follow up?   Yes    Did you obtain your prescribed medications  Yes    Do you need help managing your prescriptions or medications  No    Is transportation to your appointment needed  No    Living Arrangements  Spouse or Significiant other    Support System  Spouse    The type of support provided  Emotional; Financial; Physical    Do you have social support  Yes, as much as I need    Are you recieving any outpatient services  No    Are you recieving home care services  No    Are you using any community resources  No    Current waiver services  No    Have you fallen in the last 12 months  No    Interperter language line needed  No    Counseling  Family            ALLERGIES:  Allergies   Allergen Reactions    Simvastatin Allergic Rhinitis       CURRENT MEDICATIONS:    Current Outpatient Medications:     carvedilol (COREG) 12 5 mg tablet, Take 18 75 mg by mouth, Disp: , Rfl:     cyanocobalamin 100 MCG tablet, Take 2,000 mcg by mouth, Disp: , Rfl:     ELIQUIS 5 MG, Take 5 mg by mouth 2 (two) times a day, Disp: , Rfl: 1    furosemide (LASIX) 20 mg tablet, Take 40 mg by mouth daily , Disp: , Rfl:     leuprolide (ELIGARD) 45 MG injection, Inject 45 mg under the skin, Disp: , Rfl:     lifitegrast (XIIDRA) 5 % op solution, Apply 1 drop to eye, Disp: , Rfl:     lisinopril (ZESTRIL) 5 mg tablet, Take 5 mg by mouth, Disp: , Rfl:     Omega-3 Fatty Acids (FISH OIL PO), Take 2,400 mg by mouth, Disp: , Rfl:     potassium chloride (KLOR-CON) 20 mEq packet, Take 20 mEq by mouth daily , Disp: , Rfl:     rosuvastatin (CRESTOR) 20 MG tablet, TAKE 1 TABLET DAILY, Disp: 90 tablet, Rfl: 3    aspirin 81 MG tablet, Take 81 mg by mouth 3 (three) times a week , Disp: , Rfl:     ACTIVE PROBLEM LIST:  Patient Active Problem List   Diagnosis    Chronic systolic heart failure (HCC)    Essential hypertension    H/O aortic valve replacement with tissue graft    Mixed hyperlipidemia    Impaired fasting glucose    Iron deficiency anemia    Coronary artery disease involving native coronary artery of native heart without angina pectoris    PAF (paroxysmal atrial fibrillation) (HCC)    Transient amnesia       PAST MEDICAL HISTORY:  History reviewed  No pertinent past medical history      PAST SURGICAL HISTORY:  Past Surgical History:   Procedure Laterality Date    AORTIC VALVE REPLACEMENT      CARDIAC DEFIBRILLATOR PLACEMENT      pulse generator    CATARACT EXTRACTION, BILATERAL Bilateral     PROSTATECTOMY         FAMILY HISTORY:  Family History   Problem Relation Age of Onset    Emphysema Mother     Substance Abuse Other        SOCIAL HISTORY:  Social History     Socioeconomic History    Marital status: /Civil Union     Spouse name: Not on file    Number of children: Not on file    Years of education: Not on file    Highest education level: Not on file   Occupational History    Occupation: retired    Social Needs    Financial resource strain: Not on file    Food insecurity:     Worry: Not on file     Inability: Not on file   [facility] needs:     Medical: Not on file     Non-medical: Not on file   Tobacco Use    Smoking status: Never Smoker    Smokeless tobacco: Never Used   Substance and Sexual Activity    Alcohol use: Yes     Comment: social    Drug use: No    Sexual activity: Not on file     Comment: denied: history of high risk sexual behavior   Lifestyle    Physical activity:     Days per week: Not on file     Minutes per session: Not on file    Stress: Not on file   Relationships    Social connections:     Talks on phone: Not on file     Gets together: Not on file     Attends Mandaen service: Not on file     Active member of club or organization: Not on file     Attends meetings of clubs or organizations: Not on file     Relationship status: Not on file    Intimate partner violence:     Fear of current or ex partner: Not on file     Emotionally abused: Not on file     Physically abused: Not on file     Forced sexual activity: Not on file   Other Topics Concern    Not on file   Social History Narrative    Not on file       Review of Systems   Constitutional: Negative for activity change, chills, fatigue and fever  HENT: Negative for congestion  Eyes: Negative for discharge  Respiratory: Negative for cough, chest tightness and shortness of breath  Cardiovascular: Negative for chest pain, palpitations and leg swelling  Gastrointestinal: Negative for abdominal pain  Genitourinary: Negative for difficulty urinating  Musculoskeletal: Negative for arthralgias and myalgias  Skin: Negative for rash  Allergic/Immunologic: Negative for immunocompromised state  Neurological: Negative for dizziness, tremors, seizures, syncope, facial asymmetry, speech difficulty, weakness, light-headedness, numbness and headaches  Hematological: Negative for adenopathy  Does not bruise/bleed easily  Psychiatric/Behavioral: Negative for behavioral problems, decreased concentration, dysphoric mood and sleep disturbance  The patient is not nervous/anxious  Objective:  Vitals:    03/10/20 1433   BP: 120/72   BP Location: Left arm   Patient Position: Sitting   Cuff Size: Adult   Pulse: 68   Resp: 20   SpO2: 97%   Weight: 93 9 kg (207 lb)   Height: 5' 7" (1 702 m)     Body mass index is 32 42 kg/m²  Physical Exam   Constitutional: He is oriented to person, place, and time  He appears well-developed and well-nourished  No distress  HENT:   Head: Normocephalic and atraumatic  TMs within normal limits     Eyes: No scleral icterus  Left pupil slightly larger than right  Right pupil oval in shape  React to light and accommodation  Extraocular movements intact  Neck: No JVD present  Carotid bruit is not present  No thyromegaly present  Cardiovascular: Normal rate, regular rhythm and normal heart sounds  Frequent extrasystoles are present  Pulmonary/Chest: Effort normal and breath sounds normal  No respiratory distress  He exhibits no tenderness  Musculoskeletal: He exhibits edema (Trace edema bilaterally)  Lymphadenopathy:     He has no cervical adenopathy  Neurological: He is alert and oriented to person, place, and time  He displays normal reflexes  No cranial nerve deficit or sensory deficit  He exhibits normal muscle tone  Coordination normal    Skin: Skin is warm and dry  No rash noted  Psychiatric: He has a normal mood and affect  His behavior is normal  Judgment and thought content normal    Nursing note and vitals reviewed          RESULTS:    Recent Results (from the past 1008 hour(s))   CBC and differential    Collection Time: 01/31/20 10:06 AM   Result Value Ref Range    WBC 5 98 4 31 - 10 16 Thousand/uL    RBC 3 82 (L) 3 88 - 5 62 Million/uL    Hemoglobin 11 7 (L) 12 0 - 17 0 g/dL    Hematocrit 35 6 (L) 36 5 - 49 3 %    MCV 93 82 - 98 fL    MCH 30 6 26 8 - 34 3 pg    MCHC 32 9 31 4 - 37 4 g/dL    RDW 13 0 11 6 - 15 1 %    MPV 11 1 8 9 - 12 7 fL    Platelets 427 264 - 962 Thousands/uL    nRBC 0 /100 WBCs    Neutrophils Relative 59 43 - 75 %    Immat GRANS % 0 0 - 2 %    Lymphocytes Relative 27 14 - 44 %    Monocytes Relative 10 4 - 12 %    Eosinophils Relative 3 0 - 6 %    Basophils Relative 1 0 - 1 %    Neutrophils Absolute 3 57 1 85 - 7 62 Thousands/µL    Immature Grans Absolute 0 01 0 00 - 0 20 Thousand/uL    Lymphocytes Absolute 1 60 0 60 - 4 47 Thousands/µL    Monocytes Absolute 0 57 0 17 - 1 22 Thousand/µL    Eosinophils Absolute 0 20 0 00 - 0 61 Thousand/µL    Basophils Absolute 0 03 0 00 - 0 10 Thousands/µL   Comprehensive metabolic panel    Collection Time: 01/31/20 10:06 AM   Result Value Ref Range    Sodium 141 136 - 145 mmol/L    Potassium 4 3 3 5 - 5 3 mmol/L    Chloride 109 (H) 100 - 108 mmol/L    CO2 26 21 - 32 mmol/L    ANION GAP 6 4 - 13 mmol/L    BUN 13 5 - 25 mg/dL    Creatinine 0 85 0 60 - 1 30 mg/dL    Glucose 110 65 - 140 mg/dL    Calcium 9 0 8 3 - 10 1 mg/dL    AST 14 5 - 45 U/L    ALT 17 12 - 78 U/L    Alkaline Phosphatase 58 46 - 116 U/L    Total Protein 7 0 6 4 - 8 2 g/dL    Albumin 3 4 (L) 3 5 - 5 0 g/dL    Total Bilirubin 0 51 0 20 - 1 00 mg/dL    eGFR 79 ml/min/1 73sq m   Lipid panel    Collection Time: 01/31/20 10:06 AM   Result Value Ref Range    Cholesterol 138 50 - 200 mg/dL    Triglycerides 83 <=150 mg/dL    HDL, Direct 54 >=40 mg/dL    LDL Calculated 67 0 - 100 mg/dL    Non-HDL-Chol (CHOL-HDL) 84 mg/dl   Hemoglobin A1C    Collection Time: 01/31/20 10:06 AM   Result Value Ref Range    Hemoglobin A1C 6 1 4 2 - 6 3 %     mg/dl   ECG 12 lead    Collection Time: 02/25/20  1:50 PM   Result Value Ref Range    Ventricular Rate 65 BPM    Atrial Rate 65 BPM    CO Interval 122 ms    QRSD Interval 148 ms    QT Interval 508 ms    QTC Interval 528 ms    P Axis 60 degrees    QRS Axis 196 degrees    T Wave Axis 25 degrees   Basic metabolic panel    Collection Time: 02/25/20  3:04 PM   Result Value Ref Range    Sodium 139 136 - 145 mmol/L    Potassium 4 4 3 5 - 5 3 mmol/L    Chloride 102 100 - 108 mmol/L    CO2 28 21 - 32 mmol/L    ANION GAP 9 4 - 13 mmol/L    BUN 21 5 - 25 mg/dL    Creatinine 0 90 0 60 - 1 30 mg/dL    Glucose 124 65 - 140 mg/dL    Calcium 9 0 8 3 - 10 1 mg/dL    eGFR 78 ml/min/1 73sq m   CBC and Platelet    Collection Time: 02/25/20  3:04 PM   Result Value Ref Range    WBC 7 50 4 31 - 10 16 Thousand/uL    RBC 3 82 (L) 3 88 - 5 62 Million/uL    Hemoglobin 11 9 (L) 12 0 - 17 0 g/dL    Hematocrit 35 2 (L) 36 5 - 49 3 %    MCV 92 82 - 98 fL    MCH 31 2 26 8 - 34 3 pg MCHC 33 8 31 4 - 37 4 g/dL    RDW 12 9 11 6 - 15 1 %    Platelets 294 466 - 279 Thousands/uL    MPV 10 3 8 9 - 12 7 fL   Protime-INR    Collection Time: 02/25/20  3:04 PM   Result Value Ref Range    Protime 14 1 11 6 - 14 5 seconds    INR 1 09 0 84 - 1 19   APTT    Collection Time: 02/25/20  3:04 PM   Result Value Ref Range    PTT 30 23 - 37 seconds   Troponin I    Collection Time: 02/25/20  3:04 PM   Result Value Ref Range    Troponin I 0 02 <=0 04 ng/mL   UA w Reflex to Microscopic w Reflex to Culture    Collection Time: 02/25/20  3:12 PM   Result Value Ref Range    Color, UA Yellow     Clarity, UA Clear     Specific [place], UA 1 015 1 003 - 1 030    pH, UA 6 5 4 5, 5 0, 5 5, 6 0, 6 5, 7 0, 7 5, 8 0    Leukocytes, UA Negative Negative    Nitrite, UA Negative Negative    Protein, UA Negative Negative mg/dl    Glucose, UA Negative Negative mg/dl    Ketones, UA Negative Negative mg/dl    Urobilinogen, UA 1 0 0 2, 1 0 E U /dl E U /dl    Bilirubin, UA Negative Negative    Blood, UA Trace-Intact (A) Negative   Urine Microscopic    Collection Time: 02/25/20  3:12 PM   Result Value Ref Range    RBC, UA 0-1 (A) None Seen, 0-5 /hpf    WBC, UA None Seen None Seen, 0-5, 5-55, 5-65 /hpf    Epithelial Cells None Seen None Seen, Occasional /hpf    Bacteria, UA None Seen None Seen, Occasional /hpf    MUCUS THREADS Occasional (A) None Seen   Hemoglobin A1c w/EAG Estimation    Collection Time: 02/26/20  5:18 AM   Result Value Ref Range    Hemoglobin A1C 6 0 (H) Normal 3 8-5 6%; PreDiabetic 5 7-6 4%;  Diabetic >=6 5%; Glycemic control for adults with diabetes <7 0% %     mg/dl   Lipid Panel with Direct LDL reflex    Collection Time: 02/26/20  5:18 AM   Result Value Ref Range    Cholesterol 137 50 - 200 mg/dL    Triglycerides 68 <=150 mg/dL    HDL, Direct 51 >=40 mg/dL    LDL Calculated 72 0 - 100 mg/dL   Basic metabolic panel    Collection Time: 02/26/20  5:18 AM   Result Value Ref Range    Sodium 139 136 - 145 mmol/L Potassium 3 9 3 5 - 5 3 mmol/L    Chloride 103 100 - 108 mmol/L    CO2 26 21 - 32 mmol/L    ANION GAP 10 4 - 13 mmol/L    BUN 18 5 - 25 mg/dL    Creatinine 0 81 0 60 - 1 30 mg/dL    Glucose 127 65 - 140 mg/dL    Glucose, Fasting 127 (H) 65 - 99 mg/dL    Calcium 8 8 8 3 - 10 1 mg/dL    eGFR 81 ml/min/1 73sq m   CBC (With Platelets)    Collection Time: 02/26/20  5:18 AM   Result Value Ref Range    WBC 7 24 4 31 - 10 16 Thousand/uL    RBC 3 66 (L) 3 88 - 5 62 Million/uL    Hemoglobin 11 5 (L) 12 0 - 17 0 g/dL    Hematocrit 33 6 (L) 36 5 - 49 3 %    MCV 92 82 - 98 fL    MCH 31 4 26 8 - 34 3 pg    MCHC 34 2 31 4 - 37 4 g/dL    RDW 12 8 11 6 - 15 1 %    Platelets 931 (L) 111 - 390 Thousands/uL    MPV 10 3 8 9 - 12 7 fL   ECG 12 lead    Collection Time: 02/26/20  2:25 PM   Result Value Ref Range    Ventricular Rate 62 BPM    Atrial Rate 163 BPM    LA Interval 156 ms    QRSD Interval 158 ms    QT Interval 500 ms    QTC Interval 507 ms    P [place] 67 degrees    QRS Axis 171 degrees    T Wave Axis 23 degrees       This note was created with voice recognition software  Phonic, grammatical and spelling errors may be present within the note as a result

## 2020-03-12 ENCOUNTER — TELEPHONE (OUTPATIENT)
Dept: NEUROLOGY | Facility: CLINIC | Age: 85
End: 2020-03-12

## 2020-03-12 ENCOUNTER — TELEPHONE (OUTPATIENT)
Dept: INTERNAL MEDICINE CLINIC | Facility: CLINIC | Age: 85
End: 2020-03-12

## 2020-03-12 NOTE — TELEPHONE ENCOUNTER
----- Message from Hallie Cordova PA-C sent at 2/27/2020 12:01 PM EST -----  Regarding: Hospital followup  Diagnosis/Reason for follow-up:  Transient amnesia  Subspecialty for follow-up:  General  Existing neurologist:  N/A  Tests/Labs/Imaging ordered:  N/A  Attending/AP:  Either  Recommended timing for follow-up:  4-8 weeks     Thanks!

## 2020-03-13 ENCOUNTER — TELEPHONE (OUTPATIENT)
Dept: INTERNAL MEDICINE CLINIC | Facility: CLINIC | Age: 85
End: 2020-03-13

## 2020-03-13 NOTE — TELEPHONE ENCOUNTER
Patient called and said that he has an appt with Dr Lux Lawson 4/14/20 and with us on 4/7/20 he thought you might want him to see Dr Lux Lawson before seeing you? Please advise        Please call patient on home # [phone]

## 2020-03-23 ENCOUNTER — HOSPITAL ENCOUNTER (OUTPATIENT)
Dept: NEUROLOGY | Facility: HOSPITAL | Age: 85
Discharge: HOME | End: 2020-03-23
Attending: STUDENT IN AN ORGANIZED HEALTH CARE EDUCATION/TRAINING PROGRAM
Payer: MEDICARE

## 2020-03-23 DIAGNOSIS — R41.3 TRANSIENT AMNESIA: ICD-10-CM

## 2020-03-23 PROCEDURE — 95812 EEG 41-60 MINUTES: CPT | Performed by: PSYCHIATRY & NEUROLOGY

## 2020-03-23 PROCEDURE — 95819 EEG AWAKE AND ASLEEP: CPT

## 2020-04-08 ENCOUNTER — TELEPHONE (OUTPATIENT)
Dept: NEUROLOGY | Facility: CLINIC | Age: 85
End: 2020-04-08

## 2020-06-10 ENCOUNTER — APPOINTMENT (OUTPATIENT)
Dept: LAB | Facility: CLINIC | Age: 85
End: 2020-06-10
Payer: MEDICARE

## 2020-06-10 DIAGNOSIS — I10 ESSENTIAL HYPERTENSION: ICD-10-CM

## 2020-06-10 DIAGNOSIS — R73.01 IMPAIRED FASTING GLUCOSE: ICD-10-CM

## 2020-06-10 LAB
ALBUMIN SERPL BCP-MCNC: 3.5 G/DL (ref 3.5–5)
ALP SERPL-CCNC: 56 U/L (ref 46–116)
ALT SERPL W P-5'-P-CCNC: 19 U/L (ref 12–78)
ANION GAP SERPL CALCULATED.3IONS-SCNC: 6 MMOL/L (ref 4–13)
AST SERPL W P-5'-P-CCNC: 19 U/L (ref 5–45)
BASOPHILS # BLD AUTO: 0.04 THOUSANDS/ΜL (ref 0–0.1)
BASOPHILS NFR BLD AUTO: 1 % (ref 0–1)
BILIRUB SERPL-MCNC: 0.56 MG/DL (ref 0.2–1)
BUN SERPL-MCNC: 18 MG/DL (ref 5–25)
CALCIUM SERPL-MCNC: 9.3 MG/DL (ref 8.3–10.1)
CHLORIDE SERPL-SCNC: 106 MMOL/L (ref 100–108)
CHOLEST SERPL-MCNC: 137 MG/DL (ref 50–200)
CO2 SERPL-SCNC: 25 MMOL/L (ref 21–32)
CREAT SERPL-MCNC: 0.89 MG/DL (ref 0.6–1.3)
EOSINOPHIL # BLD AUTO: 0.27 THOUSAND/ΜL (ref 0–0.61)
EOSINOPHIL NFR BLD AUTO: 5 % (ref 0–6)
ERYTHROCYTE [DISTWIDTH] IN BLOOD BY AUTOMATED COUNT: 13.2 % (ref 11.6–15.1)
EST. AVERAGE GLUCOSE BLD GHB EST-MCNC: 126 MG/DL
GFR SERPL CREATININE-BSD FRML MDRD: 78 ML/MIN/1.73SQ M
GLUCOSE P FAST SERPL-MCNC: 100 MG/DL (ref 65–99)
HBA1C MFR BLD: 6 %
HCT VFR BLD AUTO: 34.6 % (ref 36.5–49.3)
HDLC SERPL-MCNC: 55 MG/DL
HGB BLD-MCNC: 11.4 G/DL (ref 12–17)
IMM GRANULOCYTES # BLD AUTO: 0.02 THOUSAND/UL (ref 0–0.2)
IMM GRANULOCYTES NFR BLD AUTO: 0 % (ref 0–2)
LDLC SERPL CALC-MCNC: 68 MG/DL (ref 0–100)
LYMPHOCYTES # BLD AUTO: 1.51 THOUSANDS/ΜL (ref 0.6–4.47)
LYMPHOCYTES NFR BLD AUTO: 26 % (ref 14–44)
MCH RBC QN AUTO: 31 PG (ref 26.8–34.3)
MCHC RBC AUTO-ENTMCNC: 32.9 G/DL (ref 31.4–37.4)
MCV RBC AUTO: 94 FL (ref 82–98)
MONOCYTES # BLD AUTO: 0.55 THOUSAND/ΜL (ref 0.17–1.22)
MONOCYTES NFR BLD AUTO: 9 % (ref 4–12)
NEUTROPHILS # BLD AUTO: 3.53 THOUSANDS/ΜL (ref 1.85–7.62)
NEUTS SEG NFR BLD AUTO: 59 % (ref 43–75)
NONHDLC SERPL-MCNC: 82 MG/DL
NRBC BLD AUTO-RTO: 0 /100 WBCS
PLATELET # BLD AUTO: 133 THOUSANDS/UL (ref 149–390)
PMV BLD AUTO: 10.9 FL (ref 8.9–12.7)
POTASSIUM SERPL-SCNC: 4.1 MMOL/L (ref 3.5–5.3)
PROT SERPL-MCNC: 7.1 G/DL (ref 6.4–8.2)
RBC # BLD AUTO: 3.68 MILLION/UL (ref 3.88–5.62)
SODIUM SERPL-SCNC: 137 MMOL/L (ref 136–145)
TRIGL SERPL-MCNC: 70 MG/DL
WBC # BLD AUTO: 5.92 THOUSAND/UL (ref 4.31–10.16)

## 2020-06-10 PROCEDURE — 80061 LIPID PANEL: CPT

## 2020-06-10 PROCEDURE — 85025 COMPLETE CBC W/AUTO DIFF WBC: CPT

## 2020-06-10 PROCEDURE — 83036 HEMOGLOBIN GLYCOSYLATED A1C: CPT

## 2020-06-10 PROCEDURE — 36415 COLL VENOUS BLD VENIPUNCTURE: CPT

## 2020-06-10 PROCEDURE — 80053 COMPREHEN METABOLIC PANEL: CPT

## 2020-06-17 ENCOUNTER — OFFICE VISIT (OUTPATIENT)
Dept: NEUROLOGY | Facility: CLINIC | Age: 85
End: 2020-06-17
Payer: MEDICARE

## 2020-06-17 VITALS
HEART RATE: 68 BPM | SYSTOLIC BLOOD PRESSURE: 114 MMHG | DIASTOLIC BLOOD PRESSURE: 58 MMHG | RESPIRATION RATE: 14 BRPM | HEIGHT: 67 IN | OXYGEN SATURATION: 96 % | TEMPERATURE: 97.9 F | WEIGHT: 204.8 LBS | BODY MASS INDEX: 32.15 KG/M2

## 2020-06-17 DIAGNOSIS — R41.3 TRANSIENT AMNESIA: ICD-10-CM

## 2020-06-17 PROCEDURE — 3074F SYST BP LT 130 MM HG: CPT | Performed by: PSYCHIATRY & NEUROLOGY

## 2020-06-17 PROCEDURE — 4040F PNEUMOC VAC/ADMIN/RCVD: CPT | Performed by: PSYCHIATRY & NEUROLOGY

## 2020-06-17 PROCEDURE — 1036F TOBACCO NON-USER: CPT | Performed by: PSYCHIATRY & NEUROLOGY

## 2020-06-17 PROCEDURE — 3078F DIAST BP <80 MM HG: CPT | Performed by: PSYCHIATRY & NEUROLOGY

## 2020-06-17 PROCEDURE — 99215 OFFICE O/P EST HI 40 MIN: CPT | Performed by: PSYCHIATRY & NEUROLOGY

## 2020-06-17 PROCEDURE — 1160F RVW MEDS BY RX/DR IN RCRD: CPT | Performed by: PSYCHIATRY & NEUROLOGY

## 2020-06-17 PROCEDURE — 3008F BODY MASS INDEX DOCD: CPT | Performed by: PSYCHIATRY & NEUROLOGY

## 2020-06-18 ENCOUNTER — OFFICE VISIT (OUTPATIENT)
Dept: INTERNAL MEDICINE CLINIC | Facility: CLINIC | Age: 85
End: 2020-06-18
Payer: MEDICARE

## 2020-06-18 VITALS
HEIGHT: 67 IN | OXYGEN SATURATION: 95 % | BODY MASS INDEX: 32.02 KG/M2 | SYSTOLIC BLOOD PRESSURE: 116 MMHG | WEIGHT: 204 LBS | HEART RATE: 78 BPM | DIASTOLIC BLOOD PRESSURE: 64 MMHG | TEMPERATURE: 97.9 F

## 2020-06-18 DIAGNOSIS — I25.10 CORONARY ARTERY DISEASE INVOLVING NATIVE CORONARY ARTERY OF NATIVE HEART WITHOUT ANGINA PECTORIS: ICD-10-CM

## 2020-06-18 DIAGNOSIS — I48.0 PAF (PAROXYSMAL ATRIAL FIBRILLATION) (HCC): ICD-10-CM

## 2020-06-18 DIAGNOSIS — Z00.00 ENCOUNTER FOR MEDICARE ANNUAL WELLNESS EXAM: Primary | ICD-10-CM

## 2020-06-18 DIAGNOSIS — E78.2 MIXED HYPERLIPIDEMIA: ICD-10-CM

## 2020-06-18 DIAGNOSIS — R73.01 IMPAIRED FASTING GLUCOSE: ICD-10-CM

## 2020-06-18 DIAGNOSIS — I10 ESSENTIAL HYPERTENSION: ICD-10-CM

## 2020-06-18 DIAGNOSIS — Z95.4 H/O AORTIC VALVE REPLACEMENT WITH TISSUE GRAFT: ICD-10-CM

## 2020-06-18 DIAGNOSIS — D50.9 IRON DEFICIENCY ANEMIA, UNSPECIFIED IRON DEFICIENCY ANEMIA TYPE: ICD-10-CM

## 2020-06-18 PROCEDURE — 99214 OFFICE O/P EST MOD 30 MIN: CPT | Performed by: PHYSICIAN ASSISTANT

## 2020-06-18 PROCEDURE — 1160F RVW MEDS BY RX/DR IN RCRD: CPT | Performed by: PHYSICIAN ASSISTANT

## 2020-06-18 PROCEDURE — 4040F PNEUMOC VAC/ADMIN/RCVD: CPT | Performed by: PHYSICIAN ASSISTANT

## 2020-06-18 PROCEDURE — 1036F TOBACCO NON-USER: CPT | Performed by: PHYSICIAN ASSISTANT

## 2020-06-18 PROCEDURE — 1170F FXNL STATUS ASSESSED: CPT | Performed by: PHYSICIAN ASSISTANT

## 2020-06-18 PROCEDURE — 3074F SYST BP LT 130 MM HG: CPT | Performed by: PHYSICIAN ASSISTANT

## 2020-06-18 PROCEDURE — 3078F DIAST BP <80 MM HG: CPT | Performed by: PHYSICIAN ASSISTANT

## 2020-06-18 PROCEDURE — 3008F BODY MASS INDEX DOCD: CPT | Performed by: PHYSICIAN ASSISTANT

## 2020-06-18 PROCEDURE — 1125F AMNT PAIN NOTED PAIN PRSNT: CPT | Performed by: PHYSICIAN ASSISTANT

## 2020-06-18 PROCEDURE — G0439 PPPS, SUBSEQ VISIT: HCPCS | Performed by: PHYSICIAN ASSISTANT

## 2020-06-25 ENCOUNTER — TELEPHONE (OUTPATIENT)
Dept: NEUROLOGY | Facility: CLINIC | Age: 85
End: 2020-06-25

## 2020-06-29 ENCOUNTER — TELEPHONE (OUTPATIENT)
Dept: INTERNAL MEDICINE CLINIC | Facility: CLINIC | Age: 85
End: 2020-06-29

## 2020-07-14 DIAGNOSIS — E78.2 MIXED HYPERLIPIDEMIA: ICD-10-CM

## 2020-07-14 RX ORDER — ROSUVASTATIN CALCIUM 20 MG/1
TABLET, COATED ORAL
Qty: 90 TABLET | Refills: 3 | Status: SHIPPED | OUTPATIENT
Start: 2020-07-14

## 2020-07-17 ENCOUNTER — APPOINTMENT (OUTPATIENT)
Dept: LAB | Facility: CLINIC | Age: 85
End: 2020-07-17
Payer: MEDICARE

## 2020-07-17 DIAGNOSIS — D50.9 IRON DEFICIENCY ANEMIA, UNSPECIFIED IRON DEFICIENCY ANEMIA TYPE: ICD-10-CM

## 2020-07-17 DIAGNOSIS — R41.3 TRANSIENT AMNESIA: ICD-10-CM

## 2020-07-17 LAB
FERRITIN SERPL-MCNC: 138 NG/ML (ref 8–388)
IRON SATN MFR SERPL: 26 %
IRON SERPL-MCNC: 87 UG/DL (ref 65–175)
TIBC SERPL-MCNC: 333 UG/DL (ref 250–450)

## 2020-07-17 PROCEDURE — 82728 ASSAY OF FERRITIN: CPT

## 2020-07-17 PROCEDURE — 83540 ASSAY OF IRON: CPT

## 2020-07-17 PROCEDURE — 36415 COLL VENOUS BLD VENIPUNCTURE: CPT

## 2020-07-17 PROCEDURE — 83550 IRON BINDING TEST: CPT

## 2020-07-24 ENCOUNTER — OFFICE VISIT (OUTPATIENT)
Dept: INTERNAL MEDICINE CLINIC | Facility: CLINIC | Age: 85
End: 2020-07-24
Payer: MEDICARE

## 2020-07-24 VITALS
WEIGHT: 201 LBS | OXYGEN SATURATION: 98 % | TEMPERATURE: 97.9 F | HEART RATE: 76 BPM | DIASTOLIC BLOOD PRESSURE: 72 MMHG | SYSTOLIC BLOOD PRESSURE: 134 MMHG | BODY MASS INDEX: 31.55 KG/M2 | HEIGHT: 67 IN

## 2020-07-24 DIAGNOSIS — I25.10 CORONARY ARTERY DISEASE INVOLVING NATIVE CORONARY ARTERY OF NATIVE HEART WITHOUT ANGINA PECTORIS: ICD-10-CM

## 2020-07-24 DIAGNOSIS — I48.0 PAF (PAROXYSMAL ATRIAL FIBRILLATION) (HCC): ICD-10-CM

## 2020-07-24 DIAGNOSIS — M16.11 PRIMARY OSTEOARTHRITIS OF RIGHT HIP: ICD-10-CM

## 2020-07-24 DIAGNOSIS — D50.9 IRON DEFICIENCY ANEMIA, UNSPECIFIED IRON DEFICIENCY ANEMIA TYPE: ICD-10-CM

## 2020-07-24 DIAGNOSIS — Z01.818 PRE-OP EXAMINATION: Primary | ICD-10-CM

## 2020-07-24 DIAGNOSIS — I10 ESSENTIAL HYPERTENSION: ICD-10-CM

## 2020-07-24 PROCEDURE — 4040F PNEUMOC VAC/ADMIN/RCVD: CPT | Performed by: PHYSICIAN ASSISTANT

## 2020-07-24 PROCEDURE — 3008F BODY MASS INDEX DOCD: CPT | Performed by: PHYSICIAN ASSISTANT

## 2020-07-24 PROCEDURE — 1036F TOBACCO NON-USER: CPT | Performed by: PHYSICIAN ASSISTANT

## 2020-07-24 PROCEDURE — 99214 OFFICE O/P EST MOD 30 MIN: CPT | Performed by: PHYSICIAN ASSISTANT

## 2020-07-24 PROCEDURE — 3078F DIAST BP <80 MM HG: CPT | Performed by: PHYSICIAN ASSISTANT

## 2020-07-24 PROCEDURE — 1160F RVW MEDS BY RX/DR IN RCRD: CPT | Performed by: PHYSICIAN ASSISTANT

## 2020-07-24 PROCEDURE — 3075F SYST BP GE 130 - 139MM HG: CPT | Performed by: PHYSICIAN ASSISTANT

## 2020-07-24 NOTE — PROGRESS NOTES
Assessment/Plan:   General medical exam is acceptable  Labs of CBC/CMP/PT/PTT/INR/sed rate are all acceptable  Patient has upcoming cardiac evaluation for cardiac clearance  We will wait further clearance from Cardiology prior to full clearance  Patient reports he will be discussing his medications with Anesthesiology  If he has any difficulty understanding what medications to take his to contact the office  Addendum:  08/05/2020-cardiology note reviewed  Patient has been cleared  Patient is therefore medically cleared for proposed procedure without further workup indicated  Quality Measures:       No follow-ups on file  Diagnoses and all orders for this visit:    Pre-op examination    Primary osteoarthritis of right hip    Essential hypertension    Iron deficiency anemia, unspecified iron deficiency anemia type    Coronary artery disease involving native coronary artery of native heart without angina pectoris    PAF (paroxysmal atrial fibrillation) ([facility])          Subjective:      Patient ID: Jackeline Taylor is a 80 y o  male  71-year-old white male presents for preop medical clearance for upcoming right total arthroplasty on 8/12/20 by Dr Lobo De Luna MD, of [facility]  Patient has previous left total hip replacement  He has had ongoing pain in his right hip with difficulty ambulating  He currently ambulates with use of a cane  He notes the discomfort affects him sleeping  It is also starting to affect his ADLs  Estefani cp, palp, sob, edema, HA, dizziness, diaphoresis, syncope, visual disturbance  History of coronary artery disease:  Denies chest pain or palpitations  Has upcoming cardiac clearance  Paroxysmal atrial fibrillation:  On Eliquis  Hypertension:  Well controlled  History of iron deficiency anemia:  Current blood work shows H&H to be borderline but stable        ALLERGIES:  Allergies   Allergen Reactions    Simvastatin Allergic Rhinitis       CURRENT MEDICATIONS:    Current Outpatient Medications:     carvedilol (COREG) 12 5 mg tablet, Take 18 75 mg by mouth, Disp: , Rfl:     cyanocobalamin 100 MCG tablet, Take 2,000 mcg by mouth, Disp: , Rfl:     ELIQUIS 5 MG, Take 5 mg by mouth 2 (two) times a day, Disp: , Rfl: 1    furosemide (LASIX) 20 mg tablet, Take 40 mg by mouth daily , Disp: , Rfl:     leuprolide (ELIGARD) 45 MG injection, Inject 45 mg under the skin, Disp: , Rfl:     lifitegrast (XIIDRA) 5 % op solution, Apply 1 drop to eye, Disp: , Rfl:     lisinopril (ZESTRIL) 5 mg tablet, Take 5 mg by mouth, Disp: , Rfl:     Multiple Vitamin (MULTIVITAMIN PO), Take by mouth, Disp: , Rfl:     Omega-3 Fatty Acids (FISH OIL PO), Take 2,400 mg by mouth, Disp: , Rfl:     potassium chloride (KLOR-CON) 20 mEq packet, Take 20 mEq by mouth daily , Disp: , Rfl:     rosuvastatin (CRESTOR) 20 MG tablet, TAKE 1 TABLET DAILY, Disp: 90 tablet, Rfl: 3    aspirin 81 MG tablet, Take 81 mg by mouth 3 (three) times a week , Disp: , Rfl:     ACTIVE PROBLEM LIST:  Patient Active Problem List   Diagnosis    Chronic systolic [facility] failure [facility] )    Essential hypertension    H/O aortic valve replacement with tissue graft    Mixed hyperlipidemia    Impaired fasting glucose    Iron deficiency anemia    Coronary artery disease involving native coronary artery of native heart without angina pectoris    PAF (paroxysmal atrial fibrillation) (HCC)    Transient amnesia       PAST MEDICAL HISTORY:  Past Medical History:   Diagnosis Date    Arthritis     HIP    Cancer of prostate ([facility] [address] ) 1998    removed in [place]    Heart attack ([facility] [address] )     High blood pressure     High cholesterol     Irregular heart beat     has ICD Pacemaker and Medication       PAST SURGICAL HISTORY:  Past Surgical History:   Procedure Laterality Date    AORTIC VALVE REPLACEMENT      CARDIAC DEFIBRILLATOR PLACEMENT      pulse generator    CATARACT EXTRACTION, BILATERAL Bilateral  PROSTATE CANCER GENE 3(PCA3) (HISTORICAL)      removed 1998 Urology Dr Hadley Watkins,     PROSTATECTOMY         FAMILY HISTORY:  Family History   Problem Relation Age of Onset    Emphysema Mother     Substance Abuse Other     Cerebral aneurysm Brother     Dementia Neg Hx        SOCIAL HISTORY:  Social History     Socioeconomic History    Marital status: /Civil Union     Spouse name: Not on file    Number of children: Not on file    Years of education: Not on file    Highest education level: Not on file   Occupational History    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[address] resource strain: Not on file    Food insecurity:     Worry: Not on file     Inability: Not on file   [employer] needs:     Medical: Not on file     Non-medical: Not on file   Tobacco Use    Smoking status: Never Smoker    Smokeless tobacco: Never Used   Substance and Sexual Activity    Alcohol use: Yes     Comment: social    Drug use: No    Sexual activity: Not Currently     Comment: denied: history of high risk sexual behavior   Lifestyle    Physical activity:     Days per week: Not on file     Minutes per session: Not on file    Stress: Not on file   Relationships    Social connections:     Talks on phone: Not on file     Gets together: Not on file     Attends Voodoo service: Not on file     Active member of club or organization: Not on file     Attends meetings of clubs or organizations: Not on file     Relationship status: Not on file    Intimate partner violence:     Fear of current or ex partner: Not on file     Emotionally abused: Not on file     Physically abused: Not on file     Forced sexual activity: Not on file   Other Topics Concern    Not on file   Social History Narrative        2 children 3 step children    Retired-/Material Handling    No teeth    Hobbies-golfing       Review of Systems   Constitutional: Negative for activity change, chills, fatigue and fever  HENT: Positive for hearing loss (Wears bilateral hearing aids)  Negative for congestion  Eyes: Negative for discharge  Respiratory: Negative for cough, chest tightness and shortness of breath  Cardiovascular: Negative for chest pain, palpitations and leg swelling  Gastrointestinal: Negative for abdominal pain, blood in stool, constipation, diarrhea, nausea and vomiting  Endocrine: Negative for polydipsia, polyphagia and polyuria  Genitourinary: Negative for difficulty urinating  Musculoskeletal: Negative for arthralgias and myalgias  Skin: Negative for rash  Allergic/Immunologic: Negative for immunocompromised state  Neurological: Negative for dizziness, syncope, weakness, light-headedness and headaches  Hematological: Negative for adenopathy  Does not bruise/bleed easily  Psychiatric/Behavioral: Negative for dysphoric mood and sleep disturbance  The patient is not nervous/anxious  Objective:  Vitals:    07/24/20 1314   BP: 134/72   BP Location: Left arm   Patient Position: Sitting   Cuff Size: Large   Pulse: 76   Temp: 97 9 °F (36 6 °C)   TempSrc: Tympanic   SpO2: 98%   Weight: 91 2 kg (201 lb)   Height: 5' 7" (1 702 m)     Body mass index is 31 48 kg/m²  Physical Exam   Constitutional: He is oriented to person, place, and time  He appears well-developed and well-nourished  No distress  HENT:   Head: Normocephalic  Nose: Nose normal    Mouth/Throat: Oropharynx is clear and moist  No oropharyngeal exudate  Bilateral hearing aids in place   Eyes: Pupils are equal, round, and reactive to light  Conjunctivae and EOM are normal  Right eye exhibits no discharge  Left eye exhibits no discharge  No scleral icterus  Partial arcus senilis bilaterally   Neck: Normal range of motion  Neck supple  No JVD present  Carotid bruit is not present  No tracheal deviation present  No thyromegaly present     Cardiovascular: Normal rate, regular rhythm, normal heart sounds and intact distal pulses  Exam reveals no gallop and no friction rub  No murmur heard  Pulmonary/Chest: Effort normal and breath sounds normal  No respiratory distress  He has no wheezes  He has no rales  He exhibits no tenderness  Abdominal: Soft  Bowel sounds are normal  He exhibits no distension and no mass  There is no hepatosplenomegaly  There is no tenderness  There is no rebound, no guarding and no CVA tenderness  Musculoskeletal: Normal range of motion  He exhibits no edema, tenderness or deformity  Lymphadenopathy:     He has no cervical adenopathy  Neurological: He is alert and oriented to person, place, and time  He has normal reflexes  He displays normal reflexes  No cranial nerve deficit or sensory deficit  He exhibits normal muscle tone  Coordination normal    Skin: Skin is warm and dry  No rash noted  Psychiatric: He has a normal mood and affect  His behavior is normal  Judgment and thought content normal    Nursing note and vitals reviewed  RESULTS:    Recent Results (from the past 1008 hour(s))   Iron Saturation %    Collection Time: 07/17/20 10:10 AM   Result Value Ref Range    Iron Saturation 26 %    TIBC 333 250 - 450 ug/dL    Iron 87 65 - 175 ug/dL   Ferritin    Collection Time: 07/17/20 10:10 AM   Result Value Ref Range    Ferritin 138 8 - 388 ng/mL       This note was created with voice recognition software  Phonic, grammatical and spelling errors may be present within the note as a result

## 2020-08-05 ENCOUNTER — TELEPHONE (OUTPATIENT)
Dept: INTERNAL MEDICINE CLINIC | Facility: CLINIC | Age: 85
End: 2020-08-05

## 2020-08-24 ENCOUNTER — TELEPHONE (OUTPATIENT)
Dept: INTERNAL MEDICINE CLINIC | Facility: CLINIC | Age: 85
End: 2020-08-24

## 2020-08-24 NOTE — TELEPHONE ENCOUNTER
Patient was admitted to [place] [address]  for rehab from hip replacement surgery  They changed the patient's Lasix -Furosemide 20 mg from 2 pills a day to 1 pill a day please advise if you want the patient to go back to his regular dose    Patient not able to come into the office at this time and does not have the capability to do a virtual visit  Please advise    Clare Lr

## 2020-08-26 NOTE — TELEPHONE ENCOUNTER
Patient called back today and said that when he goes to bed around 10:00 pm he will read for about two hours  Patient props 3 pillows behind him but he doesn't sit up as far as he does in his recliner  He has noticed that being he lays back a little further that he some times has a hard time to breath? Patient wanted to know if this could be coming from cutting the Lasix in half? Please advise    He said that the visiting nurses will be in today around three

## 2020-08-27 ENCOUNTER — TELEPHONE (OUTPATIENT)
Dept: INTERNAL MEDICINE CLINIC | Facility: CLINIC | Age: 85
End: 2020-08-27

## 2020-08-27 NOTE — TELEPHONE ENCOUNTER
Patient had some blood in his urine today  He is not having any other symptoms  He is not sure if he is getting a UTI but this does concern him  He did increase his lasix this morning and took 2 20 mg tablets  Please advise        Call back #[phone]

## 2020-09-09 ENCOUNTER — APPOINTMENT (OUTPATIENT)
Dept: LAB | Facility: CLINIC | Age: 85
End: 2020-09-09
Payer: MEDICARE

## 2020-09-09 LAB
FOLATE SERPL-MCNC: >20 NG/ML (ref 3.1–17.5)
TSH SERPL DL<=0.05 MIU/L-ACNC: 1.34 UIU/ML (ref 0.36–3.74)
VIT B12 SERPL-MCNC: 1105 PG/ML (ref 100–900)

## 2020-09-09 PROCEDURE — 82607 VITAMIN B-12: CPT

## 2020-09-09 PROCEDURE — 86592 SYPHILIS TEST NON-TREP QUAL: CPT

## 2020-09-09 PROCEDURE — 82746 ASSAY OF FOLIC ACID SERUM: CPT

## 2020-09-09 PROCEDURE — 84443 ASSAY THYROID STIM HORMONE: CPT

## 2020-09-09 PROCEDURE — 36415 COLL VENOUS BLD VENIPUNCTURE: CPT

## 2020-09-10 LAB — RPR SER QL: NORMAL

## 2020-09-18 ENCOUNTER — TELEPHONE (OUTPATIENT)
Dept: INTERNAL MEDICINE CLINIC | Facility: CLINIC | Age: 85
End: 2020-09-18

## 2020-09-18 NOTE — TELEPHONE ENCOUNTER
Patient called to see if we had gotten the letter from Dr Jonathan Meyer on patient eye  They found plaque in a very small artery near the optic nerve of his right eye  Patient feels like someone should have gotten back to him by now  Patient said that he feels like he is in limbo and would like to know what is going on? Please advise    Filiberto Side [place] Side Filiberto Side

## 2020-09-18 NOTE — TELEPHONE ENCOUNTER
I see mention of this in his cardiology note from yesterday's visit and his cardiologist ordered further tests for it  So that's what he needs done  He is already on aspirin and eliquis so he is covered for a blood thinner  We would have referred him to cardiology as well

## 2020-09-18 NOTE — TELEPHONE ENCOUNTER
Called and informed patient  He stated that his cardiologist told him he can not increase the amount of Eliqis he's on, and he has not taken aspirin for over a year

## 2020-10-20 ENCOUNTER — IMMUNIZATIONS (OUTPATIENT)
Dept: INTERNAL MEDICINE CLINIC | Facility: CLINIC | Age: 85
End: 2020-10-20
Payer: MEDICARE

## 2020-10-20 DIAGNOSIS — Z23 NEED FOR INFLUENZA VACCINATION: Primary | ICD-10-CM

## 2020-10-20 PROCEDURE — 90662 IIV NO PRSV INCREASED AG IM: CPT

## 2020-10-20 PROCEDURE — G0008 ADMIN INFLUENZA VIRUS VAC: HCPCS

## 2020-12-30 ENCOUNTER — TELEPHONE (OUTPATIENT)
Dept: INTERNAL MEDICINE CLINIC | Facility: CLINIC | Age: 85
End: 2020-12-30

## 2020-12-30 DIAGNOSIS — I10 ESSENTIAL HYPERTENSION: Primary | ICD-10-CM

## 2021-01-04 ENCOUNTER — TRANSCRIBE ORDERS (OUTPATIENT)
Dept: ADMINISTRATIVE | Facility: HOSPITAL | Age: 86
End: 2021-01-04

## 2021-01-04 ENCOUNTER — LAB (OUTPATIENT)
Dept: LAB | Facility: CLINIC | Age: 86
End: 2021-01-04
Payer: MEDICARE

## 2021-01-04 DIAGNOSIS — C61 MALIGNANT NEOPLASM OF PROSTATE (HCC): Primary | ICD-10-CM

## 2021-01-04 DIAGNOSIS — C61 MALIGNANT NEOPLASM OF PROSTATE (HCC): ICD-10-CM

## 2021-01-04 DIAGNOSIS — I10 ESSENTIAL HYPERTENSION: ICD-10-CM

## 2021-01-04 LAB
ALBUMIN SERPL BCP-MCNC: 3.7 G/DL (ref 3.5–5)
ALP SERPL-CCNC: 58 U/L (ref 46–116)
ALT SERPL W P-5'-P-CCNC: 21 U/L (ref 12–78)
ANION GAP SERPL CALCULATED.3IONS-SCNC: 2 MMOL/L (ref 4–13)
AST SERPL W P-5'-P-CCNC: 17 U/L (ref 5–45)
BASOPHILS # BLD AUTO: 0.05 THOUSANDS/ΜL (ref 0–0.1)
BASOPHILS NFR BLD AUTO: 1 % (ref 0–1)
BILIRUB SERPL-MCNC: 0.51 MG/DL (ref 0.2–1)
BUN SERPL-MCNC: 17 MG/DL (ref 5–25)
CALCIUM SERPL-MCNC: 9.5 MG/DL (ref 8.3–10.1)
CHLORIDE SERPL-SCNC: 107 MMOL/L (ref 100–108)
CHOLEST SERPL-MCNC: 171 MG/DL (ref 50–200)
CO2 SERPL-SCNC: 30 MMOL/L (ref 21–32)
CREAT SERPL-MCNC: 0.83 MG/DL (ref 0.6–1.3)
EOSINOPHIL # BLD AUTO: 0.15 THOUSAND/ΜL (ref 0–0.61)
EOSINOPHIL NFR BLD AUTO: 2 % (ref 0–6)
ERYTHROCYTE [DISTWIDTH] IN BLOOD BY AUTOMATED COUNT: 13.6 % (ref 11.6–15.1)
GFR SERPL CREATININE-BSD FRML MDRD: 80 ML/MIN/1.73SQ M
GLUCOSE P FAST SERPL-MCNC: 99 MG/DL (ref 65–99)
HCT VFR BLD AUTO: 36.4 % (ref 36.5–49.3)
HDLC SERPL-MCNC: 68 MG/DL
HGB BLD-MCNC: 11.9 G/DL (ref 12–17)
IMM GRANULOCYTES # BLD AUTO: 0.01 THOUSAND/UL (ref 0–0.2)
IMM GRANULOCYTES NFR BLD AUTO: 0 % (ref 0–2)
LDLC SERPL CALC-MCNC: 85 MG/DL (ref 0–100)
LYMPHOCYTES # BLD AUTO: 1.77 THOUSANDS/ΜL (ref 0.6–4.47)
LYMPHOCYTES NFR BLD AUTO: 25 % (ref 14–44)
MCH RBC QN AUTO: 30.6 PG (ref 26.8–34.3)
MCHC RBC AUTO-ENTMCNC: 32.7 G/DL (ref 31.4–37.4)
MCV RBC AUTO: 94 FL (ref 82–98)
MONOCYTES # BLD AUTO: 0.64 THOUSAND/ΜL (ref 0.17–1.22)
MONOCYTES NFR BLD AUTO: 9 % (ref 4–12)
NEUTROPHILS # BLD AUTO: 4.48 THOUSANDS/ΜL (ref 1.85–7.62)
NEUTS SEG NFR BLD AUTO: 63 % (ref 43–75)
NONHDLC SERPL-MCNC: 103 MG/DL
NRBC BLD AUTO-RTO: 0 /100 WBCS
PLATELET # BLD AUTO: 119 THOUSANDS/UL (ref 149–390)
PMV BLD AUTO: 10.6 FL (ref 8.9–12.7)
POTASSIUM SERPL-SCNC: 4.3 MMOL/L (ref 3.5–5.3)
PROT SERPL-MCNC: 7 G/DL (ref 6.4–8.2)
PSA SERPL-MCNC: <0.1 NG/ML (ref 0–4)
RBC # BLD AUTO: 3.89 MILLION/UL (ref 3.88–5.62)
SODIUM SERPL-SCNC: 139 MMOL/L (ref 136–145)
TRIGL SERPL-MCNC: 90 MG/DL
WBC # BLD AUTO: 7.1 THOUSAND/UL (ref 4.31–10.16)

## 2021-01-04 PROCEDURE — 80061 LIPID PANEL: CPT

## 2021-01-04 PROCEDURE — 36415 COLL VENOUS BLD VENIPUNCTURE: CPT

## 2021-01-04 PROCEDURE — 84153 ASSAY OF PSA TOTAL: CPT

## 2021-01-04 PROCEDURE — 85025 COMPLETE CBC W/AUTO DIFF WBC: CPT

## 2021-01-04 PROCEDURE — 80053 COMPREHEN METABOLIC PANEL: CPT

## 2021-01-07 ENCOUNTER — OFFICE VISIT (OUTPATIENT)
Dept: INTERNAL MEDICINE CLINIC | Facility: CLINIC | Age: 86
End: 2021-01-07
Payer: MEDICARE

## 2021-01-07 VITALS
HEART RATE: 77 BPM | OXYGEN SATURATION: 95 % | BODY MASS INDEX: 30.98 KG/M2 | RESPIRATION RATE: 16 BRPM | WEIGHT: 197.4 LBS | DIASTOLIC BLOOD PRESSURE: 62 MMHG | HEIGHT: 67 IN | SYSTOLIC BLOOD PRESSURE: 124 MMHG | TEMPERATURE: 98.1 F

## 2021-01-07 DIAGNOSIS — I48.0 PAF (PAROXYSMAL ATRIAL FIBRILLATION) (HCC): ICD-10-CM

## 2021-01-07 DIAGNOSIS — D50.9 IRON DEFICIENCY ANEMIA, UNSPECIFIED IRON DEFICIENCY ANEMIA TYPE: ICD-10-CM

## 2021-01-07 DIAGNOSIS — Z95.4 H/O AORTIC VALVE REPLACEMENT WITH TISSUE GRAFT: ICD-10-CM

## 2021-01-07 DIAGNOSIS — D69.6 THROMBOCYTOPENIA (HCC): ICD-10-CM

## 2021-01-07 DIAGNOSIS — I10 ESSENTIAL HYPERTENSION: Primary | ICD-10-CM

## 2021-01-07 DIAGNOSIS — E78.2 MIXED HYPERLIPIDEMIA: ICD-10-CM

## 2021-01-07 PROCEDURE — 99214 OFFICE O/P EST MOD 30 MIN: CPT | Performed by: PHYSICIAN ASSISTANT

## 2021-01-07 RX ORDER — FERROUS SULFATE 325(65) MG
325 TABLET ORAL
COMMUNITY

## 2021-01-07 NOTE — PROGRESS NOTES
Assessment/Plan:      Patient Instructions   No change in current medical management  Will bring patient back in 4 months with repeat labs prior to that visit  Follow-up sooner as needed  Quality Measures: BMI Counseling: Body mass index is 30 92 kg/m²  The BMI is above normal  Nutrition recommendations include decreasing portion sizes, encouraging healthy choices of fruits and vegetables and moderation in carbohydrate intake  Exercise recommendations include exercising 3-5 times per week  Return in about 4 months (around 5/7/2021) for Next scheduled follow up  Diagnoses and all orders for this visit:    Essential hypertension  -     Comprehensive metabolic panel; Future    PAF (paroxysmal atrial fibrillation) (HCC)    H/O aortic valve replacement with tissue graft    Iron deficiency anemia, unspecified iron deficiency anemia type    Mixed hyperlipidemia  -     Lipid panel; Future    Thrombocytopenia (HCC)  -     CBC and differential; Future    Other orders  -     Iron-Vitamin C (IRON 100/C PO); Take by mouth  -     ferrous sulfate 325 (65 Fe) mg tablet; Take 325 mg by mouth daily with breakfast          Subjective:      Patient ID: Simuel Kocher is a 80 y o  male  Follow-up     Since our last visit patient had undergone the right hip replacement and did very well postoperatively  He is stating that 1 month postop he was on the golf course able to chip and put         Reports feeling well  Labs reviewed with patient  Hypertension:  Good control on current medications  Estefani cp, palp, sob, edema, HA, dizziness, diaphoresis, syncope, visual disturbance  Paroxysmal atrial fibrillation / history of aortic valve replacement/ coronary disease  Follows with Cardiology on a regular basis  Hyperlipidemia:  Slight elevation in his cholesterol since his last visit  He will modify his diet to bring this down  Thrombocytopenia:  Most likely related to medications he is on    Will monitor  Iron deficiency anemia:  Patient replacing his iron and H&H is improving  Will monitor            ALLERGIES:  Allergies   Allergen Reactions    Simvastatin Allergic Rhinitis       CURRENT MEDICATIONS:    Current Outpatient Medications:     carvedilol (COREG) 12 5 mg tablet, Take 18 75 mg by mouth, Disp: , Rfl:     cyanocobalamin 100 MCG tablet, Take 2,000 mcg by mouth, Disp: , Rfl:     ELIQUIS 5 MG, Take 5 mg by mouth 2 (two) times a day, Disp: , Rfl: 1    ferrous sulfate 325 (65 Fe) mg tablet, Take 325 mg by mouth daily with breakfast, Disp: , Rfl:     furosemide (LASIX) 20 mg tablet, Take 40 mg by mouth daily , Disp: , Rfl:     Iron-Vitamin C (IRON 100/C PO), Take by mouth, Disp: , Rfl:     leuprolide (ELIGARD) 45 MG injection, Inject 45 mg under the skin, Disp: , Rfl:     lifitegrast (XIIDRA) 5 % op solution, Apply 1 drop to eye, Disp: , Rfl:     lisinopril (ZESTRIL) 5 mg tablet, Take 5 mg by mouth, Disp: , Rfl:     Multiple Vitamin (MULTIVITAMIN PO), Take by mouth, Disp: , Rfl:     Omega-3 Fatty Acids (FISH OIL PO), Take 2,400 mg by mouth, Disp: , Rfl:     potassium chloride (KLOR-CON) 20 mEq packet, Take 20 mEq by mouth daily , Disp: , Rfl:     rosuvastatin (CRESTOR) 20 MG tablet, TAKE 1 TABLET DAILY, Disp: 90 tablet, Rfl: 3    aspirin 81 MG tablet, Take 81 mg by mouth 3 (three) times a week , Disp: , Rfl:     ACTIVE PROBLEM LIST:  Patient Active Problem List   Diagnosis    [facility]    Essential hypertension    H/O aortic valve replacement with tissue graft    Mixed hyperlipidemia    Impaired fasting glucose    Iron deficiency anemia    Coronary artery disease involving native coronary artery of native heart without angina pectoris    PAF (paroxysmal atrial fibrillation) (HCC)    Transient amnesia    Thrombocytopenia (HCC)       PAST MEDICAL HISTORY:  Past Medical History:   Diagnosis Date    Arthritis     HIP    Cancer of prostate ([facility] [address] ) 1998 removed in [place] [facility] [address] Em[facility] Steve    Heart attack [facility])     High blood pressure     High cholesterol     Irregular heart beat     has ICD Pacemaker and Medication       PAST SURGICAL HISTORY:  Past Surgical History:   Procedure Laterality Date    AORTIC VALVE REPLACEMENT      CARDIAC DEFIBRILLATOR PLACEMENT      pulse generator    CATARACT EXTRACTION, BILATERAL Bilateral     PROSTATE CANCER GENE 3(PCA3) (HISTORICAL)      removed 1998 Urology Dr Lotus Bashir,     PROSTATECTOMY         FAMILY HISTORY:  Family History   Problem Relation Age of Onset    Emphysema Mother     Substance Abuse Other     Cerebral aneurysm Brother     Dementia Neg Hx        SOCIAL HISTORY:  Social History     Socioeconomic History    Marital status: /Civil Union     Spouse name: Not on file    Number of children: Not on file    Years of education: Not on file    Highest education level: Not on file   Occupational History    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[address] resource strain: Not on file    Food insecurity     Worry: Not on file     Inability: Not on file   [employer] needs     Medical: Not on file     Non-medical: Not on file   Tobacco Use    Smoking status: Never Smoker    Smokeless tobacco: Never Used   Substance and Sexual Activity    Alcohol use: Yes     Comment: social    Drug use: No    Sexual activity: Not Currently     Comment: denied: history of high risk sexual behavior   Lifestyle    Physical activity     Days per week: Not on file     Minutes per session: Not on file    Stress: Not on file   Relationships    Social connections     Talks on phone: Not on file     Gets together: Not on file     Attends Muslim service: Not on file     Active member of club or organization: Not on file     Attends meetings of clubs or organizations: Not on file     Relationship status: Not on file    Intimate partner violence     Fear of current or ex partner: Not on file     Emotionally abused: Not on file     Physically abused: Not on file     Forced sexual activity: Not on file   Other Topics Concern    Not on file   Social History Narrative        2 children 3 step children    Retired-/Material Handling    No teeth    Hobbies-golfing       Review of Systems   Constitutional: Negative for activity change, chills, fatigue and fever  HENT: Negative for congestion  Eyes: Positive for visual disturbance (  History of recent [place]ollen[place] plaque)  Negative for discharge  Respiratory: Negative for cough, chest tightness and shortness of breath  Cardiovascular: Negative for chest pain, palpitations and leg swelling  Gastrointestinal: Negative for abdominal pain, blood in stool, constipation, diarrhea, nausea and vomiting  Endocrine: Negative for polydipsia, polyphagia and polyuria  Genitourinary: Negative for difficulty urinating  Musculoskeletal: Negative for arthralgias and myalgias  Skin: Negative for rash  Allergic/Immunologic: Negative for immunocompromised state  Neurological: Negative for dizziness, syncope, weakness, light-headedness and headaches  Hematological: Negative for adenopathy  Does not bruise/bleed easily  Psychiatric/Behavioral: Negative for dysphoric mood and sleep disturbance  The patient is not nervous/anxious  Objective:  Vitals:    01/07/21 1111   BP: 124/62   BP Location: Left arm   Patient Position: Sitting   Cuff Size: Standard   Pulse: 77   Resp: 16   Temp: 98 1 °F (36 7 °C)   TempSrc: Tympanic   SpO2: 95%   Weight: 89 5 kg (197 lb 6 4 oz)   Height: 5' 7" (1 702 m)     Body mass index is 30 92 kg/m²  Physical Exam  Vitals signs and nursing note reviewed  Constitutional:       General: He is not in acute distress  Appearance: He is well-developed  He is not ill-appearing  HENT:      Head: Normocephalic and atraumatic  Eyes:      Pupils: Pupils are equal, round, and reactive to light  Neck:      Musculoskeletal: Neck supple  Thyroid: No thyromegaly  Vascular: No carotid bruit or JVD  Cardiovascular:      Rate and Rhythm: Normal rate and regular rhythm  Heart sounds: Normal heart sounds  Comments: Pacemaker left anterior chest wall  Pulmonary:      Effort: Pulmonary effort is normal  No respiratory distress  Breath sounds: Normal breath sounds  Musculoskeletal:      Right lower leg: No edema  Left lower leg: No edema  Lymphadenopathy:      Cervical: No cervical adenopathy  Skin:     General: Skin is warm and dry  Findings: No rash  Neurological:      General: No focal deficit present  Mental Status: He is alert and oriented to person, place, and time  Mental status is at baseline     Psychiatric:         Mood and Affect: Mood normal          Behavior: Behavior normal            RESULTS:    Recent Results (from the past 1008 hour(s))   CBC and differential    Collection Time: 01/04/21  9:18 AM   Result Value Ref Range    WBC 7 10 4 31 - 10 16 Thousand/uL    RBC 3 89 3 88 - 5 62 Million/uL    Hemoglobin 11 9 (L) 12 0 - 17 0 g/dL    Hematocrit 36 4 (L) 36 5 - 49 3 %    MCV 94 82 - 98 fL    MCH 30 6 26 8 - 34 3 pg    MCHC 32 7 31 4 - 37 4 g/dL    RDW 13 6 11 6 - 15 1 %    MPV 10 6 8 9 - 12 7 fL    Platelets 702 (L) 250 - 390 Thousands/uL    nRBC 0 /100 WBCs    Neutrophils Relative 63 43 - 75 %    Immat GRANS % 0 0 - 2 %    Lymphocytes Relative 25 14 - 44 %    Monocytes Relative 9 4 - 12 %    Eosinophils Relative 2 0 - 6 %    Basophils Relative 1 0 - 1 %    Neutrophils Absolute 4 48 1 85 - 7 62 Thousands/µL    Immature Grans Absolute 0 01 0 00 - 0 20 Thousand/uL    Lymphocytes Absolute 1 77 0 60 - 4 47 Thousands/µL    Monocytes Absolute 0 64 0 17 - 1 22 Thousand/µL    Eosinophils Absolute 0 15 0 00 - 0 61 Thousand/µL    Basophils Absolute 0 05 0 00 - 0 10 Thousands/µL   Comprehensive metabolic panel    Collection Time: 01/04/21  9:18 AM   Result Value Ref Range    Sodium 139 136 - 145 mmol/L    Potassium 4 3 3 5 - 5 3 mmol/L    Chloride 107 100 - 108 mmol/L    CO2 30 21 - 32 mmol/L    ANION GAP 2 (L) 4 - 13 mmol/L    BUN 17 5 - 25 mg/dL    Creatinine 0 83 0 60 - 1 30 mg/dL    Glucose, Fasting 99 65 - 99 mg/dL    Calcium 9 5 8 3 - 10 1 mg/dL    AST 17 5 - 45 U/L    ALT 21 12 - 78 U/L    Alkaline Phosphatase 58 46 - 116 U/L    Total Protein 7 0 6 4 - 8 2 g/dL    Albumin 3 7 3 5 - 5 0 g/dL    Total Bilirubin 0 51 0 20 - 1 00 mg/dL    eGFR 80 ml/min/1 73sq m   Lipid panel    Collection Time: 01/04/21  9:18 AM   Result Value Ref Range    Cholesterol 171 50 - 200 mg/dL    Triglycerides 90 <=150 mg/dL    HDL, Direct 68 >=40 mg/dL    LDL Calculated 85 0 - 100 mg/dL    Non-HDL-Chol (CHOL-HDL) 103 mg/dl   PSA Total, Diagnostic    Collection Time: 01/04/21  9:18 AM   Result Value Ref Range    PSA, Diagnostic <0 1 0 0 - 4 0 ng/mL       This note was created with voice recognition software  Phonic, grammatical and spelling errors may be present within the note as a result

## 2021-01-07 NOTE — PATIENT INSTRUCTIONS
No change in current medical management  Will bring patient back in 4 months with repeat labs prior to that visit  Follow-up sooner as needed

## 2021-03-20 ENCOUNTER — IMMUNIZATIONS (OUTPATIENT)
Dept: FAMILY MEDICINE CLINIC | Facility: HOSPITAL | Age: 86
End: 2021-03-20

## 2021-03-20 DIAGNOSIS — Z23 ENCOUNTER FOR IMMUNIZATION: Primary | ICD-10-CM

## 2021-03-20 PROCEDURE — 91300 SARS-COV-2 / COVID-19 MRNA VACCINE (PFIZER-BIONTECH) 30 MCG: CPT

## 2021-03-20 PROCEDURE — 0001A SARS-COV-2 / COVID-19 MRNA VACCINE (PFIZER-BIONTECH) 30 MCG: CPT

## 2021-04-10 ENCOUNTER — IMMUNIZATIONS (OUTPATIENT)
Dept: FAMILY MEDICINE CLINIC | Facility: HOSPITAL | Age: 86
End: 2021-04-10

## 2021-04-10 DIAGNOSIS — Z23 ENCOUNTER FOR IMMUNIZATION: Primary | ICD-10-CM

## 2021-04-10 PROCEDURE — 0002A SARS-COV-2 / COVID-19 MRNA VACCINE (PFIZER-BIONTECH) 30 MCG: CPT

## 2021-04-10 PROCEDURE — 91300 SARS-COV-2 / COVID-19 MRNA VACCINE (PFIZER-BIONTECH) 30 MCG: CPT

## 2021-05-04 ENCOUNTER — APPOINTMENT (OUTPATIENT)
Dept: LAB | Facility: CLINIC | Age: 86
End: 2021-05-04
Payer: MEDICARE

## 2021-05-04 DIAGNOSIS — I10 ESSENTIAL HYPERTENSION: ICD-10-CM

## 2021-05-04 DIAGNOSIS — D69.6 THROMBOCYTOPENIA (HCC): ICD-10-CM

## 2021-05-04 DIAGNOSIS — E78.2 MIXED HYPERLIPIDEMIA: ICD-10-CM

## 2021-05-04 LAB
ALBUMIN SERPL BCP-MCNC: 3.6 G/DL (ref 3.5–5)
ALP SERPL-CCNC: 51 U/L (ref 46–116)
ALT SERPL W P-5'-P-CCNC: 26 U/L (ref 12–78)
ANION GAP SERPL CALCULATED.3IONS-SCNC: 8 MMOL/L (ref 4–13)
AST SERPL W P-5'-P-CCNC: 24 U/L (ref 5–45)
BASOPHILS # BLD AUTO: 0.06 THOUSANDS/ΜL (ref 0–0.1)
BASOPHILS NFR BLD AUTO: 1 % (ref 0–1)
BILIRUB SERPL-MCNC: 0.65 MG/DL (ref 0.2–1)
BUN SERPL-MCNC: 21 MG/DL (ref 5–25)
CALCIUM SERPL-MCNC: 8.8 MG/DL (ref 8.3–10.1)
CHLORIDE SERPL-SCNC: 109 MMOL/L (ref 100–108)
CHOLEST SERPL-MCNC: 123 MG/DL (ref 50–200)
CO2 SERPL-SCNC: 24 MMOL/L (ref 21–32)
CREAT SERPL-MCNC: 0.88 MG/DL (ref 0.6–1.3)
EOSINOPHIL # BLD AUTO: 0.31 THOUSAND/ΜL (ref 0–0.61)
EOSINOPHIL NFR BLD AUTO: 6 % (ref 0–6)
ERYTHROCYTE [DISTWIDTH] IN BLOOD BY AUTOMATED COUNT: 13.4 % (ref 11.6–15.1)
GFR SERPL CREATININE-BSD FRML MDRD: 78 ML/MIN/1.73SQ M
GLUCOSE P FAST SERPL-MCNC: 110 MG/DL (ref 65–99)
HCT VFR BLD AUTO: 32.9 % (ref 36.5–49.3)
HDLC SERPL-MCNC: 70 MG/DL
HGB BLD-MCNC: 11.1 G/DL (ref 12–17)
IMM GRANULOCYTES # BLD AUTO: 0.01 THOUSAND/UL (ref 0–0.2)
IMM GRANULOCYTES NFR BLD AUTO: 0 % (ref 0–2)
LDLC SERPL CALC-MCNC: 40 MG/DL (ref 0–100)
LYMPHOCYTES # BLD AUTO: 1.09 THOUSANDS/ΜL (ref 0.6–4.47)
LYMPHOCYTES NFR BLD AUTO: 22 % (ref 14–44)
MCH RBC QN AUTO: 31.7 PG (ref 26.8–34.3)
MCHC RBC AUTO-ENTMCNC: 33.7 G/DL (ref 31.4–37.4)
MCV RBC AUTO: 94 FL (ref 82–98)
MONOCYTES # BLD AUTO: 0.46 THOUSAND/ΜL (ref 0.17–1.22)
MONOCYTES NFR BLD AUTO: 9 % (ref 4–12)
NEUTROPHILS # BLD AUTO: 3.11 THOUSANDS/ΜL (ref 1.85–7.62)
NEUTS SEG NFR BLD AUTO: 62 % (ref 43–75)
NONHDLC SERPL-MCNC: 53 MG/DL
NRBC BLD AUTO-RTO: 0 /100 WBCS
PLATELET # BLD AUTO: 129 THOUSANDS/UL (ref 149–390)
PMV BLD AUTO: 10.8 FL (ref 8.9–12.7)
POTASSIUM SERPL-SCNC: 4 MMOL/L (ref 3.5–5.3)
PROT SERPL-MCNC: 6.8 G/DL (ref 6.4–8.2)
RBC # BLD AUTO: 3.5 MILLION/UL (ref 3.88–5.62)
SODIUM SERPL-SCNC: 141 MMOL/L (ref 136–145)
TRIGL SERPL-MCNC: 67 MG/DL
WBC # BLD AUTO: 5.04 THOUSAND/UL (ref 4.31–10.16)

## 2021-05-04 PROCEDURE — 80053 COMPREHEN METABOLIC PANEL: CPT

## 2021-05-04 PROCEDURE — 85025 COMPLETE CBC W/AUTO DIFF WBC: CPT

## 2021-05-04 PROCEDURE — 80061 LIPID PANEL: CPT

## 2021-05-04 PROCEDURE — 36415 COLL VENOUS BLD VENIPUNCTURE: CPT

## 2021-05-11 ENCOUNTER — OFFICE VISIT (OUTPATIENT)
Dept: INTERNAL MEDICINE CLINIC | Facility: CLINIC | Age: 86
End: 2021-05-11
Payer: MEDICARE

## 2021-05-11 VITALS
BODY MASS INDEX: 29.51 KG/M2 | HEIGHT: 67 IN | HEART RATE: 72 BPM | OXYGEN SATURATION: 96 % | WEIGHT: 188 LBS | DIASTOLIC BLOOD PRESSURE: 70 MMHG | SYSTOLIC BLOOD PRESSURE: 118 MMHG | TEMPERATURE: 97.8 F | RESPIRATION RATE: 16 BRPM

## 2021-05-11 DIAGNOSIS — E78.2 MIXED HYPERLIPIDEMIA: ICD-10-CM

## 2021-05-11 DIAGNOSIS — I10 ESSENTIAL HYPERTENSION: Primary | ICD-10-CM

## 2021-05-11 DIAGNOSIS — D50.9 IRON DEFICIENCY ANEMIA, UNSPECIFIED IRON DEFICIENCY ANEMIA TYPE: ICD-10-CM

## 2021-05-11 DIAGNOSIS — R73.01 IMPAIRED FASTING GLUCOSE: ICD-10-CM

## 2021-05-11 DIAGNOSIS — D69.6 THROMBOCYTOPENIA (HCC): ICD-10-CM

## 2021-05-11 LAB — SL AMB POCT HEMOGLOBIN AIC: 5.9 (ref ?–6.5)

## 2021-05-11 PROCEDURE — 83036 HEMOGLOBIN GLYCOSYLATED A1C: CPT | Performed by: PHYSICIAN ASSISTANT

## 2021-05-11 PROCEDURE — 99214 OFFICE O/P EST MOD 30 MIN: CPT | Performed by: PHYSICIAN ASSISTANT

## 2021-05-11 NOTE — PROGRESS NOTES
Assessment/Plan:   Patient Instructions   No change in current medications  Will schedule follow-up in 4 months, make that the Medicare annual wellness visit  Labs to do prior to that visit  Follow-up sooner as needed  Quality Measures:       Return in about 4 months (around 9/11/2021) for Next scheduled follow up-Pt preference  Diagnoses and all orders for this visit:    Essential hypertension  -     Comprehensive metabolic panel; Future    Impaired fasting glucose  -     POCT hemoglobin A1c    Mixed hyperlipidemia    Iron deficiency anemia, unspecified iron deficiency anemia type  -     CBC and differential; Future    Thrombocytopenia (HCC)  -     CBC and differential; Future          Subjective:      Patient ID: Michelle Gorman is a 80 y o  male  Follow-up     Labs reviewed with patient  Hypertension:  Very good control on current medication  Estefani cp, palp, sob, edema, HA, dizziness, diaphoresis, syncope, visual disturbance  Hyperlipidemia:  Labs show excellent control  Currently on high dose rosuvastatin  Tolerates medication without difficulty  Impaired fasting glucose:  Fasting sugar 110, A1c done in the office today is 5 8  This was explained to the patient  He was encouraged to try to cut down his intake of concentrated sweets  Will monitor  Iron deficiency anemia:  H&H shows a slight drop but not significant  Patient not aware of any melena or bleeding  Thrombocytopenia: Chronic  Stable on labs  Recent increase in psychosocial stressors in that patient has taken in his granddaughter and great granddaughter to live with them  They had lost their rental property next door  Patient states he and his wife remodeled their house and painted in order to take in the family members  He notes it is an adjustment, but they are working on it  He is positive about the decision        ALLERGIES:  Allergies   Allergen Reactions    Simvastatin Allergic Rhinitis CURRENT MEDICATIONS:    Current Outpatient Medications:     aspirin 81 MG tablet, Take 81 mg by mouth 3 (three) times a week , Disp: , Rfl:     carvedilol (COREG) 12 5 mg tablet, Take 18 75 mg by mouth, Disp: , Rfl:     cyanocobalamin 100 MCG tablet, Take 2,000 mcg by mouth, Disp: , Rfl:     ELIQUIS 5 MG, Take 5 mg by mouth 2 (two) times a day, Disp: , Rfl: 1    ferrous sulfate 325 (65 Fe) mg tablet, Take 325 mg by mouth daily with breakfast, Disp: , Rfl:     furosemide (LASIX) 20 mg tablet, Take 40 mg by mouth daily , Disp: , Rfl:     Iron-Vitamin C (IRON 100/C PO), Take by mouth, Disp: , Rfl:     leuprolide (ELIGARD) 45 MG injection, Inject 45 mg under the skin, Disp: , Rfl:     lifitegrast (XIIDRA) 5 % op solution, Apply 1 drop to eye, Disp: , Rfl:     lisinopril (ZESTRIL) 5 mg tablet, Take 5 mg by mouth, Disp: , Rfl:     Multiple Vitamin (MULTIVITAMIN PO), Take by mouth, Disp: , Rfl:     Omega-3 Fatty Acids (FISH OIL PO), Take 2,400 mg by mouth, Disp: , Rfl:     potassium chloride (KLOR-CON) 20 mEq packet, Take 20 mEq by mouth daily , Disp: , Rfl:     rosuvastatin (CRESTOR) 20 MG tablet, TAKE 1 TABLET DAILY, Disp: 90 tablet, Rfl: 3    ACTIVE PROBLEM LIST:  Patient Active Problem List   Diagnosis    Chronic systolic heart failure ([facility] [address] )    Essential hypertension    H/O aortic valve replacement with tissue graft    Mixed hyperlipidemia    Impaired fasting glucose    Iron deficiency anemia    Coronary artery disease involving native coronary artery of native heart without angina pectoris    PAF (paroxysmal atrial fibrillation) (HCC)    Transient amnesia    Thrombocytopenia (HCC)       PAST MEDICAL HISTORY:  Past Medical History:   Diagnosis Date    Arthritis     HIP    Cancer of prostate ([facility] [address] ) 1998    removed in [place]    Heart attack [facility])     High blood pressure     High cholesterol     Irregular heart beat     has ICD Pacemaker and Medication PAST SURGICAL HISTORY:  Past Surgical History:   Procedure Laterality Date    AORTIC VALVE REPLACEMENT      CARDIAC DEFIBRILLATOR PLACEMENT      pulse generator    CATARACT EXTRACTION, BILATERAL Bilateral     HIP SURGERY Bilateral     PROSTATE CANCER GENE 3(PCA3) (HISTORICAL)      removed 1998 Urology Dr Blossom Lei,     PROSTATECTOMY         FAMILY HISTORY:  Family History   Problem Relation Age of Onset    Emphysema Mother     Substance Abuse Other     Cerebral aneurysm Brother     Dementia Neg Hx        SOCIAL HISTORY:  Social History     Socioeconomic History    Marital status: /Civil Union     Spouse name: Not on file    Number of children: Not on file    Years of education: Not on file    Highest education level: Not on file   Occupational History    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[address] resource strain: Not on file    Food insecurity     Worry: Not on file     Inability: Not on file   [email or web address] needs     Medical: Not on file     Non-medical: Not on file   Tobacco Use    Smoking status: Never Smoker    Smokeless tobacco: Never Used   Substance and Sexual Activity    Alcohol use: Yes     Comment: social    Drug use: No    Sexual activity: Not Currently     Comment: denied: history of high risk sexual behavior   Lifestyle    Physical activity     Days per week: Not on file     Minutes per session: Not on file    Stress: Not on file   Relationships    Social connections     Talks on phone: Not on file     Gets together: Not on file     Attends [employer] service: Not on file     Active member of club or organization: Not on file     Attends meetings of clubs or organizations: Not on file     Relationship status: Not on file    Intimate partner violence     Fear of current or ex partner: Not on file     Emotionally abused: Not on file     Physically abused: Not on file     Forced sexual activity: Not on file   Other Topics Concern    Not on file Social History Narrative        2 children 3 step children    Retired-/Material Handling    No teeth    Hobbies-golfing       Review of Systems   Constitutional: Negative for activity change, chills, fatigue and fever  HENT: Negative for congestion  Eyes: Negative for discharge and visual disturbance  Respiratory: Negative for cough, chest tightness and shortness of breath  Cardiovascular: Negative for chest pain, palpitations and leg swelling  Gastrointestinal: Negative for abdominal pain, blood in stool, constipation, diarrhea, nausea and vomiting  Endocrine: Negative for polydipsia, polyphagia and polyuria  Genitourinary: Negative for difficulty urinating  Musculoskeletal: Negative for arthralgias and myalgias  Skin: Negative for rash  Allergic/Immunologic: Negative for immunocompromised state  Neurological: Negative for dizziness, syncope, weakness, light-headedness and headaches  Hematological: Negative for adenopathy  Does not bruise/bleed easily  Psychiatric/Behavioral: Negative for dysphoric mood and sleep disturbance  The patient is not nervous/anxious  Objective:  Vitals:    05/11/21 1017   BP: 118/70   Pulse: 72   Resp: 16   Temp: 97 8 °F (36 6 °C)   SpO2: 96%   Weight: 85 3 kg (188 lb)   Height: 5' 7" (1 702 m)     Body mass index is 29 44 kg/m²  Physical Exam  Vitals signs and nursing note reviewed  Constitutional:       General: He is not in acute distress  Appearance: He is well-developed  HENT:      Head: Normocephalic and atraumatic  Eyes:      Pupils: Pupils are equal, round, and reactive to light  Neck:      Musculoskeletal: Neck supple  Thyroid: No thyromegaly  Vascular: No carotid bruit or JVD  Cardiovascular:      Rate and Rhythm: Normal rate and regular rhythm  Heart sounds: Normal heart sounds  Pulmonary:      Effort: Pulmonary effort is normal  No respiratory distress        Breath sounds: Normal breath sounds  Musculoskeletal:      Right lower le+ Pitting Edema present  Left lower le+ Pitting Edema present  Lymphadenopathy:      Cervical: No cervical adenopathy  Skin:     General: Skin is warm and dry  Findings: No rash  Neurological:      General: No focal deficit present  Mental Status: He is alert and oriented to person, place, and time  Mental status is at baseline     Psychiatric:         Mood and Affect: Mood normal          Behavior: Behavior normal            RESULTS:    Recent Results (from the past 1008 hour(s))   CBC and differential    Collection Time: 21  8:52 AM   Result Value Ref Range    WBC 5 04 4 31 - 10 16 Thousand/uL    RBC 3 50 (L) 3 88 - 5 62 Million/uL    Hemoglobin 11 1 (L) 12 0 - 17 0 g/dL    Hematocrit 32 9 (L) 36 5 - 49 3 %    MCV 94 82 - 98 fL    MCH 31 7 26 8 - 34 3 pg    MCHC 33 7 31 4 - 37 4 g/dL    RDW 13 4 11 6 - 15 1 %    MPV 10 8 8 9 - 12 7 fL    Platelets 706 (L) 355 - 390 Thousands/uL    nRBC 0 /100 WBCs    Neutrophils Relative 62 43 - 75 %    Immat GRANS % 0 0 - 2 %    Lymphocytes Relative 22 14 - 44 %    Monocytes Relative 9 4 - 12 %    Eosinophils Relative 6 0 - 6 %    Basophils Relative 1 0 - 1 %    Neutrophils Absolute 3 11 1 85 - 7 62 Thousands/µL    Immature Grans Absolute 0 01 0 00 - 0 20 Thousand/uL    Lymphocytes Absolute 1 09 0 60 - 4 47 Thousands/µL    Monocytes Absolute 0 46 0 17 - 1 22 Thousand/µL    Eosinophils Absolute 0 31 0 00 - 0 61 Thousand/µL    Basophils Absolute 0 06 0 00 - 0 10 Thousands/µL   Comprehensive metabolic panel    Collection Time: 21  8:52 AM   Result Value Ref Range    Sodium 141 136 - 145 mmol/L    Potassium 4 0 3 5 - 5 3 mmol/L    Chloride 109 (H) 100 - 108 mmol/L    CO2 24 21 - 32 mmol/L    ANION GAP 8 4 - 13 mmol/L    BUN 21 5 - 25 mg/dL    Creatinine 0 88 0 60 - 1 30 mg/dL    Glucose, Fasting 110 (H) 65 - 99 mg/dL    Calcium 8 8 8 3 - 10 1 mg/dL    AST 24 5 - 45 U/L    ALT 26 12 - 78 U/L Alkaline Phosphatase 51 46 - 116 U/L    Total Protein 6 8 6 4 - 8 2 g/dL    Albumin 3 6 3 5 - 5 0 g/dL    Total Bilirubin 0 65 0 20 - 1 00 mg/dL    eGFR 78 ml/min/1 73sq m   Lipid panel    Collection Time: 05/04/21  8:52 AM   Result Value Ref Range    Cholesterol 123 50 - 200 mg/dL    Triglycerides 67 <=150 mg/dL    HDL, Direct 70 >=40 mg/dL    LDL Calculated 40 0 - 100 mg/dL    Non-HDL-Chol (CHOL-HDL) 53 mg/dl   POCT hemoglobin A1c    Collection Time: 05/11/21 11:03 AM   Result Value Ref Range    Hemoglobin A1C 5 9 6 5       This note was created with voice recognition software  Phonic, grammatical and spelling errors may be present within the note as a result

## 2021-05-11 NOTE — PATIENT INSTRUCTIONS
No change in current medications  Will schedule follow-up in 4 months, make that the Medicare annual wellness visit  Labs to do prior to that visit  Follow-up sooner as needed

## 2021-08-28 ENCOUNTER — NURSE TRIAGE (OUTPATIENT)
Dept: OTHER | Facility: OTHER | Age: 86
End: 2021-08-28

## 2021-08-28 NOTE — TELEPHONE ENCOUNTER
Reason for Disposition   SEVERE diarrhea (e g , 7 or more times / day more than normal)    Answer Assessment - Initial Assessment Questions  1  DIARRHEA SEVERITY: "How bad is the diarrhea?" "How many extra stools have you had in the past 24 hours than normal?"     - NO DIARRHEA (SCALE 0)    - MILD (SCALE 1-3): Few loose or mushy BMs; increase of 1-3 stools over normal daily number of stools; mild increase in ostomy output  -  MODERATE (SCALE 4-7): Increase of 4-6 stools daily over normal; moderate increase in ostomy output  * SEVERE (SCALE 8-10; OR 'WORST POSSIBLE'): Increase of 7 or more stools daily over normal; moderate increase in ostomy output; incontinence  Pt states 8-10 times    2  ONSET: "When did the diarrhea begin?"     0330 on 8/28    3  BM CONSISTENCY: "How loose or watery is the diarrhea?"     Watery    4  VOMITING: "Are you also vomiting?" If Yes, ask: "How many times in the past 24 hours?"     Denies    5  ABDOMINAL PAIN: "Are you having any abdominal pain?" If Yes, ask: "What does it feel like?" (e g , crampy, dull, intermittent, constant)       *No Answer*  6  ABDOMINAL PAIN SEVERITY: If present, ask: "How bad is the pain?"  (e g , Scale 1-10; mild, moderate, or severe)    - MILD (1-3): doesn't interfere with normal activities, abdomen soft and not tender to touch     - MODERATE (4-7): interferes with normal activities or awakens from sleep, tender to touch     - SEVERE (8-10): excruciating pain, doubled over, unable to do any normal activities      Denies    7  ORAL INTAKE: If vomiting, "Have you been able to drink liquids?" "How much fluids have you had in the past 24 hours?"  n/a    8  HYDRATION: "Any signs of dehydration?" (e g , dry mouth [not just dry lips], too weak to stand, dizziness, new weight loss) "When did you last urinate?"    Denies  Last urinated at 1000    9   EXPOSURE: "Have you traveled to a foreign country recently?" "Have you been exposed to anyone with diarrhea?" "Could you have eaten any food that was spoiled?"      Denies    10  ANTIBIOTIC USE: "Are you taking antibiotics now or have you taken antibiotics in the past 2 months?"      Denies    11   OTHER SYMPTOMS: "Do you have any other symptoms?" (e g , fever, blood in stool)     Denies    Protocols used: DIARRHEA-ADULT-

## 2021-08-28 NOTE — TELEPHONE ENCOUNTER
Regarding: Diarrhea  ----- Message from Avel Lopez sent at 8/28/2021 10:06 AM EDT -----  "I soiled myself last evening, and I have been frequenting the bathroom ever since  I took some milk of magnesia, and it has subsided    I was afraid to take my morning medications "

## 2021-09-09 ENCOUNTER — APPOINTMENT (OUTPATIENT)
Dept: LAB | Facility: CLINIC | Age: 86
End: 2021-09-09
Payer: MEDICARE

## 2021-09-09 DIAGNOSIS — I10 ESSENTIAL HYPERTENSION: ICD-10-CM

## 2021-09-09 DIAGNOSIS — D50.9 IRON DEFICIENCY ANEMIA, UNSPECIFIED IRON DEFICIENCY ANEMIA TYPE: ICD-10-CM

## 2021-09-09 DIAGNOSIS — D69.6 THROMBOCYTOPENIA (HCC): ICD-10-CM

## 2021-09-09 LAB
ALBUMIN SERPL BCP-MCNC: 3.4 G/DL (ref 3.5–5)
ALP SERPL-CCNC: 48 U/L (ref 46–116)
ALT SERPL W P-5'-P-CCNC: 35 U/L (ref 12–78)
ANION GAP SERPL CALCULATED.3IONS-SCNC: 1 MMOL/L (ref 4–13)
AST SERPL W P-5'-P-CCNC: 27 U/L (ref 5–45)
BASOPHILS # BLD AUTO: 0.07 THOUSANDS/ΜL (ref 0–0.1)
BASOPHILS NFR BLD AUTO: 1 % (ref 0–1)
BILIRUB SERPL-MCNC: 0.46 MG/DL (ref 0.2–1)
BUN SERPL-MCNC: 20 MG/DL (ref 5–25)
CALCIUM ALBUM COR SERPL-MCNC: 9.2 MG/DL (ref 8.3–10.1)
CALCIUM SERPL-MCNC: 8.7 MG/DL (ref 8.3–10.1)
CHLORIDE SERPL-SCNC: 109 MMOL/L (ref 100–108)
CO2 SERPL-SCNC: 29 MMOL/L (ref 21–32)
CREAT SERPL-MCNC: 0.96 MG/DL (ref 0.6–1.3)
EOSINOPHIL # BLD AUTO: 0.21 THOUSAND/ΜL (ref 0–0.61)
EOSINOPHIL NFR BLD AUTO: 3 % (ref 0–6)
ERYTHROCYTE [DISTWIDTH] IN BLOOD BY AUTOMATED COUNT: 13.2 % (ref 11.6–15.1)
GFR SERPL CREATININE-BSD FRML MDRD: 71 ML/MIN/1.73SQ M
GLUCOSE P FAST SERPL-MCNC: 96 MG/DL (ref 65–99)
HCT VFR BLD AUTO: 32.6 % (ref 36.5–49.3)
HGB BLD-MCNC: 10.7 G/DL (ref 12–17)
IMM GRANULOCYTES # BLD AUTO: 0.02 THOUSAND/UL (ref 0–0.2)
IMM GRANULOCYTES NFR BLD AUTO: 0 % (ref 0–2)
LYMPHOCYTES # BLD AUTO: 1.86 THOUSANDS/ΜL (ref 0.6–4.47)
LYMPHOCYTES NFR BLD AUTO: 27 % (ref 14–44)
MCH RBC QN AUTO: 31.1 PG (ref 26.8–34.3)
MCHC RBC AUTO-ENTMCNC: 32.8 G/DL (ref 31.4–37.4)
MCV RBC AUTO: 95 FL (ref 82–98)
MONOCYTES # BLD AUTO: 0.6 THOUSAND/ΜL (ref 0.17–1.22)
MONOCYTES NFR BLD AUTO: 9 % (ref 4–12)
NEUTROPHILS # BLD AUTO: 4.06 THOUSANDS/ΜL (ref 1.85–7.62)
NEUTS SEG NFR BLD AUTO: 60 % (ref 43–75)
NRBC BLD AUTO-RTO: 0 /100 WBCS
PLATELET # BLD AUTO: 137 THOUSANDS/UL (ref 149–390)
PMV BLD AUTO: 10.7 FL (ref 8.9–12.7)
POTASSIUM SERPL-SCNC: 4.2 MMOL/L (ref 3.5–5.3)
PROT SERPL-MCNC: 6.8 G/DL (ref 6.4–8.2)
RBC # BLD AUTO: 3.44 MILLION/UL (ref 3.88–5.62)
SODIUM SERPL-SCNC: 139 MMOL/L (ref 136–145)
WBC # BLD AUTO: 6.82 THOUSAND/UL (ref 4.31–10.16)

## 2021-09-09 PROCEDURE — 85025 COMPLETE CBC W/AUTO DIFF WBC: CPT

## 2021-09-09 PROCEDURE — 36415 COLL VENOUS BLD VENIPUNCTURE: CPT

## 2021-09-09 PROCEDURE — 80053 COMPREHEN METABOLIC PANEL: CPT

## 2021-09-14 ENCOUNTER — OFFICE VISIT (OUTPATIENT)
Dept: INTERNAL MEDICINE CLINIC | Facility: CLINIC | Age: 86
End: 2021-09-14
Payer: MEDICARE

## 2021-09-14 VITALS
DIASTOLIC BLOOD PRESSURE: 50 MMHG | HEIGHT: 67 IN | HEART RATE: 78 BPM | OXYGEN SATURATION: 97 % | BODY MASS INDEX: 29.51 KG/M2 | WEIGHT: 188 LBS | SYSTOLIC BLOOD PRESSURE: 116 MMHG | TEMPERATURE: 97.4 F

## 2021-09-14 DIAGNOSIS — E78.2 MIXED HYPERLIPIDEMIA: ICD-10-CM

## 2021-09-14 DIAGNOSIS — I48.0 PAF (PAROXYSMAL ATRIAL FIBRILLATION) (HCC): ICD-10-CM

## 2021-09-14 DIAGNOSIS — M81.0 OSTEOPOROSIS, UNSPECIFIED OSTEOPOROSIS TYPE, UNSPECIFIED PATHOLOGICAL FRACTURE PRESENCE: ICD-10-CM

## 2021-09-14 DIAGNOSIS — D50.9 IRON DEFICIENCY ANEMIA, UNSPECIFIED IRON DEFICIENCY ANEMIA TYPE: ICD-10-CM

## 2021-09-14 DIAGNOSIS — I10 ESSENTIAL HYPERTENSION: ICD-10-CM

## 2021-09-14 DIAGNOSIS — Z00.00 ENCOUNTER FOR MEDICARE ANNUAL WELLNESS EXAM: Primary | ICD-10-CM

## 2021-09-14 DIAGNOSIS — D69.6 THROMBOCYTOPENIA (HCC): ICD-10-CM

## 2021-09-14 PROCEDURE — 99214 OFFICE O/P EST MOD 30 MIN: CPT | Performed by: PHYSICIAN ASSISTANT

## 2021-09-14 PROCEDURE — G0439 PPPS, SUBSEQ VISIT: HCPCS | Performed by: PHYSICIAN ASSISTANT

## 2021-09-14 PROCEDURE — 1123F ACP DISCUSS/DSCN MKR DOCD: CPT | Performed by: PHYSICIAN ASSISTANT

## 2021-09-14 NOTE — PROGRESS NOTES
Assessment/Plan:    No change in current medications  Will recheck labs with follow-up visit in 4 months, sooner as needed  Patient also due for his repeat bone density to re-evaluate osteoporosis  Will call with the results of that when available  Quality Measures:   Depression Screening and Follow-up Plan:   Patient was screened for depression during today's encounter  They screened negative with a PHQ-2 score of 0  Return in about 4 months (around 1/14/2022) for Next scheduled follow up-Pt preference  Diagnoses and all orders for this visit:    Encounter for Medicare annual wellness exam    Essential hypertension  -     CBC and differential; Future  -     Comprehensive metabolic panel; Future    PAF (paroxysmal atrial fibrillation) (HCC)    Mixed hyperlipidemia  -     Lipid panel; Future    Iron deficiency anemia, unspecified iron deficiency anemia type  -     Iron Panel (Includes Ferritin, Iron Sat%, Iron, and TIBC); Future    Thrombocytopenia (HCC)  -     CBC and differential; Future    Osteoporosis, unspecified osteoporosis type, unspecified pathological fracture presence  -     DXA bone density spine hip and pelvis; Future          Subjective:      Patient ID: Anthony Willis is a 80 y o  male  Follow-up,  Labs reviewed with patient    Hypertension:  Good control with current medication  Estefani cp, palp, sob, edema, HA, dizziness, diaphoresis, syncope, visual disturbance  Hyperlipidemia:  Previous lab showed excellent control  Denies difficulty with medication  Continue current medication  Paroxysmal atrial fibrillation:  On Eliquis  No complaint of palpitations or tachycardia  Iron deficiency anemia:  On replacement iron  H&H stable  Thrombocytopenia:  Labs stable  No excessive bruising  Osteoporosis on previous bone density done 2 years ago  No history of fractures  Repeat bone density  Patient remains active  Plays golf several times a week    Offers no focal complaints today        ALLERGIES:  Allergies   Allergen Reactions    Simvastatin Allergic Rhinitis       CURRENT MEDICATIONS:    Current Outpatient Medications:     aspirin 81 MG tablet, Take 81 mg by mouth 3 (three) times a week , Disp: , Rfl:     carvedilol (COREG) 12 5 mg tablet, Take 18 75 mg by mouth, Disp: , Rfl:     cyanocobalamin 100 MCG tablet, Take 2,000 mcg by mouth, Disp: , Rfl:     ELIQUIS 5 MG, Take 5 mg by mouth 2 (two) times a day, Disp: , Rfl: 1    ferrous sulfate 325 (65 Fe) mg tablet, Take 325 mg by mouth daily with breakfast, Disp: , Rfl:     furosemide (LASIX) 20 mg tablet, Take 40 mg by mouth daily , Disp: , Rfl:     Iron-Vitamin C (IRON 100/C PO), Take by mouth, Disp: , Rfl:     leuprolide (ELIGARD) 45 MG injection, Inject 45 mg under the skin, Disp: , Rfl:     lifitegrast (XIIDRA) 5 % op solution, Apply 1 drop to eye, Disp: , Rfl:     lisinopril (ZESTRIL) 5 mg tablet, Take 5 mg by mouth, Disp: , Rfl:     Multiple Vitamin (MULTIVITAMIN PO), Take by mouth, Disp: , Rfl:     Omega-3 Fatty Acids (FISH OIL PO), Take 2,400 mg by mouth, Disp: , Rfl:     potassium chloride (KLOR-CON) 20 mEq packet, Take 20 mEq by mouth daily , Disp: , Rfl:     rosuvastatin (CRESTOR) 20 MG tablet, TAKE 1 TABLET DAILY, Disp: 90 tablet, Rfl: 3    ACTIVE PROBLEM LIST:  Patient Active Problem List   Diagnosis    [facility]    Essential hypertension    H/O aortic valve replacement with tissue graft    Mixed hyperlipidemia    Impaired fasting glucose    Iron deficiency anemia    Coronary artery disease involving native coronary artery of native heart without angina pectoris    PAF (paroxysmal atrial fibrillation) (HCC)    Transient amnesia    Thrombocytopenia (HCC)    Osteoporosis       PAST MEDICAL HISTORY:  Past Medical History:   Diagnosis Date    Arthritis     HIP    Cancer of prostate ([facility] ) 1998    removed in [facility]    Heart attack [facility] )     High blood pressure     High cholesterol     Irregular heart beat     has ICD Pacemaker and Medication       PAST SURGICAL HISTORY:  Past Surgical History:   Procedure Laterality Date    AORTIC VALVE REPLACEMENT      CARDIAC DEFIBRILLATOR PLACEMENT      pulse generator    CATARACT EXTRACTION, BILATERAL Bilateral     HIP SURGERY Bilateral     PROSTATE CANCER GENE 3(PCA3) (HISTORICAL)      removed 1998 Urology Dr Eduardo Downey,     PROSTATECTOMY         FAMILY HISTORY:  Family History   Problem Relation Age of Onset    Emphysema Mother     Substance Abuse Other     Cerebral aneurysm Brother     Dementia Neg Hx        SOCIAL HISTORY:  Social History     Socioeconomic History    Marital status: /Civil Union     Spouse name: Not on file    Number of children: Not on file    Years of education: Not on file    Highest education level: Not on file   Occupational History    Occupation: retired    Tobacco Use    Smoking status: Never Smoker    Smokeless tobacco: Never Used   Vaping Use    Vaping Use: Never used   Substance and Sexual Activity    Alcohol use: Yes     Comment: social    Drug use: No    Sexual activity: Not Currently     Comment: denied: history of high risk sexual behavior   Other Topics Concern    Not on file   Social History Narrative        2 children 3 step children    Retired-/Material Handling    No teeth    Hobbies-golfing     Social Determinants of Health     Financial Resource Strain:     Difficulty of Paying Living Expenses:    Food Insecurity:     Worried About Running Out of Food in the Last Year:     [address] in the Last Year:    Transportation Needs:     Lack of Transportation (Medical):      Lack of Transportation (Non-Medical):    Physical Activity:     Days of Exercise per Week:     Minutes of Exercise per Session:    Stress:     Feeling of Stress :    Social Connections:     Frequency of Communication with Friends and Family:     Frequency of Social Gatherings with Friends and Family:     Attends Temple Services:     Active Member of Clubs or Organizations:     Attends Club or Organization Meetings:     Marital Status:    Intimate Partner Violence:     Fear of Current or Ex-Partner:     Emotionally Abused:     Physically Abused:     Sexually Abused:        Review of Systems   Constitutional: Negative for activity change, chills, fatigue and fever  HENT: Negative for congestion  Eyes: Negative for discharge  Respiratory: Negative for cough, chest tightness and shortness of breath  Cardiovascular: Negative for chest pain, palpitations and leg swelling  Gastrointestinal: Negative for abdominal pain, blood in stool, constipation, diarrhea, nausea and vomiting  Endocrine: Negative for polydipsia, polyphagia and polyuria  Genitourinary: Negative for difficulty urinating  Musculoskeletal: Positive for arthralgias  Negative for myalgias  Skin: Negative for rash  Allergic/Immunologic: Negative for immunocompromised state  Neurological: Negative for dizziness, syncope, weakness, light-headedness and headaches  Hematological: Negative for adenopathy  Does not bruise/bleed easily  Psychiatric/Behavioral: Negative for dysphoric mood, sleep disturbance and suicidal ideas  The patient is not nervous/anxious  Objective:  Vitals:    09/14/21 1113   BP: 116/50   Pulse: 78   Temp: (!) 97 4 °F (36 3 °C)   SpO2: 97%   Weight: 85 3 kg (188 lb)   Height: 5' 7" (1 702 m)     Body mass index is 29 44 kg/m²  Physical Exam  Vitals and nursing note reviewed  Constitutional:       General: He is not in acute distress  Appearance: Normal appearance  He is well-developed  He is not ill-appearing  HENT:      Head: Normocephalic        Right Ear: Ear canal and external ear normal       Left Ear: Ear canal and external ear normal       Ears:      Comments: TMs not evaluated secondary to patient wearing hearing aids     Nose:      Comments: Septal deviation to the left with mild blockage to inhalation left naris     Mouth/Throat:      Mouth: Mucous membranes are moist       Pharynx: Oropharynx is clear  No oropharyngeal exudate  Eyes:      General: No scleral icterus  Right eye: No discharge  Left eye: No discharge  Extraocular Movements: Extraocular movements intact  Conjunctiva/sclera: Conjunctivae normal       Comments: Right pupil constricted compared to left, bilateral partial arcus senilis   Neck:      Thyroid: No thyromegaly  Vascular: No carotid bruit or JVD  Trachea: No tracheal deviation  Cardiovascular:      Rate and Rhythm: Normal rate and regular rhythm  Pulses: Normal pulses  Heart sounds: Normal heart sounds  No murmur heard  No friction rub  No gallop  Pulmonary:      Effort: Pulmonary effort is normal  No respiratory distress  Breath sounds: Normal breath sounds  No wheezing or rales  Chest:      Chest wall: No tenderness  Abdominal:      General: Bowel sounds are normal  There is no distension  Palpations: Abdomen is soft  There is no hepatomegaly, splenomegaly or mass  Tenderness: There is no abdominal tenderness  There is no right CVA tenderness, left CVA tenderness, guarding or rebound  Genitourinary:     Comments: Deferred to patient's urologist  Musculoskeletal:         General: No tenderness or deformity  Normal range of motion  Cervical back: Normal range of motion and neck supple  Right lower leg: No edema  Left lower leg: No edema  Lymphadenopathy:      Cervical: No cervical adenopathy  Skin:     General: Skin is warm and dry  Findings: No rash  Neurological:      General: No focal deficit present  Mental Status: He is alert and oriented to person, place, and time  Cranial Nerves: No cranial nerve deficit  Sensory: No sensory deficit        Motor: No weakness or abnormal muscle tone  Coordination: Coordination normal       Gait: Gait normal       Deep Tendon Reflexes: Reflexes are normal and symmetric  Reflexes normal    Psychiatric:         Mood and Affect: Mood normal          Behavior: Behavior normal          Thought Content:  Thought content normal          Judgment: Judgment normal            RESULTS:    Recent Results (from the past 1008 hour(s))   CBC and differential    Collection Time: 09/09/21  7:51 AM   Result Value Ref Range    WBC 6 82 4 31 - 10 16 Thousand/uL    RBC 3 44 (L) 3 88 - 5 62 Million/uL    Hemoglobin 10 7 (L) 12 0 - 17 0 g/dL    Hematocrit 32 6 (L) 36 5 - 49 3 %    MCV 95 82 - 98 fL    MCH 31 1 26 8 - 34 3 pg    MCHC 32 8 31 4 - 37 4 g/dL    RDW 13 2 11 6 - 15 1 %    MPV 10 7 8 9 - 12 7 fL    Platelets 069 (L) 046 - 390 Thousands/uL    nRBC 0 /100 WBCs    Neutrophils Relative 60 43 - 75 %    Immat GRANS % 0 0 - 2 %    Lymphocytes Relative 27 14 - 44 %    Monocytes Relative 9 4 - 12 %    Eosinophils Relative 3 0 - 6 %    Basophils Relative 1 0 - 1 %    Neutrophils Absolute 4 06 1 85 - 7 62 Thousands/µL    Immature Grans Absolute 0 02 0 00 - 0 20 Thousand/uL    Lymphocytes Absolute 1 86 0 60 - 4 47 Thousands/µL    Monocytes Absolute 0 60 0 17 - 1 22 Thousand/µL    Eosinophils Absolute 0 21 0 00 - 0 61 Thousand/µL    Basophils Absolute 0 07 0 00 - 0 10 Thousands/µL   Comprehensive metabolic panel    Collection Time: 09/09/21  7:51 AM   Result Value Ref Range    Sodium 139 136 - 145 mmol/L    Potassium 4 2 3 5 - 5 3 mmol/L    Chloride 109 (H) 100 - 108 mmol/L    CO2 29 21 - 32 mmol/L    ANION GAP 1 (L) 4 - 13 mmol/L    BUN 20 5 - 25 mg/dL    Creatinine 0 96 0 60 - 1 30 mg/dL    Glucose, Fasting 96 65 - 99 mg/dL    Calcium 8 7 8 3 - 10 1 mg/dL    Corrected Calcium 9 2 8 3 - 10 1 mg/dL    AST 27 5 - 45 U/L    ALT 35 12 - 78 U/L    Alkaline Phosphatase 48 46 - 116 U/L    Total Protein 6 8 6 4 - 8 2 g/dL    Albumin 3 4 (L) 3 5 - 5 0 g/dL    Total Bilirubin 0 46 0 20 - 1 00 mg/dL    eGFR 71 ml/min/1 73sq m       This note was created with voice recognition software  Phonic, grammatical and spelling errors may be present within the note as a result

## 2021-09-14 NOTE — PATIENT INSTRUCTIONS
No change in current medications  Will recheck labs with follow-up visit in 4 months, sooner as needed  Patient also due for his repeat bone density to re-evaluate osteoporosis  Will call with the results of that when available  Medicare Preventive Visit Patient Instructions  Thank you for completing your Welcome to Medicare Visit or Medicare Annual Wellness Visit today  Your next wellness visit will be due in one year (9/15/2022)  The screening/preventive services that you may require over the next 5-10 years are detailed below  Some tests may not apply to you based off risk factors and/or age  Screening tests ordered at today's visit but not completed yet may show as past due  Also, please note that scanned in results may not display below  Preventive Screenings:  Service Recommendations Previous Testing/Comments   Colorectal Cancer Screening  · Colonoscopy    · Fecal Occult Blood Test (FOBT)/Fecal Immunochemical Test (FIT)  · Fecal DNA/Cologuard Test  · Flexible Sigmoidoscopy Age: 54-65 years old   Colonoscopy: every 10 years (May be performed more frequently if at higher risk)  OR  FOBT/FIT: every 1 year  OR  Cologuard: every 3 years  OR  Sigmoidoscopy: every 5 years  Screening may be recommended earlier than age 48 if at higher risk for colorectal cancer  Also, an individualized decision between you and your healthcare provider will decide whether screening between the ages of 74-80 would be appropriate   Colonoscopy: 04/25/2008  FOBT/FIT: Not on file  Cologuard: Not on file  Sigmoidoscopy: Not on file    Screening Not Indicated     Prostate Cancer Screening Individualized decision between patient and health care provider in men between ages of 53-78   Medicare will cover every 12 months beginning on the day after your 50th birthday PSA: <0 1 ng/mL     History Prostate Cancer  Screening Not Indicated  Screening Current     Hepatitis C Screening Once for adults born between 1945 and 1965  More frequently in patients at high risk for Hepatitis C Hep C Antibody: Not on file    Screening Not Indicated   Diabetes Screening 1-2 times per year if you're at risk for diabetes or have pre-diabetes Fasting glucose: 96 mg/dL   A1C: 5 9    Screening Current   Cholesterol Screening Once every 5 years if you don't have a lipid disorder  May order more often based on risk factors  Lipid panel: 05/04/2021    Screening Not Indicated  History Lipid Disorder  Screening Current      Other Preventive Screenings Covered by Medicare:  1  Abdominal Aortic Aneurysm (AAA) Screening: covered once if your at risk  You're considered to be at risk if you have a family history of AAA or a male between the age of 73-68 who smoking at least 100 cigarettes in your lifetime  2  Lung Cancer Screening: covers low dose CT scan once per year if you meet all of the following conditions: (1) Age 50-69; (2) No signs or symptoms of lung cancer; (3) Current smoker or have quit smoking within the last 15 years; (4) You have a tobacco smoking history of at least 30 pack years (packs per day x number of years you smoked); (5) You get a written order from a healthcare provider  3  Glaucoma Screening: covered annually if you're considered high risk: (1) You have diabetes OR (2) Family history of glaucoma OR (3)  aged 48 and older OR (3)  American aged 72 and older  3  Osteoporosis Screening: covered every 2 years if you meet one of the following conditions: (1) Have a vertebral abnormality; (2) On glucocorticoid therapy for more than 3 months; (3) Have primary hyperparathyroidism; (4) On osteoporosis medications and need to assess response to drug therapy  5  HIV Screening: covered annually if you're between the age of 12-76  Also covered annually if you are younger than 13 and older than 72 with risk factors for HIV infection  For pregnant patients, it is covered up to 3 times per pregnancy      Immunizations:  Immunization Recommendations   Influenza Vaccine Annual influenza vaccination during flu season is recommended for all persons aged >= 6 months who do not have contraindications   Pneumococcal Vaccine (Prevnar and Pneumovax)  * Prevnar = PCV13  * Pneumovax = PPSV23 Adults 25-60 years old: 1-3 doses may be recommended based on certain risk factors  Adults 72 years old: Prevnar (PCV13) vaccine recommended followed by Pneumovax (PPSV23) vaccine  If already received PPSV23 since turning 65, then PCV13 recommended at least one year after PPSV23 dose  Hepatitis B Vaccine 3 dose series if at intermediate or high risk (ex: diabetes, end stage renal disease, liver disease)   Tetanus (Td) Vaccine - COST NOT COVERED BY MEDICARE PART B Following completion of primary series, a booster dose should be given every 10 years to maintain immunity against tetanus  Td may also be given as tetanus wound prophylaxis  Tdap Vaccine - COST NOT COVERED BY MEDICARE PART B Recommended at least once for all adults  For pregnant patients, recommended with each pregnancy  Shingles Vaccine (Shingrix) - COST NOT COVERED BY MEDICARE PART B  2 shot series recommended in those aged 48 and above     Health Maintenance Due:  There are no preventive care reminders to display for this patient  Immunizations Due:      Topic Date Due    Influenza Vaccine (1) 09/01/2021     Advance Directives   What are advance directives? Advance directives are legal documents that state your wishes and plans for medical care  These plans are made ahead of time in case you lose your ability to make decisions for yourself  Advance directives can apply to any medical decision, such as the treatments you want, and if you want to donate organs  What are the types of advance directives? There are many types of advance directives, and each state has rules about how to use them  You may choose a combination of any of the following:  · Living will:   This is a written record of the treatment you want  You can also choose which treatments you do not want, which to limit, and which to stop at a certain time  This includes surgery, medicine, IV fluid, and tube feedings  · Durable power of  for healthcare [facility]): This is a written record that states who you want to make healthcare choices for you when you are unable to make them for yourself  This person, called a proxy, is usually a family member or a friend  You may choose more than 1 proxy  · Do not resuscitate (DNR) order:  A DNR order is used in case your heart stops beating or you stop breathing  It is a request not to have certain forms of treatment, such as CPR  A DNR order may be included in other types of advance directives  · Medical directive: This covers the care that you want if you are in a coma, near death, or unable to make decisions for yourself  You can list the treatments you want for each condition  Treatment may include pain medicine, surgery, blood transfusions, dialysis, IV or tube feedings, and a ventilator (breathing machine)  · Values history: This document has questions about your views, beliefs, and how you feel and think about life  This information can help others choose the care that you would choose  Why are advance directives important? An advance directive helps you control your care  Although spoken wishes may be used, it is better to have your wishes written down  Spoken wishes can be misunderstood, or not followed  Treatments may be given even if you do not want them  An advance directive may make it easier for your family to make difficult choices about your care  Weight Management   Why it is important to manage your weight:  Being overweight increases your risk of health conditions such as heart disease, high blood pressure, type 2 diabetes, and certain types of cancer  It can also increase your risk for osteoarthritis, sleep apnea, and other respiratory problems   Aim for a slow, steady weight loss  Even a small amount of weight loss can lower your risk of health problems  How to lose weight safely:  A safe and healthy way to lose weight is to eat fewer calories and get regular exercise  You can lose up about 1 pound a week by decreasing the number of calories you eat by 500 calories each day  Healthy meal plan for weight management:  A healthy meal plan includes a variety of foods, contains fewer calories, and helps you stay healthy  A healthy meal plan includes the following:  · Eat whole-grain foods more often  A healthy meal plan should contain fiber  Fiber is the part of grains, fruits, and vegetables that is not broken down by your body  Whole-grain foods are healthy and provide extra fiber in your diet  Some examples of whole-grain foods are whole-wheat breads and pastas, oatmeal, brown rice, and bulgur  · Eat a variety of vegetables every day  Include dark, leafy greens such as spinach, kale, moncho greens, and mustard greens  Eat yellow and orange vegetables such as carrots, sweet potatoes, and winter squash  · Eat a variety of fruits every day  Choose fresh or canned fruit (canned in its own juice or light syrup) instead of juice  Fruit juice has very little or no fiber  · Eat low-fat dairy foods  Drink fat-free (skim) milk or 1% milk  Eat fat-free yogurt and low-fat cottage cheese  Try low-fat cheeses such as mozzarella and other reduced-fat cheeses  · Choose meat and other protein foods that are low in fat  Choose beans or other legumes such as split peas or lentils  Choose fish, skinless poultry (chicken or turkey), or lean cuts of red meat (beef or pork)  Before you cook meat or poultry, cut off any visible fat  · Use less fat and oil  Try baking foods instead of frying them  Add less fat, such as margarine, sour cream, regular salad dressing and mayonnaise to foods  Eat fewer high-fat foods   Some examples of high-fat foods include french fries, doughnuts, ice cream, and cakes  · Eat fewer sweets  Limit foods and drinks that are high in sugar  This includes candy, cookies, regular soda, and sweetened drinks  Exercise:  Exercise at least 30 minutes per day on most days of the week  Some examples of exercise include walking, biking, dancing, and swimming  You can also fit in more physical activity by taking the stairs instead of the elevator or parking farther away from stores  Ask your healthcare provider about the best exercise plan for you  © Copyright [employer] 2018 Information is for End User's use only and may not be sold, redistributed or otherwise used for commercial purposes   All illustrations and images included in CareNotes® are the copyrighted property of [employer]  or [address] [place] [address]

## 2021-09-14 NOTE — PROGRESS NOTES
Assessment and Plan:   No change in current medications  Will recheck labs with follow-up visit in 4 months, sooner as needed  Patient also due for his repeat bone density to re-evaluate osteoporosis  Will call with the results of that when available  Problem List Items Addressed This Visit        Cardiovascular and Mediastinum    Essential hypertension    PAF (paroxysmal atrial fibrillation) ([facility] )       Other    Mixed hyperlipidemia    Iron deficiency anemia    Thrombocytopenia ([facility] )      Other Visit Diagnoses     Encounter for Medicare annual wellness exam    -  Primary          Depression Screening and Follow-up Plan:   Patient was screened for depression during today's encounter  They screened negative with a PHQ-2 score of 0  Preventive health issues were discussed with patient, and age appropriate screening tests were ordered as noted in patient's After Visit Summary  Personalized health advice and appropriate referrals for health education or preventive services given if needed, as noted in patient's After Visit Summary  History of Present Illness:     Patient presents for Medicare Annual Wellness visit   Questionnaire has been reviewed, clarified, and updated with the patient today      Patient Care Team:  Mihir Garcia MD as PCP - General     Problem List:     Patient Active Problem List   Diagnosis    [facility] systolic [facility] failure ([facility] )    Essential hypertension    H/O aortic valve replacement with tissue graft    Mixed hyperlipidemia    Impaired fasting glucose    Iron deficiency anemia    Coronary artery disease involving native coronary artery of native heart without angina pectoris    PAF (paroxysmal atrial fibrillation) (HCC)    Transient amnesia    Thrombocytopenia ([facility] )      Past Medical and Surgical History:     Past Medical History:   Diagnosis Date    Arthritis     HIP    Cancer of prostate ([facility] ) 1998    removed in [place]    Heart attack [facility])  High blood pressure     High cholesterol     Irregular heart beat     has ICD Pacemaker and Medication     Past Surgical History:   Procedure Laterality Date    AORTIC VALVE REPLACEMENT      CARDIAC DEFIBRILLATOR PLACEMENT      pulse generator    CATARACT EXTRACTION, BILATERAL Bilateral     HIP SURGERY Bilateral     PROSTATE CANCER GENE 3(PCA3) (HISTORICAL)      removed 1998 Urology Dr Ciro Aguilar,     PROSTATECTOMY        Family History:     Family History   Problem Relation Age of Onset    Emphysema Mother     Substance Abuse Other     Cerebral aneurysm Brother     Dementia Neg Hx       Social History:     Social History     Socioeconomic History    Marital status: /Civil Union     Spouse name: Not on file    Number of children: Not on file    Years of education: Not on file    Highest education level: Not on file   Occupational History    Occupation: retired    Tobacco Use    Smoking status: Never Smoker    Smokeless tobacco: Never Used   Vaping Use    Vaping Use: Never used   Substance and Sexual Activity    Alcohol use: Yes     Comment: social    Drug use: No    Sexual activity: Not Currently     Comment: denied: history of high risk sexual behavior   Other Topics Concern    Not on file   Social History Narrative        2 children 3 step children    Retired-/Material Handling    No teeth    Hobbies-golfing     Social Determinants of Health     Financial Resource Strain:     Difficulty of Paying Living Expenses:    Food Insecurity:     Worried About Running Out of Food in the Last Year:     [address] in the Last Year:    Transportation Needs:     Lack of Transportation (Medical):      Lack of Transportation (Non-Medical):    Physical Activity:     Days of Exercise per Week:     Minutes of Exercise per Session:    Stress:     Feeling of Stress :    Social Connections:     Frequency of Communication with Friends and Family:     Frequency of Social Gatherings with Friends and Family:     Attends Synagogue Services:     Active Member of Clubs or Organizations:     Attends Club or Organization Meetings:     Marital Status:    Intimate Partner Violence:     Fear of Current or Ex-Partner:     Emotionally Abused:     Physically Abused:     Sexually Abused:       Medications and Allergies:     Current Outpatient Medications   Medication Sig Dispense Refill    aspirin 81 MG tablet Take 81 mg by mouth 3 (three) times a week       carvedilol (COREG) 12 5 mg tablet Take 18 75 mg by mouth      cyanocobalamin 100 MCG tablet Take 2,000 mcg by mouth      ELIQUIS 5 MG Take 5 mg by mouth 2 (two) times a day  1    ferrous sulfate 325 (65 Fe) mg tablet Take 325 mg by mouth daily with breakfast      furosemide (LASIX) 20 mg tablet Take 40 mg by mouth daily       Iron-Vitamin C (IRON 100/C PO) Take by mouth      leuprolide (ELIGARD) 45 MG injection Inject 45 mg under the skin      lifitegrast (XIIDRA) 5 % op solution Apply 1 drop to eye      lisinopril (ZESTRIL) 5 mg tablet Take 5 mg by mouth      Multiple Vitamin (MULTIVITAMIN PO) Take by mouth      Omega-3 Fatty Acids (FISH OIL PO) Take 2,400 mg by mouth      potassium chloride (KLOR-CON) 20 mEq packet Take 20 mEq by mouth daily       rosuvastatin (CRESTOR) 20 MG tablet TAKE 1 TABLET DAILY 90 tablet 3     No current facility-administered medications for this visit       Allergies   Allergen Reactions    Simvastatin Allergic Rhinitis      Immunizations:     Immunization History   Administered Date(s) Administered    Influenza Split 01/14/2013, 09/23/2013    Influenza Split High Dose Preservative Free IM 11/25/2015, 09/06/2016, 09/11/2017    Influenza, high dose seasonal 0 7 mL 10/29/2018, 10/01/2019, 10/20/2020    Influenza, seasonal, injectable 11/08/2010, 12/10/2014    Influenza, seasonal, injectable, preservative free 10/19/2009, 11/21/2011    Pneumococcal Conjugate 13-Valent 09/06/2016    Pneumococcal Polysaccharide PPV23 05/10/2004, 05/29/2019    SARS-CoV-2 / COVID-19 mRNA IM (Pfizer-BioNTech) 03/20/2021, 04/10/2021    Tdap 05/04/2011, 01/01/2016      Health Maintenance: There are no preventive care reminders to display for this patient  Topic Date Due    Influenza Vaccine (1) 09/01/2021      Medicare Health Risk Assessment:     /50   Pulse 78   Temp (!) 97 4 °F (36 3 °C)   Ht 5' 7" (1 702 m)   Wt 85 3 kg (188 lb)   SpO2 97%   BMI 29 44 kg/m²      Stefano Andersen is here for his Subsequent Wellness visit  Health Risk Assessment:   Patient rates overall health as good  Patient feels that their physical health rating is same  Patient is satisfied with their life  Eyesight was rated as same  Hearing was rated as much worse  Patient feels that their emotional and mental health rating is much better  Patients states they are never, rarely angry  Patient states they are sometimes unusually tired/fatigued  Pain experienced in the last 7 days has been none  Patient states that he has experienced no weight loss or gain in last 6 months  Depression Screening:   PHQ-2 Score: 0      Fall Risk Screening: In the past year, patient has experienced: no history of falling in past year      Home Safety:  Patient does not have trouble with stairs inside or outside of their home  Patient has working smoke alarms and has working carbon monoxide detector  Home safety hazards include: none  Nutrition:   Current diet is Low Carb and Low Cholesterol  Medications:   Patient is not currently taking any over-the-counter supplements  Patient is able to manage medications  Activities of Daily Living (ADLs)/Instrumental Activities of Daily Living (IADLs):   Walk and transfer into and out of bed and chair?: Yes  Dress and groom yourself?: Yes    Bathe or shower yourself?: Yes    Feed yourself?  Yes  Do your laundry/housekeeping?: Yes  Manage your money, pay your bills and track your expenses?: Yes  Make your own meals?: Yes    Do your own shopping?: Yes    Previous Hospitalizations:   Any hospitalizations or ED visits within the last 12 months?: No      Advance Care Planning:   Living will: Yes    Durable POA for healthcare: Yes    Advanced directive: Yes      Comments: Pt will bring copy to office    Cognitive Screening:   Provider or family/friend/caregiver concerned regarding cognition?: No    PREVENTIVE SCREENINGS      Cardiovascular Screening:    General: Screening Not Indicated, History Lipid Disorder and Screening Current      Diabetes Screening:     General: Screening Current      Colorectal Cancer Screening:     General: Screening Not Indicated      Prostate Cancer Screening:    General: History Prostate Cancer, Screening Not Indicated and Screening Current      Osteoporosis Screening:    General: Screening Not Indicated and History Osteoporosis    Due for: Bone Density Ultrasound      Abdominal Aortic Aneurysm (AAA) Screening:        General: Screening Not Indicated      Lung Cancer Screening:     General: Screening Not Indicated      Hepatitis C Screening:    General: Screening Not Indicated    Screening, Brief Intervention, and Referral to Treatment (SBIRT)    Screening  Typical number of drinks in a day: 0  Typical number of drinks in a week: 0  Interpretation: Low risk drinking behavior  Brief Intervention  Alcohol & drug use screenings were reviewed  No concerns regarding substance use disorder identified         Tommy Muñoz PA-C

## 2021-11-05 ENCOUNTER — CLINICAL SUPPORT (OUTPATIENT)
Dept: INTERNAL MEDICINE CLINIC | Facility: CLINIC | Age: 86
End: 2021-11-05
Payer: MEDICARE

## 2021-11-05 DIAGNOSIS — Z23 FLU VACCINE NEED: Primary | ICD-10-CM

## 2021-11-05 PROCEDURE — G0008 ADMIN INFLUENZA VIRUS VAC: HCPCS

## 2021-11-05 PROCEDURE — 90662 IIV NO PRSV INCREASED AG IM: CPT

## 2021-11-10 ENCOUNTER — TELEPHONE (OUTPATIENT)
Dept: INTERNAL MEDICINE CLINIC | Facility: CLINIC | Age: 86
End: 2021-11-10

## 2021-11-19 ENCOUNTER — IMMUNIZATIONS (OUTPATIENT)
Dept: FAMILY MEDICINE CLINIC | Facility: HOSPITAL | Age: 86
End: 2021-11-19

## 2021-11-19 DIAGNOSIS — Z23 ENCOUNTER FOR IMMUNIZATION: Primary | ICD-10-CM

## 2021-11-19 PROCEDURE — 91300 COVID-19 PFIZER VACC 0.3 ML: CPT

## 2021-11-19 PROCEDURE — 0001A COVID-19 PFIZER VACC 0.3 ML: CPT

## 2022-01-11 ENCOUNTER — APPOINTMENT (OUTPATIENT)
Dept: LAB | Facility: CLINIC | Age: 87
End: 2022-01-11
Payer: MEDICARE

## 2022-01-11 DIAGNOSIS — D69.6 THROMBOCYTOPENIA (HCC): ICD-10-CM

## 2022-01-11 DIAGNOSIS — E78.2 MIXED HYPERLIPIDEMIA: ICD-10-CM

## 2022-01-11 DIAGNOSIS — D50.9 IRON DEFICIENCY ANEMIA, UNSPECIFIED IRON DEFICIENCY ANEMIA TYPE: ICD-10-CM

## 2022-01-11 DIAGNOSIS — I10 ESSENTIAL HYPERTENSION: ICD-10-CM

## 2022-01-11 LAB
ALBUMIN SERPL BCP-MCNC: 3.7 G/DL (ref 3.5–5)
ALP SERPL-CCNC: 49 U/L (ref 46–116)
ALT SERPL W P-5'-P-CCNC: 26 U/L (ref 12–78)
ANION GAP SERPL CALCULATED.3IONS-SCNC: 5 MMOL/L (ref 4–13)
AST SERPL W P-5'-P-CCNC: 23 U/L (ref 5–45)
BASOPHILS # BLD AUTO: 0.06 THOUSANDS/ΜL (ref 0–0.1)
BASOPHILS NFR BLD AUTO: 1 % (ref 0–1)
BILIRUB SERPL-MCNC: 0.66 MG/DL (ref 0.2–1)
BUN SERPL-MCNC: 18 MG/DL (ref 5–25)
CALCIUM SERPL-MCNC: 9 MG/DL (ref 8.3–10.1)
CHLORIDE SERPL-SCNC: 110 MMOL/L (ref 100–108)
CHOLEST SERPL-MCNC: 149 MG/DL
CO2 SERPL-SCNC: 26 MMOL/L (ref 21–32)
CREAT SERPL-MCNC: 0.84 MG/DL (ref 0.6–1.3)
EOSINOPHIL # BLD AUTO: 0.21 THOUSAND/ΜL (ref 0–0.61)
EOSINOPHIL NFR BLD AUTO: 4 % (ref 0–6)
ERYTHROCYTE [DISTWIDTH] IN BLOOD BY AUTOMATED COUNT: 13.1 % (ref 11.6–15.1)
FERRITIN SERPL-MCNC: 111 NG/ML (ref 8–388)
GFR SERPL CREATININE-BSD FRML MDRD: 78 ML/MIN/1.73SQ M
GLUCOSE P FAST SERPL-MCNC: 101 MG/DL (ref 65–99)
HCT VFR BLD AUTO: 33.4 % (ref 36.5–49.3)
HDLC SERPL-MCNC: 69 MG/DL
HGB BLD-MCNC: 11.1 G/DL (ref 12–17)
IMM GRANULOCYTES # BLD AUTO: 0.02 THOUSAND/UL (ref 0–0.2)
IMM GRANULOCYTES NFR BLD AUTO: 0 % (ref 0–2)
IRON SATN MFR SERPL: 32 % (ref 20–50)
IRON SERPL-MCNC: 107 UG/DL (ref 65–175)
LDLC SERPL CALC-MCNC: 68 MG/DL (ref 0–100)
LYMPHOCYTES # BLD AUTO: 1.55 THOUSANDS/ΜL (ref 0.6–4.47)
LYMPHOCYTES NFR BLD AUTO: 26 % (ref 14–44)
MCH RBC QN AUTO: 30.6 PG (ref 26.8–34.3)
MCHC RBC AUTO-ENTMCNC: 33.2 G/DL (ref 31.4–37.4)
MCV RBC AUTO: 92 FL (ref 82–98)
MONOCYTES # BLD AUTO: 0.7 THOUSAND/ΜL (ref 0.17–1.22)
MONOCYTES NFR BLD AUTO: 12 % (ref 4–12)
NEUTROPHILS # BLD AUTO: 3.46 THOUSANDS/ΜL (ref 1.85–7.62)
NEUTS SEG NFR BLD AUTO: 57 % (ref 43–75)
NONHDLC SERPL-MCNC: 80 MG/DL
NRBC BLD AUTO-RTO: 0 /100 WBCS
PLATELET # BLD AUTO: 122 THOUSANDS/UL (ref 149–390)
PMV BLD AUTO: 11.1 FL (ref 8.9–12.7)
POTASSIUM SERPL-SCNC: 4.1 MMOL/L (ref 3.5–5.3)
PROT SERPL-MCNC: 6.8 G/DL (ref 6.4–8.2)
RBC # BLD AUTO: 3.63 MILLION/UL (ref 3.88–5.62)
SODIUM SERPL-SCNC: 141 MMOL/L (ref 136–145)
TIBC SERPL-MCNC: 336 UG/DL (ref 250–450)
TRIGL SERPL-MCNC: 60 MG/DL
WBC # BLD AUTO: 6 THOUSAND/UL (ref 4.31–10.16)

## 2022-01-11 PROCEDURE — 82728 ASSAY OF FERRITIN: CPT

## 2022-01-11 PROCEDURE — 80053 COMPREHEN METABOLIC PANEL: CPT

## 2022-01-11 PROCEDURE — 80061 LIPID PANEL: CPT

## 2022-01-11 PROCEDURE — 85025 COMPLETE CBC W/AUTO DIFF WBC: CPT

## 2022-01-11 PROCEDURE — 83550 IRON BINDING TEST: CPT

## 2022-01-11 PROCEDURE — 83540 ASSAY OF IRON: CPT

## 2022-01-11 PROCEDURE — 36415 COLL VENOUS BLD VENIPUNCTURE: CPT

## 2022-01-19 ENCOUNTER — OFFICE VISIT (OUTPATIENT)
Dept: INTERNAL MEDICINE CLINIC | Facility: CLINIC | Age: 87
End: 2022-01-19
Payer: MEDICARE

## 2022-01-19 VITALS
SYSTOLIC BLOOD PRESSURE: 130 MMHG | BODY MASS INDEX: 29.91 KG/M2 | HEART RATE: 77 BPM | DIASTOLIC BLOOD PRESSURE: 62 MMHG | HEIGHT: 67 IN | OXYGEN SATURATION: 97 % | TEMPERATURE: 97.1 F | WEIGHT: 190.6 LBS

## 2022-01-19 DIAGNOSIS — E78.2 MIXED HYPERLIPIDEMIA: ICD-10-CM

## 2022-01-19 DIAGNOSIS — D69.6 THROMBOCYTOPENIA (HCC): ICD-10-CM

## 2022-01-19 DIAGNOSIS — R73.01 IMPAIRED FASTING GLUCOSE: ICD-10-CM

## 2022-01-19 DIAGNOSIS — I10 ESSENTIAL HYPERTENSION: Primary | ICD-10-CM

## 2022-01-19 DIAGNOSIS — D50.9 IRON DEFICIENCY ANEMIA, UNSPECIFIED IRON DEFICIENCY ANEMIA TYPE: ICD-10-CM

## 2022-01-19 PROCEDURE — 99214 OFFICE O/P EST MOD 30 MIN: CPT | Performed by: PHYSICIAN ASSISTANT

## 2022-01-19 NOTE — PROGRESS NOTES
Assessment/Plan:   Patient Instructions   No change in medical management  Continue to follow with her specialist   Will schedule follow-up in 4 months with repeat labs for that visit  Follow-up sooner as needed  Quality Measures: BMI Counseling: Body mass index is 29 85 kg/m²  The BMI is above normal  Nutrition recommendations include decreasing portion sizes, encouraging healthy choices of fruits and vegetables, consuming healthier snacks and moderation in carbohydrate intake  Exercise recommendations include exercising 3-5 times per week  Rationale for BMI follow-up plan is due to patient being overweight or obese  Return in about 4 months (around 5/19/2022)  Diagnoses and all orders for this visit:    Essential hypertension  -     Comprehensive metabolic panel; Future    Impaired fasting glucose  -     Hemoglobin A1C; Future    Mixed hyperlipidemia  -     Lipid panel; Future    Iron deficiency anemia, unspecified iron deficiency anemia type  -     CBC and differential; Future    Thrombocytopenia (HCC)          Subjective:      Patient ID: González Vinson is a 80 y o  male  Follow-up, labs reviewed with patient     Hypertension:  Very good control on current medication  Estefani cp, palp, sob, edema, HA, dizziness, diaphoresis, syncope, visual disturbance  Hyperlipidemia:  Labs show very good control  On high-dose rosuvastatin  Tolerating well  Impaired fasting glucose:    Denies polyuria, polyphagia, polydipsia  Iron deficiency anemia:  Stable  Iron studies look good  Thrombocytopenia:  Stable numbers  Denies bruising  Follows with Cardiology and Urology regularly  No new focal concerns today        ALLERGIES:  Allergies   Allergen Reactions    Simvastatin Allergic Rhinitis       CURRENT MEDICATIONS:    Current Outpatient Medications:     carvedilol (COREG) 12 5 mg tablet, Take 18 75 mg by mouth, Disp: , Rfl:     cyanocobalamin 100 MCG tablet, Take 2,000 mcg by mouth, Disp: , Rfl:     ELIQUIS 5 MG, Take 5 mg by mouth 2 (two) times a day, Disp: , Rfl: 1    ferrous sulfate 325 (65 Fe) mg tablet, Take 325 mg by mouth daily with breakfast, Disp: , Rfl:     furosemide (LASIX) 20 mg tablet, Take 40 mg by mouth daily , Disp: , Rfl:     Iron-Vitamin C (IRON 100/C PO), Take by mouth, Disp: , Rfl:     leuprolide (ELIGARD) 45 MG injection, Inject 45 mg under the skin, Disp: , Rfl:     lifitegrast (XIIDRA) 5 % op solution, Apply 1 drop to eye, Disp: , Rfl:     lisinopril (ZESTRIL) 5 mg tablet, Take 5 mg by mouth, Disp: , Rfl:     Multiple Vitamin (MULTIVITAMIN PO), Take by mouth, Disp: , Rfl:     Omega-3 Fatty Acids (FISH OIL PO), Take 2,400 mg by mouth, Disp: , Rfl:     potassium chloride (KLOR-CON) 20 mEq packet, Take 20 mEq by mouth daily , Disp: , Rfl:     rosuvastatin (CRESTOR) 20 MG tablet, TAKE 1 TABLET DAILY, Disp: 90 tablet, Rfl: 3    ACTIVE PROBLEM LIST:  Patient Active Problem List   Diagnosis    Chronic systolic heart failure ([facility] )    Essential hypertension    H/O aortic valve replacement with tissue graft    Mixed hyperlipidemia    Impaired fasting glucose    Iron deficiency anemia    Coronary artery disease involving native coronary artery of native heart without angina pectoris    PAF (paroxysmal atrial fibrillation) (HCC)    Transient amnesia    Thrombocytopenia (HCC)    Osteoporosis       PAST MEDICAL HISTORY:  Past Medical History:   Diagnosis Date    Arthritis     HIP    Cancer of prostate ([facility] ) 1998    removed in [facility] Dr Brianna Larsen    Heart attack ([facility] )     High blood pressure     High cholesterol     Irregular heart beat     has ICD Pacemaker and Medication       PAST SURGICAL HISTORY:  Past Surgical History:   Procedure Laterality Date    AORTIC VALVE REPLACEMENT      CARDIAC DEFIBRILLATOR PLACEMENT      pulse generator    CATARACT EXTRACTION, BILATERAL Bilateral     HIP SURGERY Bilateral     PROSTATE CANCER GENE 3(PCA3) (HISTORICAL)      removed 1998 Urology Dr Celine Oneal,     PROSTATECTOMY         FAMILY HISTORY:  Family History   Problem Relation Age of Onset    Emphysema Mother     Substance Abuse Other     Cerebral aneurysm Brother     Dementia Neg Hx        SOCIAL HISTORY:  Social History     Socioeconomic History    Marital status: /Civil Union     Spouse name: Not on file    Number of children: Not on file    Years of education: Not on file    Highest education level: Not on file   Occupational History    Occupation: retired    Tobacco Use    Smoking status: Never Smoker    Smokeless tobacco: Never Used   Vaping Use    Vaping Use: Never used   Substance and Sexual Activity    Alcohol use: Yes     Comment: social    Drug use: No    Sexual activity: Not Currently     Comment: denied: history of high risk sexual behavior   Other Topics Concern    Not on file   Social History Narrative        2 children 3 step children    Retired-/Material Handling    No teeth    Hobbies-golfing     Social Determinants of Health     Financial Resource Strain: Not on file   Food Insecurity: Not on file   Transportation Needs: Not on file   Physical Activity: Not on file   Stress: Not on file   Social Connections: Not on file   Intimate Partner Violence: Not on file   Housing Stability: Not on file       Review of Systems   Constitutional: Negative for activity change, chills, fatigue and fever  HENT: Negative for congestion  Eyes: Positive for visual disturbance (  Wears glasses)  Negative for discharge  Respiratory: Negative for cough, chest tightness and shortness of breath  Cardiovascular: Negative for chest pain, palpitations and leg swelling  Gastrointestinal: Negative for abdominal pain, blood in stool, diarrhea, nausea and vomiting  Endocrine: Negative for polydipsia, polyphagia and polyuria  Genitourinary: Negative for difficulty urinating  Musculoskeletal: Negative for arthralgias and myalgias  Skin: Negative for rash  Allergic/Immunologic: Negative for immunocompromised state  Neurological: Negative for dizziness, syncope, weakness, light-headedness and headaches  Hematological: Negative for adenopathy  Does not bruise/bleed easily  Psychiatric/Behavioral: Negative for dysphoric mood, sleep disturbance and suicidal ideas  The patient is not nervous/anxious  Objective:  Vitals:    01/19/22 1338   BP: 130/62   BP Location: Left arm   Patient Position: Sitting   Cuff Size: Adult   Pulse: 77   Temp: (!) 97 1 °F (36 2 °C)   TempSrc: Tympanic   SpO2: 97%   Weight: 86 5 kg (190 lb 9 6 oz)   Height: 5' 7" (1 702 m)     Body mass index is 29 85 kg/m²  Physical Exam  Vitals and nursing note reviewed  Constitutional:       General: He is not in acute distress  Appearance: He is well-developed  He is not ill-appearing  HENT:      Head: Normocephalic and atraumatic  Eyes:      Extraocular Movements: Extraocular movements intact  Pupils: Pupils are equal, round, and reactive to light  Neck:      Thyroid: No thyromegaly  Vascular: No carotid bruit or JVD  Cardiovascular:      Rate and Rhythm: Normal rate and regular rhythm  Heart sounds: Normal heart sounds  Pulmonary:      Effort: Pulmonary effort is normal  No respiratory distress  Breath sounds: Normal breath sounds  Musculoskeletal:      Cervical back: Neck supple  Right lower leg: Edema ( 1+) present  Left lower leg: Edema ( 2+ pedal and ankle edema) present  Lymphadenopathy:      Cervical: No cervical adenopathy  Skin:     General: Skin is warm and dry  Findings: No rash  Neurological:      General: No focal deficit present  Mental Status: He is alert and oriented to person, place, and time  Mental status is at baseline     Psychiatric:         Mood and Affect: Mood normal          Behavior: Behavior normal  RESULTS:    Recent Results (from the past 1008 hour(s))   CBC and differential    Collection Time: 01/11/22  7:40 AM   Result Value Ref Range    WBC 6 00 4 31 - 10 16 Thousand/uL    RBC 3 63 (L) 3 88 - 5 62 Million/uL    Hemoglobin 11 1 (L) 12 0 - 17 0 g/dL    Hematocrit 33 4 (L) 36 5 - 49 3 %    MCV 92 82 - 98 fL    MCH 30 6 26 8 - 34 3 pg    MCHC 33 2 31 4 - 37 4 g/dL    RDW 13 1 11 6 - 15 1 %    MPV 11 1 8 9 - 12 7 fL    Platelets 386 (L) 772 - 390 Thousands/uL    nRBC 0 /100 WBCs    Neutrophils Relative 57 43 - 75 %    Immat GRANS % 0 0 - 2 %    Lymphocytes Relative 26 14 - 44 %    Monocytes Relative 12 4 - 12 %    Eosinophils Relative 4 0 - 6 %    Basophils Relative 1 0 - 1 %    Neutrophils Absolute 3 46 1 85 - 7 62 Thousands/µL    Immature Grans Absolute 0 02 0 00 - 0 20 Thousand/uL    Lymphocytes Absolute 1 55 0 60 - 4 47 Thousands/µL    Monocytes Absolute 0 70 0 17 - 1 22 Thousand/µL    Eosinophils Absolute 0 21 0 00 - 0 61 Thousand/µL    Basophils Absolute 0 06 0 00 - 0 10 Thousands/µL   Comprehensive metabolic panel    Collection Time: 01/11/22  7:40 AM   Result Value Ref Range    Sodium 141 136 - 145 mmol/L    Potassium 4 1 3 5 - 5 3 mmol/L    Chloride 110 (H) 100 - 108 mmol/L    CO2 26 21 - 32 mmol/L    ANION GAP 5 4 - 13 mmol/L    BUN 18 5 - 25 mg/dL    Creatinine 0 84 0 60 - 1 30 mg/dL    Glucose, Fasting 101 (H) 65 - 99 mg/dL    Calcium 9 0 8 3 - 10 1 mg/dL    AST 23 5 - 45 U/L    ALT 26 12 - 78 U/L    Alkaline Phosphatase 49 46 - 116 U/L    Total Protein 6 8 6 4 - 8 2 g/dL    Albumin 3 7 3 5 - 5 0 g/dL    Total Bilirubin 0 66 0 20 - 1 00 mg/dL    eGFR 78 ml/min/1 73sq m   Lipid panel    Collection Time: 01/11/22  7:40 AM   Result Value Ref Range    Cholesterol 149 See Comment mg/dL    Triglycerides 60 See Comment mg/dL    HDL, Direct 69 >=40 mg/dL    LDL Calculated 68 0 - 100 mg/dL    Non-HDL-Chol (CHOL-HDL) 80 mg/dl   Iron Saturation %    Collection Time: 01/11/22  7:40 AM   Result Value Ref Range    Iron Saturation 32 20 - 50 %    TIBC 336 250 - 450 ug/dL    Iron 107 65 - 175 ug/dL   Ferritin    Collection Time: 01/11/22  7:40 AM   Result Value Ref Range    Ferritin 111 8 - 388 ng/mL       This note was created with voice recognition software  Phonic, grammatical and spelling errors may be present within the note as a result

## 2022-01-19 NOTE — PATIENT INSTRUCTIONS
No change in medical management  Continue to follow with her specialist   Will schedule follow-up in 4 months with repeat labs for that visit  Follow-up sooner as needed

## 2022-01-21 ENCOUNTER — VBI (OUTPATIENT)
Dept: ADMINISTRATIVE | Facility: OTHER | Age: 87
End: 2022-01-21

## 2022-02-02 ENCOUNTER — HOSPITAL ENCOUNTER (OUTPATIENT)
Dept: BONE DENSITY | Facility: CLINIC | Age: 87
Discharge: HOME | End: 2022-02-02
Payer: MEDICARE

## 2022-02-02 DIAGNOSIS — M81.0 OSTEOPOROSIS, UNSPECIFIED OSTEOPOROSIS TYPE, UNSPECIFIED PATHOLOGICAL FRACTURE PRESENCE: ICD-10-CM

## 2022-02-02 PROCEDURE — 77080 DXA BONE DENSITY AXIAL: CPT

## 2022-04-05 ENCOUNTER — OFFICE VISIT (OUTPATIENT)
Dept: INTERNAL MEDICINE CLINIC | Facility: CLINIC | Age: 87
End: 2022-04-05
Payer: MEDICARE

## 2022-04-05 VITALS
TEMPERATURE: 98.4 F | DIASTOLIC BLOOD PRESSURE: 64 MMHG | WEIGHT: 194.6 LBS | OXYGEN SATURATION: 98 % | SYSTOLIC BLOOD PRESSURE: 112 MMHG | BODY MASS INDEX: 30.54 KG/M2 | RESPIRATION RATE: 18 BRPM | HEIGHT: 67 IN | HEART RATE: 70 BPM

## 2022-04-05 DIAGNOSIS — J34.89 RHINORRHEA: ICD-10-CM

## 2022-04-05 DIAGNOSIS — J02.9 SORE THROAT: ICD-10-CM

## 2022-04-05 DIAGNOSIS — J30.9 ALLERGIC RHINITIS, UNSPECIFIED SEASONALITY, UNSPECIFIED TRIGGER: Primary | ICD-10-CM

## 2022-04-05 LAB — S PYO AG THROAT QL: NEGATIVE

## 2022-04-05 PROCEDURE — 99213 OFFICE O/P EST LOW 20 MIN: CPT | Performed by: PHYSICIAN ASSISTANT

## 2022-04-05 PROCEDURE — 87880 STREP A ASSAY W/OPTIC: CPT | Performed by: PHYSICIAN ASSISTANT

## 2022-04-05 NOTE — PROGRESS NOTES
Assessment/Plan:   Patient Instructions   For ongoing runny nose which seems to be allergy stimulated, start  Claritin (loratadine ) 10 mg daily  Can also add Flonase nasal inhaler 1 spray each nostril morning and night as demonstrated an instructed  Please give status call on Friday as to how this medication is working  Symptoms do not appear to be infection related  Quality Measures:   Depression Screening and Follow-up Plan: Patient was screened for depression during today's encounter  They screened negative with a PHQ-2 score of 0  Return for Next scheduled follow up  Diagnoses and all orders for this visit:    Allergic rhinitis, unspecified seasonality, unspecified trigger    Rhinorrhea    Sore throat  -     POCT rapid strepA          Subjective:      Patient ID: Vira Ness is a 80 y o  male  Acute visit    2-3 days of runny nose of clear mucus, watery eyes, sneezing, and postnasal drip  No fever or chills  Can smell and taste  Has not been around any ill people  Throat has been sore, no earaches, feels tired  Denies previous history of any allergies        ALLERGIES:  Allergies   Allergen Reactions    Simvastatin Allergic Rhinitis       CURRENT MEDICATIONS:    Current Outpatient Medications:     carvedilol (COREG) 12 5 mg tablet, Take 18 75 mg by mouth, Disp: , Rfl:     cyanocobalamin 100 MCG tablet, Take 2,000 mcg by mouth, Disp: , Rfl:     ELIQUIS 5 MG, Take 5 mg by mouth 2 (two) times a day, Disp: , Rfl: 1    ferrous sulfate 325 (65 Fe) mg tablet, Take 325 mg by mouth daily with breakfast, Disp: , Rfl:     furosemide (LASIX) 20 mg tablet, Take 40 mg by mouth daily , Disp: , Rfl:     Iron-Vitamin C (IRON 100/C PO), Take by mouth, Disp: , Rfl:     leuprolide (ELIGARD) 45 MG injection, Inject 45 mg under the skin, Disp: , Rfl:     lifitegrast (XIIDRA) 5 % op solution, Apply 1 drop to eye, Disp: , Rfl:     lisinopril (ZESTRIL) 5 mg tablet, Take 5 mg by mouth, Disp: , Rfl:     Multiple Vitamin (MULTIVITAMIN PO), Take by mouth, Disp: , Rfl:     Omega-3 Fatty Acids (FISH OIL PO), Take 2,400 mg by mouth, Disp: , Rfl:     potassium chloride (KLOR-CON) 20 mEq packet, Take 20 mEq by mouth daily , Disp: , Rfl:     rosuvastatin (CRESTOR) 20 MG tablet, TAKE 1 TABLET DAILY, Disp: 90 tablet, Rfl: 3    ACTIVE PROBLEM LIST:  Patient Active Problem List   Diagnosis    Chronic systolic heart failure (HCC)    Essential hypertension    H/O aortic valve replacement with tissue graft    Mixed hyperlipidemia    Impaired fasting glucose    Iron deficiency anemia    Coronary artery disease involving native coronary artery of native heart without angina pectoris    [facility] fibrillation[facility] ([facility])    Transient amnesia    Thrombocytopenia ([facility]    Osteoporosis       PAST MEDICAL HISTORY:  Past Medical History:   Diagnosis Date    Arthritis     HIP    Cancer of prostate ([facility] [facility] ) 1998    removed in [place] Dr James Rothman    Heart attack ([facility] [address] )     High blood pressure     High cholesterol     Irregular heart beat     has ICD Pacemaker and Medication       PAST SURGICAL HISTORY:  Past Surgical History:   Procedure Laterality Date    AORTIC VALVE REPLACEMENT      CARDIAC DEFIBRILLATOR PLACEMENT      pulse generator    CATARACT EXTRACTION, BILATERAL Bilateral     HIP SURGERY Bilateral     PROSTATE CANCER GENE 3(PCA3) (HISTORICAL)      removed 1998 Urology Dr Judy Rouseland,     PROSTATECTOMY         FAMILY HISTORY:  Family History   Problem Relation Age of Onset    Emphysema Mother     Substance Abuse Other     Cerebral aneurysm Brother     Dementia Neg Hx        SOCIAL HISTORY:  Social History     Socioeconomic History    Marital status: /Civil Union     Spouse name: Not on file    Number of children: Not on file    Years of education: Not on file    Highest education level: Not on file   Occupational History    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manager   Tobacco Use    Smoking status: Never Smoker    Smokeless tobacco: Never Used   Vaping Use    Vaping Use: Never used   Substance and Sexual Activity    Alcohol use: Yes     Comment: social    Drug use: No    Sexual activity: Not Currently     Comment: denied: history of high risk sexual behavior   Other Topics Concern    Not on file   Social History Narrative        2 children 3 step children    Retired-/Material Handling    No teeth    Hobbies-golfing     Social Determinants of Health     Financial Resource Strain: Not on file   Food Insecurity: Not on file   Transportation Needs: Not on file   Physical Activity: Not on file   Stress: Not on file   Social Connections: Not on file   Intimate Partner Violence: Not on file   Housing Stability: Not on file       Review of Systems   Constitutional: Positive for fatigue  Negative for activity change, chills and fever  HENT: Positive for congestion, postnasal drip, rhinorrhea, sneezing and sore throat  Negative for ear pain, sinus pressure, sinus pain, tinnitus, trouble swallowing and voice change  Eyes: Positive for discharge, redness and itching  Negative for visual disturbance  Respiratory: Negative for cough, chest tightness and shortness of breath  Cardiovascular: Negative for chest pain, palpitations and leg swelling  Gastrointestinal: Negative for abdominal pain and diarrhea  Genitourinary: Negative for difficulty urinating  Musculoskeletal: Negative for arthralgias and myalgias  Skin: Negative for rash  Allergic/Immunologic: Negative for immunocompromised state  Neurological: Negative for dizziness, syncope, weakness, light-headedness and headaches  Hematological: Negative for adenopathy  Does not bruise/bleed easily  Psychiatric/Behavioral: Negative for dysphoric mood  The patient is not nervous/anxious            Objective:  Vitals:    04/05/22 1044   BP: 112/64   BP Location: Left arm   Patient Position: Sitting   Cuff Size: Adult   Pulse: 70   Resp: 18   Temp: 98 4 °F (36 9 °C)   TempSrc: Tympanic   SpO2: 98%   Weight: 88 3 kg (194 lb 9 6 oz)   Height: 5' 7" (1 702 m)     Body mass index is 30 48 kg/m²  Physical Exam  Vitals and nursing note reviewed  Constitutional:       General: He is not in acute distress  Appearance: He is well-developed  HENT:      Head: Normocephalic and atraumatic  Comments: HEENT-granular, injected conjunctivae,  TMs not examined as patient wearing bilateral hearing aids,nasal mucosa pale and boggy with clear/white mucoid drainage, granular soft palate, posterior pharynx with clear/white post nasal drainage, denies tenderness of sinuses  Eyes:      Extraocular Movements: Extraocular movements intact  Pupils: Pupils are equal, round, and reactive to light  Neck:      Thyroid: No thyromegaly  Vascular: No carotid bruit or JVD  Cardiovascular:      Rate and Rhythm: Normal rate and regular rhythm  Heart sounds: Normal heart sounds  Pulmonary:      Effort: Pulmonary effort is normal  No respiratory distress  Breath sounds: Normal breath sounds  Musculoskeletal:      Cervical back: Neck supple  Right lower leg: No edema  Left lower leg: No edema  Lymphadenopathy:      Cervical: No cervical adenopathy  Skin:     General: Skin is warm and dry  Findings: No rash  Neurological:      General: No focal deficit present  Mental Status: He is alert and oriented to person, place, and time  Mental status is at baseline  Psychiatric:         Mood and Affect: Mood normal          Behavior: Behavior normal            RESULTS:    Recent Results (from the past 1008 hour(s))   POCT rapid strepA    Collection Time: 04/05/22 11:00 AM   Result Value Ref Range     RAPID STREP A Negative Negative       This note was created with voice recognition software  Phonic, grammatical and spelling errors may be present within the note as a result

## 2022-04-05 NOTE — PATIENT INSTRUCTIONS
For ongoing runny nose which seems to be allergy stimulated, start  Claritin (loratadine ) 10 mg daily  Can also add Flonase nasal inhaler 1 spray each nostril morning and night as demonstrated an instructed  Please give status call on Friday as to how this medication is working  Symptoms do not appear to be infection related

## 2022-04-08 ENCOUNTER — TELEPHONE (OUTPATIENT)
Dept: INTERNAL MEDICINE CLINIC | Facility: CLINIC | Age: 87
End: 2022-04-08

## 2022-04-08 NOTE — TELEPHONE ENCOUNTER
He started on the Claritan on 04/05 and they are helping him  He also got a nose spray and used it for two days and it made his inside of his nose sore  It did help some  He is also resting and does feel better  Should he continue the [address]? He did get a 30 day supply  He will use nose spray as needed  Please advise        Call back #[phone]

## 2022-04-08 NOTE — TELEPHONE ENCOUNTER
Spoke with the patient and advised him of this  He verbalized understanding and will continue with the Claritin daily and Flonase PRN

## 2022-05-09 ENCOUNTER — TELEPHONE (OUTPATIENT)
Dept: INTERNAL MEDICINE CLINIC | Facility: CLINIC | Age: 87
End: 2022-05-09

## 2022-05-09 NOTE — TELEPHONE ENCOUNTER
Spoke with Artist Jose David and notified her that it would be alright from him to take Mucinex  She verbalized understanding

## 2022-05-09 NOTE — TELEPHONE ENCOUNTER
Patient is taking Claritin for his allergies  He seems to have developed some chest congestion  He would like to know what is the best OTC medicine he can take  He is not sure what he can take with his other medications  He has no other symptoms  Please advise    Call back #[phone]  Can leave message with Sariah Doan if he is not available

## 2022-05-10 ENCOUNTER — NURSE TRIAGE (OUTPATIENT)
Dept: OTHER | Facility: OTHER | Age: 87
End: 2022-05-10

## 2022-05-10 NOTE — TELEPHONE ENCOUNTER
Reason for Disposition   [1] Continuous (nonstop) coughing interferes with work or school AND [2] no improvement using cough treatment per Care Advice    Answer Assessment - Initial Assessment Questions  1  ONSET: "When did the cough begin?"       Had cough for approximately a month; was dx with allergies per PCP and on claritain and flonase  Friday started getting worse; called PCP on Monday and started on Mucinex  Today at dinner, patient choked on sputum; was gagging and coughed up large amount of phlegm  Also complains of laryngitis  2  SEVERITY: "How bad is the cough today?"       Mod  3  SPUTUM: "Describe the color of your sputum" (none, dry cough; clear, white, yellow, green)      Yellow  4  HEMOPTYSIS: "Are you coughing up any blood?" If so ask: "How much?" (flecks, streaks, tablespoons, etc )      Denies  5  DIFFICULTY BREATHING: "Are you having difficulty breathing?" If Yes, ask: "How bad is it?" (e g , mild, moderate, severe)     - MILD: No SOB at rest, mild SOB with walking, speaks normally in sentences, can lay down, no retractions, pulse < 100      - MODERATE: SOB at rest, SOB with minimal exertion and prefers to sit, cannot lie down flat, speaks in phrases, mild retractions, audible wheezing, pulse 100-120      - SEVERE: Very SOB at rest, speaks in single words, struggling to breathe, sitting hunched forward, retractions, pulse > 120       Denies  6  FEVER: "Do you have a fever?" If Yes, ask: "What is your temperature, how was it measured, and when did it start?"      Denies  7  CARDIAC HISTORY: "Do you have any history of heart disease?" (e g , heart attack, congestive heart failure)       Heart disease; pacemaker/ICD, open heart surgery  Valve replacement  8  LUNG HISTORY: "Do you have any history of lung disease?"  (e g , pulmonary embolus, asthma, emphysema)      Denies  9   PE RISK FACTORS: "Do you have a history of blood clots?" (or: recent major surgery, recent prolonged travel, bedridden)      Denies  10  OTHER SYMPTOMS: "Do you have any other symptoms?" (e g , runny nose, wheezing, chest pain)        Denies chest pain,denies SOB  Runny nose/ congestion  Patient stated this morning it was hard for patient to take a deep breath in    11  PREGNANCY: "Is there any chance you are pregnant?" "When was your last menstrual period?"        N/A  12  TRAVEL: "Have you traveled out of the country in the last month?" (e g , travel history, exposures)        N/A    Granddaughter tested positive April 11th  Tested in April and was negative      Protocols used: COUGH - ACUTE PRODUCTIVE-ADULT-AH

## 2022-05-10 NOTE — TELEPHONE ENCOUNTER
Offered for patient to be seen at [address] [place] [address], but patient refused  Wanted to follow up with PCP tomorrow  Advised granddaughter if symptoms worsened, or if patient had another episode where he choked and brought up phlegm to have him be seen in the ED  Patient denies any SOB or chest pain and feels well now  Granddaughter verbalized understanding

## 2022-05-10 NOTE — TELEPHONE ENCOUNTER
Regarding: mucus/cough   ----- Message from Toribio Moreau sent at 5/10/2022  5:56 PM EDT -----  " My grandfather has a lot of mucus   He just chocked on his mucus earlier and I am getting worried  "

## 2022-05-12 ENCOUNTER — HOSPITAL ENCOUNTER (OUTPATIENT)
Dept: RADIOLOGY | Facility: HOSPITAL | Age: 87
Discharge: HOME | End: 2022-05-12
Payer: MEDICARE

## 2022-05-12 ENCOUNTER — OFFICE VISIT (OUTPATIENT)
Dept: INTERNAL MEDICINE CLINIC | Facility: CLINIC | Age: 87
End: 2022-05-12
Payer: MEDICARE

## 2022-05-12 VITALS
HEART RATE: 76 BPM | DIASTOLIC BLOOD PRESSURE: 60 MMHG | WEIGHT: 191 LBS | HEIGHT: 67 IN | BODY MASS INDEX: 29.98 KG/M2 | TEMPERATURE: 98.4 F | SYSTOLIC BLOOD PRESSURE: 136 MMHG | OXYGEN SATURATION: 96 %

## 2022-05-12 DIAGNOSIS — J98.01 BRONCHOSPASM: ICD-10-CM

## 2022-05-12 DIAGNOSIS — J18.9 PNEUMONIA OF LEFT LOWER LOBE DUE TO INFECTIOUS ORGANISM: ICD-10-CM

## 2022-05-12 DIAGNOSIS — J18.9 PNEUMONIA OF LEFT LOWER LOBE DUE TO INFECTIOUS ORGANISM: Primary | ICD-10-CM

## 2022-05-12 PROCEDURE — 99213 OFFICE O/P EST LOW 20 MIN: CPT | Performed by: PHYSICIAN ASSISTANT

## 2022-05-12 PROCEDURE — 71046 X-RAY EXAM CHEST 2 VIEWS: CPT

## 2022-05-12 RX ORDER — CEFUROXIME AXETIL 250 MG/1
250 TABLET ORAL EVERY 12 HOURS SCHEDULED
Qty: 20 TABLET | Refills: 0 | Status: SHIPPED | OUTPATIENT
Start: 2022-05-12 | End: 2022-05-22

## 2022-05-12 RX ORDER — METHYLPREDNISOLONE 4 MG/1
TABLET ORAL
Qty: 21 TABLET | Refills: 0 | Status: SHIPPED | OUTPATIENT
Start: 2022-05-12 | End: 2022-05-19 | Stop reason: ALTCHOICE

## 2022-05-12 NOTE — PATIENT INSTRUCTIONS
Clinically suspect you may have a pneumonia  Have chest x-ray done today and I will then call you with the results  Start antibiotic and take twice daily  Always wise to take a probiotic and or eat yogurt daily when on an antibiotic  Rest, increase fluids  Tylenol for fever or aches as per package instructions  Start Medrol pack and take as directed  Can use your over-the-counter cough medicine  I want to recheck you next week

## 2022-05-12 NOTE — PROGRESS NOTES
Assessment/Plan:   Patient Instructions   Clinically suspect you may have a pneumonia  Have chest x-ray done today and I will then call you with the results  Start antibiotic and take twice daily  Always wise to take a probiotic and or eat yogurt daily when on an antibiotic  Rest, increase fluids  Tylenol for fever or aches as per package instructions  Start Medrol pack and take as directed  Can use your over-the-counter cough medicine  I want to recheck you next week  Quality Measures:   Depression Screening and Follow-up Plan: Patient was screened for depression during today's encounter  They screened negative with a PHQ-2 score of 0  Return in about 1 week (around 5/19/2022) for Recheck  Diagnoses and all orders for this visit:    Pneumonia of left lower lobe due to infectious organism  -     XR chest pa & lateral; Future  -     cefuroxime (CEFTIN) 250 mg tablet; Take 1 tablet (250 mg total) by mouth every 12 (twelve) hours for 10 days    Bronchospasm  -     XR chest pa & lateral; Future  -     methylPREDNISolone 4 MG tablet therapy pack; Use as directed on package          Subjective:      Patient ID: Gavin Dobson is a 80 y o  male      HPI    ALLERGIES:  Allergies   Allergen Reactions    Simvastatin Allergic Rhinitis       CURRENT MEDICATIONS:    Current Outpatient Medications:     carvedilol (COREG) 12 5 mg tablet, Take 18 75 mg by mouth, Disp: , Rfl:     cefuroxime (CEFTIN) 250 mg tablet, Take 1 tablet (250 mg total) by mouth every 12 (twelve) hours for 10 days, Disp: 20 tablet, Rfl: 0    cyanocobalamin 100 MCG tablet, Take 2,000 mcg by mouth, Disp: , Rfl:     ELIQUIS 5 MG, Take 5 mg by mouth 2 (two) times a day, Disp: , Rfl: 1    ferrous sulfate 325 (65 Fe) mg tablet, Take 325 mg by mouth daily with breakfast, Disp: , Rfl:     furosemide (LASIX) 20 mg tablet, Take 40 mg by mouth daily , Disp: , Rfl:     Iron-Vitamin C (IRON 100/C PO), Take by mouth, Disp: , Rfl:    leuprolide (ELIGARD) 45 MG injection, Inject 45 mg under the skin, Disp: , Rfl:     lifitegrast (XIIDRA) 5 % op solution, Apply 1 drop to eye, Disp: , Rfl:     lisinopril (ZESTRIL) 5 mg tablet, Take 5 mg by mouth, Disp: , Rfl:     methylPREDNISolone 4 MG tablet therapy pack, Use as directed on package, Disp: 21 tablet, Rfl: 0    Multiple Vitamin (MULTIVITAMIN PO), Take by mouth, Disp: , Rfl:     Omega-3 Fatty Acids (FISH OIL PO), Take 2,400 mg by mouth, Disp: , Rfl:     rosuvastatin (CRESTOR) 20 MG tablet, TAKE 1 TABLET DAILY, Disp: 90 tablet, Rfl: 3    potassium chloride (KLOR-CON) 20 mEq packet, Take 20 mEq by mouth daily  (Patient not taking: Reported on 5/12/2022), Disp: , Rfl:     ACTIVE PROBLEM LIST:  Patient Active Problem List   Diagnosis    [facility]    Essential hypertension    H/O aortic valve replacement with tissue graft    Mixed hyperlipidemia    Impaired fasting glucose    Iron deficiency anemia    Coronary artery disease involving native coronary artery of native heart without angina pectoris    PAF (paroxysmal atrial fibrillation) (HCC)    Transient amnesia    Thrombocytopenia (HCC)    Osteoporosis       PAST MEDICAL HISTORY:  Past Medical History:   Diagnosis Date    Arthritis     HIP    Cancer of prostate ([facility] [address] ) 1998    removed in [facility] Dr mEilio Steve    [facility]     High blood pressure     High cholesterol     Irregular heart beat     has ICD Pacemaker and Medication       PAST SURGICAL HISTORY:  Past Surgical History:   Procedure Laterality Date    AORTIC VALVE REPLACEMENT      CARDIAC DEFIBRILLATOR PLACEMENT      pulse generator    CATARACT EXTRACTION, BILATERAL Bilateral     HIP SURGERY Bilateral     PROSTATE CANCER GENE 3(PCA3) (HISTORICAL)      removed 1998 Urology Dr Lotus Bashir,     PROSTATECTOMY         FAMILY HISTORY:  Family History   Problem Relation Age of Onset    Emphysema Mother     Substance Abuse Other  Cerebral aneurysm Brother     Dementia Neg Hx        SOCIAL HISTORY:  Social History     Socioeconomic History    Marital status: /Civil Union     Spouse name: Not on file    Number of children: Not on file    Years of education: Not on file    Highest education level: Not on file   Occupational History    Occupation: retired    Tobacco Use    Smoking status: Never Smoker    Smokeless tobacco: Never Used   Vaping Use    Vaping Use: Never used   Substance and Sexual Activity    Alcohol use: Yes     Comment: social    Drug use: No    Sexual activity: Not Currently     Comment: denied: history of high risk sexual behavior   Other Topics Concern    Not on file   Social History Narrative        2 children 3 step children    Retired-/Material Handling    No teeth    Hobbies-golfing     Social Determinants of Health     Financial Resource Strain: Not on file   Food Insecurity: Not on file   Transportation Needs: Not on file   Physical Activity: Not on file   Stress: Not on file   Social Connections: Not on file   Intimate Partner Violence: Not on file   Housing Stability: Not on file       Review of Systems   Constitutional: Positive for fatigue  Negative for activity change, chills and fever  HENT: Positive for postnasal drip, rhinorrhea and sneezing  Negative for congestion  Eyes: Negative for discharge  Respiratory: Positive for cough  Negative for chest tightness and shortness of breath  Cardiovascular: Negative for chest pain, palpitations and leg swelling  Gastrointestinal: Negative for abdominal pain  Genitourinary: Negative for difficulty urinating  Musculoskeletal: Negative for arthralgias and myalgias  Skin: Negative for rash  Allergic/Immunologic: Negative for immunocompromised state  Neurological: Negative for dizziness, syncope, weakness, light-headedness and headaches  Hematological: Negative for adenopathy   Does not bruise/bleed easily  Psychiatric/Behavioral: Negative for dysphoric mood  The patient is not nervous/anxious  Objective:  Vitals:    05/12/22 1354   BP: 136/60   BP Location: Right arm   Patient Position: Sitting   Cuff Size: Large   Pulse: 76   Temp: 98 4 °F (36 9 °C)   SpO2: 96%   Weight: 86 6 kg (191 lb)   Height: 5' 7" (1 702 m)     Body mass index is 29 91 kg/m²  Physical Exam  Vitals and nursing note reviewed  Constitutional:       General: He is not in acute distress  Appearance: He is well-developed  HENT:      Head: Normocephalic and atraumatic  Comments: HEENT-granular, injected conjunctivae, TM's with fluid behind,nasal mucosa pale and boggy with clear/white mucoid drainage, granular soft palate, posterior pharynx with clear/white post nasal drainage  Eyes:      Extraocular Movements: Extraocular movements intact  Pupils: Pupils are equal, round, and reactive to light  Neck:      Thyroid: No thyromegaly  Vascular: No carotid bruit or JVD  Cardiovascular:      Rate and Rhythm: Normal rate and regular rhythm  Heart sounds: Normal heart sounds  Pulmonary:      Effort: Pulmonary effort is normal  No respiratory distress  Comments: Scattered rhonchi and crackles more prominent left base  Late inspiratory wheezes and expiratory wheezes throughout more prominent in the upper lung fields  Musculoskeletal:      Cervical back: Neck supple  Right lower leg: Edema (  Trace) present  Left lower leg: Edema ( trace) present  Lymphadenopathy:      Cervical: No cervical adenopathy  Skin:     General: Skin is warm and dry  Findings: No rash  Neurological:      General: No focal deficit present  Mental Status: He is alert and oriented to person, place, and time  Mental status is at baseline     Psychiatric:         Mood and Affect: Mood normal          Behavior: Behavior normal            RESULTS:    Recent Results (from the past 1008 hour(s))   POCT rapid strepA    Collection Time: 04/05/22 11:00 AM   Result Value Ref Range     RAPID STREP A Negative Negative       This note was created with voice recognition software  Phonic, grammatical and spelling errors may be present within the note as a result

## 2022-05-19 ENCOUNTER — APPOINTMENT (OUTPATIENT)
Dept: LAB | Facility: CLINIC | Age: 87
End: 2022-05-19
Payer: MEDICARE

## 2022-05-19 ENCOUNTER — OFFICE VISIT (OUTPATIENT)
Dept: INTERNAL MEDICINE CLINIC | Facility: CLINIC | Age: 87
End: 2022-05-19
Payer: MEDICARE

## 2022-05-19 VITALS
RESPIRATION RATE: 14 BRPM | BODY MASS INDEX: 30.45 KG/M2 | HEIGHT: 67 IN | OXYGEN SATURATION: 99 % | HEART RATE: 83 BPM | SYSTOLIC BLOOD PRESSURE: 120 MMHG | TEMPERATURE: 98.6 F | DIASTOLIC BLOOD PRESSURE: 60 MMHG | WEIGHT: 194 LBS

## 2022-05-19 DIAGNOSIS — J18.9 PNEUMONIA OF LEFT LOWER LOBE DUE TO INFECTIOUS ORGANISM: Primary | ICD-10-CM

## 2022-05-19 DIAGNOSIS — D50.9 IRON DEFICIENCY ANEMIA, UNSPECIFIED IRON DEFICIENCY ANEMIA TYPE: ICD-10-CM

## 2022-05-19 DIAGNOSIS — C61 MALIGNANT NEOPLASM OF PROSTATE (HCC): ICD-10-CM

## 2022-05-19 DIAGNOSIS — J98.01 BRONCHOSPASM: ICD-10-CM

## 2022-05-19 DIAGNOSIS — E78.2 MIXED HYPERLIPIDEMIA: ICD-10-CM

## 2022-05-19 DIAGNOSIS — I10 ESSENTIAL HYPERTENSION: ICD-10-CM

## 2022-05-19 DIAGNOSIS — R73.01 IMPAIRED FASTING GLUCOSE: ICD-10-CM

## 2022-05-19 LAB
ALBUMIN SERPL BCP-MCNC: 3.3 G/DL (ref 3.5–5)
ALP SERPL-CCNC: 48 U/L (ref 46–116)
ALT SERPL W P-5'-P-CCNC: 93 U/L (ref 12–78)
ANION GAP SERPL CALCULATED.3IONS-SCNC: 5 MMOL/L (ref 4–13)
AST SERPL W P-5'-P-CCNC: 56 U/L (ref 5–45)
BASOPHILS # BLD AUTO: 0.05 THOUSANDS/ΜL (ref 0–0.1)
BASOPHILS NFR BLD AUTO: 1 % (ref 0–1)
BILIRUB SERPL-MCNC: 0.63 MG/DL (ref 0.2–1)
BUN SERPL-MCNC: 19 MG/DL (ref 5–25)
CALCIUM ALBUM COR SERPL-MCNC: 9.1 MG/DL (ref 8.3–10.1)
CALCIUM SERPL-MCNC: 8.5 MG/DL (ref 8.3–10.1)
CHLORIDE SERPL-SCNC: 108 MMOL/L (ref 100–108)
CHOLEST SERPL-MCNC: 131 MG/DL
CO2 SERPL-SCNC: 26 MMOL/L (ref 21–32)
CREAT SERPL-MCNC: 0.79 MG/DL (ref 0.6–1.3)
EOSINOPHIL # BLD AUTO: 0.2 THOUSAND/ΜL (ref 0–0.61)
EOSINOPHIL NFR BLD AUTO: 2 % (ref 0–6)
ERYTHROCYTE [DISTWIDTH] IN BLOOD BY AUTOMATED COUNT: 13.2 % (ref 11.6–15.1)
EST. AVERAGE GLUCOSE BLD GHB EST-MCNC: 120 MG/DL
GFR SERPL CREATININE-BSD FRML MDRD: 80 ML/MIN/1.73SQ M
GLUCOSE P FAST SERPL-MCNC: 108 MG/DL (ref 65–99)
HBA1C MFR BLD: 5.8 %
HCT VFR BLD AUTO: 33.8 % (ref 36.5–49.3)
HDLC SERPL-MCNC: 64 MG/DL
HGB BLD-MCNC: 11.5 G/DL (ref 12–17)
IMM GRANULOCYTES # BLD AUTO: 0.03 THOUSAND/UL (ref 0–0.2)
IMM GRANULOCYTES NFR BLD AUTO: 0 % (ref 0–2)
LDLC SERPL CALC-MCNC: 54 MG/DL (ref 0–100)
LYMPHOCYTES # BLD AUTO: 2.08 THOUSANDS/ΜL (ref 0.6–4.47)
LYMPHOCYTES NFR BLD AUTO: 25 % (ref 14–44)
MCH RBC QN AUTO: 31.5 PG (ref 26.8–34.3)
MCHC RBC AUTO-ENTMCNC: 34 G/DL (ref 31.4–37.4)
MCV RBC AUTO: 93 FL (ref 82–98)
MONOCYTES # BLD AUTO: 0.76 THOUSAND/ΜL (ref 0.17–1.22)
MONOCYTES NFR BLD AUTO: 9 % (ref 4–12)
NEUTROPHILS # BLD AUTO: 5.24 THOUSANDS/ΜL (ref 1.85–7.62)
NEUTS SEG NFR BLD AUTO: 63 % (ref 43–75)
NONHDLC SERPL-MCNC: 67 MG/DL
NRBC BLD AUTO-RTO: 0 /100 WBCS
PLATELET # BLD AUTO: 140 THOUSANDS/UL (ref 149–390)
PMV BLD AUTO: 10.1 FL (ref 8.9–12.7)
POTASSIUM SERPL-SCNC: 4.5 MMOL/L (ref 3.5–5.3)
PROT SERPL-MCNC: 6.4 G/DL (ref 6.4–8.2)
PSA SERPL-MCNC: <0.1 NG/ML (ref 0–4)
RBC # BLD AUTO: 3.65 MILLION/UL (ref 3.88–5.62)
SODIUM SERPL-SCNC: 139 MMOL/L (ref 136–145)
TRIGL SERPL-MCNC: 64 MG/DL
WBC # BLD AUTO: 8.36 THOUSAND/UL (ref 4.31–10.16)

## 2022-05-19 PROCEDURE — 85025 COMPLETE CBC W/AUTO DIFF WBC: CPT

## 2022-05-19 PROCEDURE — 80061 LIPID PANEL: CPT

## 2022-05-19 PROCEDURE — 99213 OFFICE O/P EST LOW 20 MIN: CPT | Performed by: PHYSICIAN ASSISTANT

## 2022-05-19 PROCEDURE — 80053 COMPREHEN METABOLIC PANEL: CPT

## 2022-05-19 PROCEDURE — 84153 ASSAY OF PSA TOTAL: CPT

## 2022-05-19 PROCEDURE — 83036 HEMOGLOBIN GLYCOSYLATED A1C: CPT

## 2022-05-19 PROCEDURE — 36415 COLL VENOUS BLD VENIPUNCTURE: CPT

## 2022-05-19 RX ORDER — ALBUTEROL SULFATE 90 UG/1
2 AEROSOL, METERED RESPIRATORY (INHALATION) EVERY 6 HOURS PRN
Qty: 8 G | Refills: 1 | Status: SHIPPED | OUTPATIENT
Start: 2022-05-19

## 2022-05-19 NOTE — PROGRESS NOTES
Assessment/Plan:   Patient Instructions   Overall pulmonary exam is improved  You still have some scattered wheezing on inspiration  Will start albuterol inhaler 1-2 puffs 3 times a day  Would like you to use this for at least 7 days, and may be until ICU on the 31st   No other medication changes  Quality Measures:   Depression Screening and Follow-up Plan: Patient was screened for depression during today's encounter  They screened negative with a PHQ-2 score of 0  Return for Next scheduled follow up  Diagnoses and all orders for this visit:    Pneumonia of left lower lobe due to infectious organism  Comments:  Clinical diagnosis    Bronchospasm  -     albuterol (Ventolin HFA) 90 mcg/act inhaler; Inhale 2 puffs every 6 (six) hours as needed for wheezing          Subjective:      Patient ID: Sofie Rooney is a 80 y o  male  Follow-up    Patient was seen last week with diagnosis of clinically apparent pneumonia  Chest x-ray did not find any evidence of pneumonia  He has been on antibiotic and finished a Medrol pack  He is reporting he is about 90% better  He still has some coughing and some head congestion  The Claritin and Flonase nasal spray has been helping his nasal congestion head congestion and allergy symptoms  No chest pain, PND, leg edema  He felt well enough yesterday to go out and play golf        ALLERGIES:  Allergies   Allergen Reactions    Simvastatin Allergic Rhinitis       CURRENT MEDICATIONS:    Current Outpatient Medications:     albuterol (Ventolin HFA) 90 mcg/act inhaler, Inhale 2 puffs every 6 (six) hours as needed for wheezing, Disp: 8 g, Rfl: 1    carvedilol (COREG) 12 5 mg tablet, Take 18 75 mg by mouth, Disp: , Rfl:     cefuroxime (CEFTIN) 250 mg tablet, Take 1 tablet (250 mg total) by mouth every 12 (twelve) hours for 10 days, Disp: 20 tablet, Rfl: 0    cyanocobalamin 100 MCG tablet, Take 2,000 mcg by mouth, Disp: , Rfl:     ELIQUIS 5 MG, Take 5 mg by mouth 2 (two) times a day, Disp: , Rfl: 1    ferrous sulfate 325 (65 Fe) mg tablet, Take 325 mg by mouth daily with breakfast, Disp: , Rfl:     furosemide (LASIX) 20 mg tablet, Take 40 mg by mouth daily , Disp: , Rfl:     Iron-Vitamin C (IRON 100/C PO), Take by mouth, Disp: , Rfl:     leuprolide (ELIGARD) 45 MG injection, Inject 45 mg under the skin, Disp: , Rfl:     lifitegrast (XIIDRA) 5 % op solution, Apply 1 drop to eye, Disp: , Rfl:     lisinopril (ZESTRIL) 5 mg tablet, Take 5 mg by mouth, Disp: , Rfl:     Multiple Vitamin (MULTIVITAMIN PO), Take by mouth, Disp: , Rfl:     Omega-3 Fatty Acids (FISH OIL PO), Take 2,400 mg by mouth, Disp: , Rfl:     rosuvastatin (CRESTOR) 20 MG tablet, TAKE 1 TABLET DAILY, Disp: 90 tablet, Rfl: 3    potassium chloride (KLOR-CON) 20 mEq packet, Take 20 mEq by mouth daily  (Patient not taking: No sig reported), Disp: , Rfl:     ACTIVE PROBLEM LIST:  Patient Active Problem List   Diagnosis    Chronic systolic heart failure (HCC)    Essential hypertension    H/O aortic valve replacement with tissue graft    Mixed hyperlipidemia    Impaired fasting glucose    Iron deficiency anemia    Coronary artery disease involving native coronary artery of native heart without angina pectoris    PAF (paroxysmal atrial fibrillation) (HCC)    Transient amnesia    Thrombocytopenia (HCC)    Osteoporosis       PAST MEDICAL HISTORY:  Past Medical History:   Diagnosis Date    Arthritis     HIP    [facility] [address] [facility]     Cancer of prostate ([facility] [address] ) 1998    removed in [facility] Dr Jenaro Ortiz    Heart attack ([facility] [address] )     High blood pressure     High cholesterol     Irregular heart beat     has ICD Pacemaker and Medication       PAST SURGICAL HISTORY:  Past Surgical History:   Procedure Laterality Date    AORTIC VALVE REPLACEMENT      CARDIAC DEFIBRILLATOR PLACEMENT      pulse generator    CATARACT EXTRACTION, BILATERAL Bilateral     HIP SURGERY Bilateral     PROSTATE CANCER GENE 3(PCA3) (HISTORICAL)      removed 1998 Urology Dr Basilio Wong,     PROSTATECTOMY         FAMILY HISTORY:  Family History   Problem Relation Age of Onset    Emphysema Mother     Substance Abuse Other     Cerebral aneurysm Brother     Dementia Neg Hx        SOCIAL HISTORY:  Social History     Socioeconomic History    Marital status: /Civil Union     Spouse name: Not on file    Number of children: Not on file    Years of education: Not on file    Highest education level: Not on file   Occupational History    Occupation: retired    Tobacco Use    Smoking status: Never Smoker    Smokeless tobacco: Never Used   Vaping Use    Vaping Use: Never used   Substance and Sexual Activity    Alcohol use: Yes     Comment: social    Drug use: No    Sexual activity: Not Currently     Comment: denied: history of high risk sexual behavior   Other Topics Concern    Not on file   Social History Narrative        2 children 3 step children    Retired-/Material Handling    No teeth    Hobbies-golfing     Social Determinants of Health     Financial Resource Strain: Not on file   Food Insecurity: Not on file   Transportation Needs: Not on file   Physical Activity: Not on file   Stress: Not on file   Social Connections: Not on file   Intimate Partner Violence: Not on file   Housing Stability: Not on file       Review of Systems   Constitutional: Negative for activity change, chills, fatigue and fever  HENT: Negative for congestion  Eyes: Negative for discharge  Respiratory: Positive for cough, shortness of breath and wheezing  Negative for chest tightness  Cardiovascular: Negative for chest pain, palpitations and leg swelling  Gastrointestinal: Negative for abdominal pain  Genitourinary: Negative for difficulty urinating  Musculoskeletal: Negative for arthralgias and myalgias  Skin: Negative for rash  Allergic/Immunologic: Negative for immunocompromised state  Neurological: Negative for dizziness, syncope, weakness, light-headedness and headaches  Hematological: Negative for adenopathy  Does not bruise/bleed easily  Psychiatric/Behavioral: Negative for dysphoric mood  The patient is not nervous/anxious  Objective:  Vitals:    05/19/22 0942   BP: 120/60   BP Location: Left arm   Patient Position: Sitting   Cuff Size: Standard   Pulse: 83   Resp: 14   Temp: 98 6 °F (37 °C)   TempSrc: Tympanic   SpO2: 99%   Weight: 88 kg (194 lb)   Height: 5' 7" (1 702 m)     Body mass index is 30 38 kg/m²  Physical Exam  Vitals and nursing note reviewed  Constitutional:       General: He is not in acute distress  Appearance: He is well-developed  HENT:      Head: Normocephalic and atraumatic  Eyes:      Extraocular Movements: Extraocular movements intact  Pupils: Pupils are equal, round, and reactive to light  Neck:      Thyroid: No thyromegaly  Vascular: No carotid bruit or JVD  Cardiovascular:      Rate and Rhythm: Normal rate and regular rhythm  Heart sounds: Normal heart sounds  Pulmonary:      Effort: Pulmonary effort is normal  No respiratory distress  Comments: Inspiratory wheezes are still present in bases, with few scattered rhonchi  Overall pulmonary findings are improved  No crackles  Musculoskeletal:      Cervical back: Neck supple  Right lower leg: No edema  Left lower leg: No edema  Lymphadenopathy:      Cervical: No cervical adenopathy  Skin:     General: Skin is warm and dry  Findings: No rash  Neurological:      General: No focal deficit present  Mental Status: He is alert and oriented to person, place, and time  Mental status is at baseline  Psychiatric:         Mood and Affect: Mood normal          Behavior: Behavior normal            RESULTS:    No results found for this or any previous visit (from the past 1008 hour(s))  This note was created with voice recognition software  Phonic, grammatical and spelling errors may be present within the note as a result

## 2022-05-19 NOTE — PATIENT INSTRUCTIONS
Overall pulmonary exam is improved  You still have some scattered wheezing on inspiration  Will start albuterol inhaler 1-2 puffs 3 times a day  Would like you to use this for at least 7 days, and may be until ICU on the 31st   No other medication changes

## 2022-05-31 ENCOUNTER — OFFICE VISIT (OUTPATIENT)
Dept: INTERNAL MEDICINE CLINIC | Facility: CLINIC | Age: 87
End: 2022-05-31
Payer: MEDICARE

## 2022-05-31 VITALS
WEIGHT: 198 LBS | OXYGEN SATURATION: 97 % | SYSTOLIC BLOOD PRESSURE: 132 MMHG | DIASTOLIC BLOOD PRESSURE: 60 MMHG | TEMPERATURE: 98 F | RESPIRATION RATE: 14 BRPM | HEIGHT: 67 IN | BODY MASS INDEX: 31.08 KG/M2 | HEART RATE: 78 BPM

## 2022-05-31 DIAGNOSIS — D69.6 THROMBOCYTOPENIA (HCC): ICD-10-CM

## 2022-05-31 DIAGNOSIS — D50.9 IRON DEFICIENCY ANEMIA, UNSPECIFIED IRON DEFICIENCY ANEMIA TYPE: ICD-10-CM

## 2022-05-31 DIAGNOSIS — I10 ESSENTIAL HYPERTENSION: Primary | ICD-10-CM

## 2022-05-31 DIAGNOSIS — E78.2 MIXED HYPERLIPIDEMIA: ICD-10-CM

## 2022-05-31 DIAGNOSIS — I48.0 PAF (PAROXYSMAL ATRIAL FIBRILLATION) (HCC): ICD-10-CM

## 2022-05-31 DIAGNOSIS — R73.01 IMPAIRED FASTING GLUCOSE: ICD-10-CM

## 2022-05-31 PROCEDURE — 99214 OFFICE O/P EST MOD 30 MIN: CPT | Performed by: PHYSICIAN ASSISTANT

## 2022-05-31 NOTE — PATIENT INSTRUCTIONS
No change in current medications  Follow up with specialists as scheduled  Follow-up here in 4 months to be also Medicare annual wellness visit  Follow-up sooner as needed

## 2022-05-31 NOTE — PROGRESS NOTES
Assessment/Plan:   Patient Instructions   No change in current medications  Follow up with specialists as scheduled  Follow-up here in 4 months to be also Medicare annual wellness visit  Follow-up sooner as needed  Quality Measures:   Depression Screening and Follow-up Plan: Patient was screened for depression during today's encounter  They screened negative with a PHQ-2 score of 0  Return in about 4 months (around 9/30/2022) for Regular Visit + Medicare Annual Wellness  Diagnoses and all orders for this visit:    Essential hypertension  -     Comprehensive metabolic panel; Future    PAF (paroxysmal atrial fibrillation) (HCC)    Impaired fasting glucose  -     Hemoglobin A1C; Future    Mixed hyperlipidemia  -     Lipid panel; Future    Iron deficiency anemia, unspecified iron deficiency anemia type  -     CBC and differential; Future    Thrombocytopenia (HCC)          Subjective:      Patient ID: Florentino Sloan is a 80 y o  male  Follow-up, labs reviewed with patient    Hypertension:  Good control on current medication  Estefani cp, palp, sob, HA, dizziness, diaphoresis, syncope, visual disturbance  Impaired fasting glucose:  A1c stable at 5 8 with fasting sugar 108  Denies polyuria, polyphagia, polydipsia  Paroxysmal atrial fibrillation:  Follows with Cardiology  On carvedilol, and Eliquis  Hyperlipidemia:  Well controlled on rosuvastatin 20 mg daily  Denies side effects  Iron deficiency anemia:  Stable  On iron replacement  Thrombocytopenia:  Stable        ALLERGIES:  Allergies   Allergen Reactions    Simvastatin Allergic Rhinitis       CURRENT MEDICATIONS:    Current Outpatient Medications:     albuterol (Ventolin HFA) 90 mcg/act inhaler, Inhale 2 puffs every 6 (six) hours as needed for wheezing, Disp: 8 g, Rfl: 1    carvedilol (COREG) 12 5 mg tablet, Take 18 75 mg by mouth, Disp: , Rfl:     cyanocobalamin 100 MCG tablet, Take 2,000 mcg by mouth, Disp: , Rfl:    ELIQUIS 5 MG, Take 5 mg by mouth 2 (two) times a day, Disp: , Rfl: 1    ferrous sulfate 325 (65 Fe) mg tablet, Take 325 mg by mouth daily with breakfast, Disp: , Rfl:     furosemide (LASIX) 20 mg tablet, Take 40 mg by mouth daily , Disp: , Rfl:     Iron-Vitamin C (IRON 100/C PO), Take by mouth, Disp: , Rfl:     leuprolide (ELIGARD) 45 MG injection, Inject 45 mg under the skin, Disp: , Rfl:     lifitegrast (XIIDRA) 5 % op solution, Apply 1 drop to eye, Disp: , Rfl:     lisinopril (ZESTRIL) 5 mg tablet, Take 5 mg by mouth, Disp: , Rfl:     Multiple Vitamin (MULTIVITAMIN PO), Take by mouth, Disp: , Rfl:     Omega-3 Fatty Acids (FISH OIL PO), Take 2,400 mg by mouth, Disp: , Rfl:     rosuvastatin (CRESTOR) 20 MG tablet, TAKE 1 TABLET DAILY, Disp: 90 tablet, Rfl: 3    potassium chloride (KLOR-CON) 20 mEq packet, Take 20 mEq by mouth daily  (Patient not taking: No sig reported), Disp: , Rfl:     ACTIVE PROBLEM LIST:  Patient Active Problem List   Diagnosis    Chronic systolic heart failure (HCC)    Essential hypertension    H/O aortic valve replacement with tissue graft    Mixed hyperlipidemia    Impaired fasting glucose    Iron deficiency anemia    Coronary artery disease involving native coronary artery of native heart without angina pectoris    PAF (paroxysmal atrial fibrillation) (HCC)    Transient amnesia    Thrombocytopenia (HCC)    Osteoporosis       PAST MEDICAL HISTORY:  Past Medical History:   Diagnosis Date    Arthritis     HIP    Cancer ([facility] [address] )     Cancer of prostate ([facility] [address] ) 1998    removed in [facility]    Heart attack ([facility] [address] )     High blood pressure     High cholesterol     Irregular heart beat     has ICD Pacemaker and Medication       PAST SURGICAL HISTORY:  Past Surgical History:   Procedure Laterality Date    AORTIC VALVE REPLACEMENT      CARDIAC DEFIBRILLATOR PLACEMENT      pulse generator    CATARACT EXTRACTION, BILATERAL Bilateral     HIP SURGERY Bilateral     PROSTATE CANCER GENE 3(PCA3) (HISTORICAL)      removed 1998 Urology Dr Ruby Hinds,     PROSTATECTOMY         FAMILY HISTORY:  Family History   Problem Relation Age of Onset    Emphysema Mother     Substance Abuse Other     Cerebral aneurysm Brother     Dementia Neg Hx        SOCIAL HISTORY:  Social History     Socioeconomic History    Marital status: /Civil Union     Spouse name: Not on file    Number of children: Not on file    Years of education: Not on file    Highest education level: Not on file   Occupational History    Occupation: retired    Tobacco Use    Smoking status: Never Smoker    Smokeless tobacco: Never Used   Vaping Use    Vaping Use: Never used   Substance and Sexual Activity    Alcohol use: Yes     Comment: social    Drug use: No    Sexual activity: Not Currently     Comment: denied: history of high risk sexual behavior   Other Topics Concern    Not on file   Social History Narrative        2 children 3 step children    Retired-/Material Handling    No teeth    Hobbies-golfing     Social Determinants of Health     Financial Resource Strain: Not on file   Food Insecurity: Not on file   Transportation Needs: Not on file   Physical Activity: Not on file   Stress: Not on file   Social Connections: Not on file   Intimate Partner Violence: Not on file   Housing Stability: Not on file       Review of Systems   Constitutional: Negative for activity change, chills, fatigue and fever  HENT: Negative for congestion  Eyes: Negative for discharge  Respiratory: Negative for cough, chest tightness and shortness of breath  Cardiovascular: Negative for chest pain, palpitations and leg swelling  Gastrointestinal: Negative for abdominal pain, blood in stool, constipation, diarrhea, nausea and vomiting  Endocrine: Negative for polydipsia, polyphagia and polyuria  Genitourinary: Negative for difficulty urinating     Musculoskeletal: Negative for arthralgias and myalgias  Skin: Negative for rash  Allergic/Immunologic: Negative for immunocompromised state  Neurological: Negative for dizziness, syncope, weakness, light-headedness and headaches  Hematological: Negative for adenopathy  Does not bruise/bleed easily  Psychiatric/Behavioral: Negative for dysphoric mood, sleep disturbance and suicidal ideas  The patient is not nervous/anxious  Objective:  Vitals:    05/31/22 1021   BP: 132/60   BP Location: Right arm   Patient Position: Sitting   Cuff Size: Standard   Pulse: 78   Resp: 14   Temp: 98 °F (36 7 °C)   TempSrc: Tympanic   SpO2: 97%   Weight: 89 8 kg (198 lb)   Height: 5' 7" (1 702 m)     Body mass index is 31 01 kg/m²  Physical Exam  Vitals and nursing note reviewed  Constitutional:       General: He is not in acute distress  Appearance: He is well-developed  HENT:      Head: Normocephalic and atraumatic  Eyes:      Extraocular Movements: Extraocular movements intact  Pupils: Pupils are equal, round, and reactive to light  Neck:      Thyroid: No thyromegaly  Vascular: No carotid bruit or JVD  Cardiovascular:      Rate and Rhythm: Normal rate and regular rhythm  Heart sounds: Murmur ( 2/6 systolic murmur best audible in the aortic area) heard  Pulmonary:      Effort: Pulmonary effort is normal  No respiratory distress  Breath sounds: Normal breath sounds  Musculoskeletal:      Cervical back: Neck supple  Right lower leg: Edema ( 1+ +) present  Left lower leg: Edema ( 1+ +) present  Lymphadenopathy:      Cervical: No cervical adenopathy  Skin:     General: Skin is warm and dry  Findings: No rash  Neurological:      General: No focal deficit present  Mental Status: He is alert and oriented to person, place, and time  Mental status is at baseline     Psychiatric:         Mood and Affect: Mood normal          Behavior: Behavior normal  RESULTS:    Recent Results (from the past 1008 hour(s))   CBC and differential    Collection Time: 05/19/22  8:00 AM   Result Value Ref Range    WBC 8 36 4 31 - 10 16 Thousand/uL    RBC 3 65 (L) 3 88 - 5 62 Million/uL    Hemoglobin 11 5 (L) 12 0 - 17 0 g/dL    Hematocrit 33 8 (L) 36 5 - 49 3 %    MCV 93 82 - 98 fL    MCH 31 5 26 8 - 34 3 pg    MCHC 34 0 31 4 - 37 4 g/dL    RDW 13 2 11 6 - 15 1 %    MPV 10 1 8 9 - 12 7 fL    Platelets 132 (L) 122 - 390 Thousands/uL    nRBC 0 /100 WBCs    Neutrophils Relative 63 43 - 75 %    Immat GRANS % 0 0 - 2 %    Lymphocytes Relative 25 14 - 44 %    Monocytes Relative 9 4 - 12 %    Eosinophils Relative 2 0 - 6 %    Basophils Relative 1 0 - 1 %    Neutrophils Absolute 5 24 1 85 - 7 62 Thousands/µL    Immature Grans Absolute 0 03 0 00 - 0 20 Thousand/uL    Lymphocytes Absolute 2 08 0 60 - 4 47 Thousands/µL    Monocytes Absolute 0 76 0 17 - 1 22 Thousand/µL    Eosinophils Absolute 0 20 0 00 - 0 61 Thousand/µL    Basophils Absolute 0 05 0 00 - 0 10 Thousands/µL   Comprehensive metabolic panel    Collection Time: 05/19/22  8:00 AM   Result Value Ref Range    Sodium 139 136 - 145 mmol/L    Potassium 4 5 3 5 - 5 3 mmol/L    Chloride 108 100 - 108 mmol/L    CO2 26 21 - 32 mmol/L    ANION GAP 5 4 - 13 mmol/L    BUN 19 5 - 25 mg/dL    Creatinine 0 79 0 60 - 1 30 mg/dL    Glucose, Fasting 108 (H) 65 - 99 mg/dL    Calcium 8 5 8 3 - 10 1 mg/dL    Corrected Calcium 9 1 8 3 - 10 1 mg/dL    AST 56 (H) 5 - 45 U/L    ALT 93 (H) 12 - 78 U/L    Alkaline Phosphatase 48 46 - 116 U/L    Total Protein 6 4 6 4 - 8 2 g/dL    Albumin 3 3 (L) 3 5 - 5 0 g/dL    Total Bilirubin 0 63 0 20 - 1 00 mg/dL    eGFR 80 ml/min/1 73sq m   Lipid panel    Collection Time: 05/19/22  8:00 AM   Result Value Ref Range    Cholesterol 131 See Comment mg/dL    Triglycerides 64 See Comment mg/dL    HDL, Direct 64 >=40 mg/dL    LDL Calculated 54 0 - 100 mg/dL    Non-HDL-Chol (CHOL-HDL) 67 mg/dl   Hemoglobin A1C    Collection Time: 05/19/22  8:00 AM   Result Value Ref Range    Hemoglobin A1C 5 8 (H) Normal 3 8-5 6%; PreDiabetic 5 7-6 4%; Diabetic >=6 5%; Glycemic control for adults with diabetes <7 0% %     mg/dl   PSA Total, Diagnostic    Collection Time: 05/19/22  8:00 AM   Result Value Ref Range    PSA, Diagnostic <0 1 0 0 - 4 0 ng/mL       This note was created with voice recognition software  Phonic, grammatical and spelling errors may be present within the note as a result

## 2022-06-07 ENCOUNTER — TELEPHONE (OUTPATIENT)
Dept: INTERNAL MEDICINE CLINIC | Facility: CLINIC | Age: 87
End: 2022-06-07

## 2022-06-07 DIAGNOSIS — M54.16 LUMBAR BACK PAIN WITH RADICULOPATHY AFFECTING LOWER EXTREMITY: Primary | ICD-10-CM

## 2022-06-07 NOTE — TELEPHONE ENCOUNTER
Patient said that he is experiencing pain from his buttock radiating down the back of hi thigh but does NOT go past the knee  Patient was hoping that may be PT would work and wanted to know if you could put in an order for PT? Please advise    Please notify patient

## 2022-06-13 ENCOUNTER — EVALUATION (OUTPATIENT)
Dept: PHYSICAL THERAPY | Facility: CLINIC | Age: 87
End: 2022-06-13
Payer: MEDICARE

## 2022-06-13 DIAGNOSIS — M54.16 LUMBAR BACK PAIN WITH RADICULOPATHY AFFECTING LOWER EXTREMITY: Primary | ICD-10-CM

## 2022-06-13 PROCEDURE — 97161 PT EVAL LOW COMPLEX 20 MIN: CPT

## 2022-06-13 NOTE — PROGRESS NOTES
PT Evaluation     Today's date: 2022  Patient name: Simuel Kocher  : 1934  MRN: [id number]  Referring provider: Irasema Hartley PA-C  Dx:   Encounter Diagnosis     ICD-10-CM    1  Lumbar back pain with radiculopathy affecting lower extremity  M54 16 Ambulatory Referral to Physical Therapy       Start Time: 1015  Stop Time: 1115  Total time in clinic (min): 60 minutes    Assessment  Assessment details: Simuel Kocher is a a pleasant 80 y o  male who presents today with pain, decreased sensation, ambulatory dysfunction and balance dysfunction  The patient's clinical presentation is consistent with his diagnosis of low back pain  Bryson complains of aching, sharp and throbbing pain in the left back ranging from 0/10 to 10/10  Pain is exacerbated by activity and made better by rest     The patient demonstrates within functional limits strength during resisted muscle testing  Pt ROM is moderately decreased with pain at end ranges  The patient has difficulty with ambulation, leisure, sleeping and athletics  Patient will benefit from skilled physical therapy, including therapeutic exercise, stretching, balance training, manual therapy and modalities prn to improve their level of function, to increase overall quality of life, and to address his impairments  Impairments: abnormal gait, abnormal muscle firing, abnormal or restricted ROM, abnormal movement, activity intolerance, impaired physical strength, lacks appropriate home exercise program, pain with function, weight-bearing intolerance, poor posture  and poor body mechanics  Understanding of Dx/Px/POC: excellent  Goals  ST  Independent with HEP in 2 weeks  2  Pt will have verbal report of improvement in symptoms by >/=25% in 2 weeks  3  Decrease pain to 0/10 at it's worst   4  Increase strength by 1/2 grade in all deficient planes  To be achieved by D/C   LT  Pt will improve FOTO score by >/= goal score points in 6 weeks    2  Pt will improve FOTO score to >/= goal score by visit # 12   3  Pt will be able to perform usual household chores with little to no difficulty  4  Pt will be able to walk a few blocks with little to no difficulty  5  Pt will be able to climb stairs with little to no difficulty  Plan  Patient would benefit from: skilled PT  Planned modality interventions: cryotherapy, TENS, thermotherapy: hydrocollator packs and unattended electrical stimulation  Planned therapy interventions: abdominal trunk stabilization, activity modification, ADL retraining, ADL training, behavior modification, body mechanics training, breathing training, functional ROM exercises, home exercise program, IADL retraining, joint mobilization, manual therapy, massage, motor coordination training, neuromuscular re-education, patient education, postural training, self care, strengthening, stretching, therapeutic activities, therapeutic exercise and transfer training  Frequency: 2x week (2-3x week)  Duration in weeks: 12  Plan of Care beginning date: 2022  Plan of Care expiration date: 2022  Treatment plan discussed with: patient        Subjective Evaluation    History of Present Illness  Mechanism of injury: Lavon Montelongo presents today with complaints of low back pain and sciatica that began about a month ago  Mechanism of injury was atraumatic  Pt has no recent imaging for this problem  The patient also reports decreased balance, decreased endurance, diminished sensation and difficulty with function  Relevant PMH includes Prostate CA, BL СЕРГЕЙ, Pacemaker    Pain  Current pain ratin  At best pain ratin  At worst pain rating: 10  Quality: throbbing, radiating and sharp  Relieving factors: change in position  Aggravating factors: walking  Progression: worsening    Patient Goals  Patient goals for therapy: decreased pain, increased strength, return to sport/leisure activities, independence with ADLs/IADLs, improved balance and increased motion          Objective     Concurrent Complaints  Negative for night pain, disturbed sleep, bladder dysfunction, bowel dysfunction and saddle (S4) numbness    Tenderness     Additional Tenderness Details  Denies any TTP    Neurological Testing     Sensation     Lumbar   Left   Intact: light touch    Right   Intact: light touch    Active Range of Motion     Lumbar   Flexion:  Restriction level: minimal  Extension:  Restriction level: moderate  Left lateral flexion:  Restriction level: minimal  Right lateral flexion:  Restriction level: minimal  Left rotation:  Restriction level: minimal  Right rotation:  Restriction level: minimal    Strength/Myotome Testing     Lumbar   Left   Normal strength    Right   Normal strength    Tests     Lumbar     Left   Negative passive SLR and slump test      Right   Negative passive SLR and slump test      Left Pelvic Girdle/Sacrum   Negative: active SLR test      Right Pelvic Girdle/Sacrum   Negative: active SLR test      General Comments:    Lower quarter screen   Hips: unremarkable  Knees: unremarkable  Foot/ankle: unremarkable      Flowsheet Rows    Flowsheet Row Most Recent Value   PT/OT G-Codes    Current Score 54   Projected Score 68             Precautions: BL СЕРГЕЙ, Pacemaker, Prostate CA,    Access Code: [email or web address]  URL: [email or web address]  Date: 06/13/2022  Prepared by: Jose Miguel Cross       Manuals 6/13            IASTM Lumbar             PA mobs                                       Neuro Re-Ed             PPT              TA march             TA bridge             TA BKFO             TA deadbug             TA LTR                          Ther Ex             Nustep             Repeated flexion on PB 30x            DKTC 10x10"            SKTC 10x10"                         Piriformis str 10x10"            HS str 10x10"            LTR             Ther Activity             TG             FSU             Suitcase carry             [place] carry             Gait Training                                       Modalities             IP             TENS

## 2022-06-16 ENCOUNTER — OFFICE VISIT (OUTPATIENT)
Dept: PHYSICAL THERAPY | Facility: CLINIC | Age: 87
End: 2022-06-16
Payer: MEDICARE

## 2022-06-16 DIAGNOSIS — M54.16 LUMBAR BACK PAIN WITH RADICULOPATHY AFFECTING LOWER EXTREMITY: Primary | ICD-10-CM

## 2022-06-16 PROCEDURE — 97112 NEUROMUSCULAR REEDUCATION: CPT

## 2022-06-16 PROCEDURE — 97110 THERAPEUTIC EXERCISES: CPT

## 2022-06-16 NOTE — PROGRESS NOTES
Daily Note     Today's date: 2022  Patient name: Wen Urrutia  : 1934  MRN: [id number]  Referring provider: Sohail Dominguez PA-C  Dx:   Encounter Diagnosis     ICD-10-CM    1  Lumbar back pain with radiculopathy affecting lower extremity  M54 16                   Subjective: Reports that the stretches seemde to help at his evaluation  Notes that he also purchased a new mattress which made a big difference in how his back feels first thing in the morning  Objective: See treatment diary below      Assessment: Initiated treatment session as outlined below; tolerated treatment well  Performed and reviewed exercises provided to patient to ensure proper exercise completion  Patient with significant difficulty completing bridges as noted by minimal gluteal clearance off plinth  Patient demonstrated fatigue post treatment, exhibited good technique with therapeutic exercises and would benefit from continued PT      Plan: Continue per plan of care  Precautions: BL СЕРГЕЙ, Pacemaker, Prostate CA,    Access Code: [email or web address]  URL: [email or web address]  Date: 2022  Prepared by: Leandro Mendoza       Manuals            IASTM Lumbar             PA mobs                                       Neuro Re-Ed             PPT              TA march  2x10           TA bridge  2x10           TA BKFO             TA deadbug             TA LTR                          Ther Ex             Nustep  L2 10'           Repeated flexion on PB 30x 3x10           DKTC 10x10" 10x10"with strap           SKTC 10x10"            Piriformis str 10x10" 3x30" B/L           HS str 10x10" 3x30" B/L at step           LTR             Ther Activity             TG             FSU             Suitcase carry             [place] carry             Gait Training                                       Modalities             IP             TENS

## 2022-06-21 ENCOUNTER — OFFICE VISIT (OUTPATIENT)
Dept: PHYSICAL THERAPY | Facility: CLINIC | Age: 87
End: 2022-06-21
Payer: MEDICARE

## 2022-06-21 DIAGNOSIS — M54.16 LUMBAR BACK PAIN WITH RADICULOPATHY AFFECTING LOWER EXTREMITY: Primary | ICD-10-CM

## 2022-06-21 PROCEDURE — 97112 NEUROMUSCULAR REEDUCATION: CPT

## 2022-06-21 PROCEDURE — 97110 THERAPEUTIC EXERCISES: CPT

## 2022-06-21 NOTE — PROGRESS NOTES
Daily Note     Today's date: 2022  Patient name: Alonso Ruvalcaba  : 1934  MRN: [id number]  Referring provider: Emily Tran PA-C  Dx:   Encounter Diagnosis     ICD-10-CM    1  Lumbar back pain with radiculopathy affecting lower extremity  M54 16                   Subjective: Pt noted feeling better after last treatment session  Pt noted that he has been using his bike at home as well just got this past   Pt noted that he has kylie sleeping with a pillow under his knees while in supine and between his knees while in R s/L and it seems to be helping with the pain as well  Objective: See treatment diary below      Assessment: Continued with treatment session, minimal progressions this visit due to focus on form and technique  Tolerated treatment fairly well  Patient exhibited good technique with therapeutic exercises and would benefit from continued PT to decrease LBP, decrease tightness along with activate TA core stabilization  DOMS reviewed and acknowledged by patient may have 24 to 48 hours of muscle soreness following treatment session     S/P treatment session patient noted feeling good and no significant changes noted  Plan: Continue per plan of care  progress as patient is able  Precautions: BL СЕРГЕЙ, Pacemaker, Prostate CA, [place]- hearing aids  PILLOW LONG WAY WHILE IN SUPINE  Whistle in ears with hearing aids  Access Code: [email or web address]  URL: [email or web address]  Date: 2022  Prepared by: Cesilia Klein       Manuals           IASTM Lumbar   D/C if patient on blood thinners           JAMES tolbert                                       Neuro Re-Ed             PPT    NV          TA march  2x10 2x 10 ea   Alt           TA bridge  2x10 2x 10 3" hold           TA BKFO             TA deadbug                                       Ther Ex             Nustep  L2 10' L2 10 min  B/L UE at 11           Repeated flexion on PB 30x 3x10           DKTC 10x10" 10x10"with strap 10" x 10 with strap           SKTC 10x10"            Piriformis str 10x10" 3x30" B/L 3x 30" ea  HS str standing at step  10x10" 3x30" B/L at step 3x 30" ea   B/l Step           LTR   10" x 10                                                               Ther Activity             TG squats              FSU             Suitcase carry - light weights to start              [place] carry  - light weights to start                                                                  Gait Training                                       Modalities             IP             TENS

## 2022-06-23 ENCOUNTER — OFFICE VISIT (OUTPATIENT)
Dept: PHYSICAL THERAPY | Facility: CLINIC | Age: 87
End: 2022-06-23
Payer: MEDICARE

## 2022-06-23 DIAGNOSIS — M54.16 LUMBAR BACK PAIN WITH RADICULOPATHY AFFECTING LOWER EXTREMITY: Primary | ICD-10-CM

## 2022-06-23 PROCEDURE — 97112 NEUROMUSCULAR REEDUCATION: CPT

## 2022-06-23 PROCEDURE — 97110 THERAPEUTIC EXERCISES: CPT

## 2022-06-23 NOTE — PROGRESS NOTES
Daily Note     Today's date: 2022  Patient name: Maren Encarnacion  : 1934  MRN: [id number]  Referring provider: Isaias Sharp PA-C  Dx:   Encounter Diagnosis     ICD-10-CM    1  Lumbar back pain with radiculopathy affecting lower extremity  M54 16                   Subjective: Pt noted that he felt great after last treatment session  Pt noted that his LE tingling  Has been getting better and not happening as much  Objective: See treatment diary below      Assessment: Continued with treatment,session overall patient able to complete all exercises  Pt noted some discomfort in the small of his back while performing bridges but was able to continue to perform  Advised patient to stop if he has any increased symptoms Tolerated treatment fairly well   Patient exhibited good technique with therapeutic exercises and would benefit from continued PT  S/p treatment session no changes noted  Plan: Continue per plan of care  Precautions: BL СЕРГЕЙ, Pacemaker, Prostate CA, [place]- hearing aids  PILLOW LONG WAY WHILE IN SUPINE  Whistle in ears with hearing aids  Access Code: [email or web address]  URL: [email or web address]  Date: 2022  Prepared by: Larry Villa       Manuals          IASTM Lumbar   D/C if patient on blood thinners           JAMES tolbert                                       Neuro Re-Ed             PPT    NV 10x VC's         TA march  2x10 2x 10 ea  Alt  2x 10 ea  TA bridge  2x10 2x 10 3" hold  2x 10 ea  TA BKFO    2x 10 ea  TA deadbug                                       Ther Ex             Nustep  L2 10' L2 10 min  B/L UE at 11  L5 3 min, L 2 7 minutes with UE at 11          Repeated flexion on PB 30x 3x10           DKTC 10x10" 10x10"with strap 10" x 10 with strap           SKTC 10x10"            Piriformis str 10x10" 3x30" B/L 3x 30" ea  3x 30" ea  HS str standing at step  10x10" 3x30" B/L at step 3x 30" ea   B/l Step  3x 30" LTR   10" x 10  10" x 10                                                              Ther Activity             TG squats              FSU             Suitcase carry - light weights to start              [place] carry  - light weights to start                                                                  Gait Training                                       Modalities             IP             TENS

## 2022-06-28 ENCOUNTER — OFFICE VISIT (OUTPATIENT)
Dept: PHYSICAL THERAPY | Facility: CLINIC | Age: 87
End: 2022-06-28
Payer: MEDICARE

## 2022-06-28 DIAGNOSIS — M54.16 LUMBAR BACK PAIN WITH RADICULOPATHY AFFECTING LOWER EXTREMITY: Primary | ICD-10-CM

## 2022-06-28 PROCEDURE — 97110 THERAPEUTIC EXERCISES: CPT

## 2022-06-28 PROCEDURE — 97112 NEUROMUSCULAR REEDUCATION: CPT

## 2022-06-28 NOTE — PROGRESS NOTES
Daily Note     Today's date: 2022  Patient name: Rosalind Cassidy  : 1934  MRN: [id number]  Referring provider: Joshua Cruz PA-C  Dx:   Encounter Diagnosis     ICD-10-CM    1  Lumbar back pain with radiculopathy affecting lower extremity  M54 16        Start Time: 1045  Stop Time: 1130  Total time in clinic (min): 45 minutes    Subjective:  Pt noted that he has been feeling good and noted that he did go golfing yesterday  Objective: See treatment diary below      Assessment: Continued with treatment session, Overall patient able to complete all exercises with no increase in pain  Tolerated treatment fair  Patient exhibited good technique with therapeutic exercises and would benefit from continued PT  Plan: Continue per plan of care  Precautions: BL СЕРГЕЙ, Pacemaker, Prostate CA, [place]- hearing aids  PILLOW LONG WAY WHILE IN SUPINE  Whistle in ears with hearing aids  Access Code: [email or web address]  URL: [email or web address]  Date: 2022  Prepared by: Kamila Ward       Manuals         IASTM Lumbar   D/C if patient on blood thinners           JAMES tolbert                                       Neuro Re-Ed             PPT    NV 10x VC's 2x 10         TA march  2x10 2x 10 ea  Alt  2x 10 ea  3x 10         TA bridge  2x10 2x 10 3" hold  2x 10 ea  TA BKFO    2x 10 ea  3x 10         TA deadbug                                       Ther Ex             Nustep  L2 10' L2 10 min  B/L UE at 11  L5 3 min, L 2 7 minutes with UE at 11  L4 10 min         Repeated flexion on PB 30x 3x10           DKTC 10x10" 10x10"with strap 10" x 10 with strap           SKTC 10x10"            Piriformis str 10x10" 3x30" B/L 3x 30" ea  3x 30" ea  3x 30"         HS str standing at step  10x10" 3x30" B/L at step 3x 30" ea   B/l Step  3x 30"          LTR   10" x 10  10" x 10  10"x 10                                                             Ther Activity             TG squats FSU             Suitcase carry - light weights to start              [place] carry  - light weights to start                                                                  Gait Training                                       Modalities             IP             TENS

## 2022-06-30 ENCOUNTER — OFFICE VISIT (OUTPATIENT)
Dept: PHYSICAL THERAPY | Facility: CLINIC | Age: 87
End: 2022-06-30
Payer: MEDICARE

## 2022-06-30 DIAGNOSIS — M54.16 LUMBAR BACK PAIN WITH RADICULOPATHY AFFECTING LOWER EXTREMITY: Primary | ICD-10-CM

## 2022-06-30 PROCEDURE — 97112 NEUROMUSCULAR REEDUCATION: CPT

## 2022-06-30 PROCEDURE — 97110 THERAPEUTIC EXERCISES: CPT

## 2022-06-30 NOTE — PROGRESS NOTES
Daily Note     Today's date: 2022  Patient name: Shama Gallagher  : 1934  MRN: [id number]  Referring provider: Constantine Veras PA-C  Dx:   Encounter Diagnosis     ICD-10-CM    1  Lumbar back pain with radiculopathy affecting lower extremity  M54 16                   Subjective: Pt noted feeling good but noted that he feels like he did too much yesterday  4/10 noted in bilateral buttocks and posterior thighs  Objective: See treatment diary below      Assessment: Continued with treatment session, Progressed with some TA with webslide pull downs and rows  Pt noted having some lower buttocks discomfort while performing TB exercises advised patient to have a slight bend in bilateral knees while performing and noted p! Decreased  Tolerated treatment fairly well  Patient exhibited good technique with therapeutic exercises and would benefit from continued PT to facilitate strengthening core and decrease LBP  Pt continues to demonstrate great understanding of HEP importance  Follow through noted with HEP        DOMS reviewed and acknowledged by patient may have 24 to 48 hours of muscle soreness following treatment session secondary to progressions added  Plan: Continue per plan of care  provide patient with A GTB for HEP  Precautions: BL СЕРГЕЙ, Pacemaker, Prostate CA, [place]- hearing aids  PILLOW LONG WAY and folded WHILE IN SUPINE  Whistle in ears with hearing aids  Access Code: [email or web address]  URL: [email or web address]  Date: 2022  Prepared by: Gonzalez Garcia       Manuals        IASTM Lumbar   D/C if patient on blood thinners           PA mobs                                       Neuro Re-Ed             PPT    NV 10x VC's 2x 10         TA march  2x10 2x 10 ea  Alt  2x 10 ea  3x 10         TA bridge  2x10 2x 10 3" hold  2x 10 ea  TA BKFO    2x 10 ea    3x 10         TA deadbug             webslide LM with TA       2x 10 GTB knees bent                     Ther Ex             Nustep  L2 10' L2 10 min  B/L UE at 11  L5 3 min, L 2 7 minutes with UE at 11  L4 10 min  L5 10 min with UE;'s       Repeated flexion on PB 30x 3x10           DKTC 10x10" 10x10"with strap 10" x 10 with strap           SKTC 10x10"            Piriformis str 10x10" 3x30" B/L 3x 30" ea  3x 30" ea  3x 30"  3x 30"        HS str standing at step  10x10" 3x30" B/L at step 3x 30" ea   B/l Step  3x 30"   3x 30"        LTR   10" x 10  10" x 10  10"x 10  10" x 10        Hip flexion stretch on stairs       30" x 3                                               Ther Activity             TG squats              FSU             Suitcase carry - light weights to start              [place] carry  - light weights to start                                                                  Gait Training                                       Modalities             IP             TENS

## 2022-07-05 ENCOUNTER — OFFICE VISIT (OUTPATIENT)
Dept: PHYSICAL THERAPY | Facility: CLINIC | Age: 87
End: 2022-07-05
Payer: MEDICARE

## 2022-07-05 DIAGNOSIS — M54.16 LUMBAR BACK PAIN WITH RADICULOPATHY AFFECTING LOWER EXTREMITY: Primary | ICD-10-CM

## 2022-07-05 PROCEDURE — 97110 THERAPEUTIC EXERCISES: CPT

## 2022-07-05 PROCEDURE — 97112 NEUROMUSCULAR REEDUCATION: CPT

## 2022-07-05 NOTE — PROGRESS NOTES
Daily Note     Today's date: 2022  Patient name: Meredith Davies  : 1934  MRN: [id number]  Referring provider: Marianna Banks PA-C  Dx:   Encounter Diagnosis     ICD-10-CM    1  Lumbar back pain with radiculopathy affecting lower extremity  M54 16                   Subjective: Patient report inconsistent compliance with HEP  Patient stated that the pain into his legs is no longer there  Objective: See treatment diary below    EDU on the need to perform HEP to reproduce results/maintince of progress made in the clinic, patient reports understanding  Assessment: Tolerated treatment well  Patient has good recall of TE, self stretching form required corrections  Good core activation noted with stability tasks  Fair standing posture, and required tactile cuing for parascapular activation with posterior chain strengthening  Patient reports improved overall mobility post session  Continued PT would be beneficial to improve function           Plan: Continue per plan of care  Precautions: BL СЕРГЕЙ, Pacemaker, Prostate CA, [place]- hearing aids  PILLOW LONG WAY and folded WHILE IN SUPINE  Whistle in ears with hearing aids  Access Code: [email or web address]  URL: [email or web address]  Date: 2022  Prepared by: Jose Miguel Smith       Manuals       IASTM Lumbar   D/C if patient on blood thinners           JAMES tolbert                                       Neuro Re-Ed             PPT    NV 10x VC's 2x 10   2x10      TA march  2x10 2x 10 ea  Alt  2x 10 ea  3x 10   3x10      TA bridge  2x10 2x 10 3" hold  2x 10 ea  TA BKFO    2x 10 ea    3x 10   3x10      TA deadbug             webslide LM with TA       2x 10 GTB knees bent  2x 10 GTB knees bent                    Ther Ex             Nustep  L2 10' L2 10 min  B/L UE at 11  L5 3 min, L 2 7 minutes with UE at 11  L4 10 min  L5 10 min with UE;'s L5 10 min with UE;'s      Repeated flexion on PB 30x 3x10 DKTC 10x10" 10x10"with strap 10" x 10 with strap           SKTC 10x10"            Piriformis str 10x10" 3x30" B/L 3x 30" ea  3x 30" ea  3x 30"  3x 30"  3x 30"      HS str standing at step  10x10" 3x30" B/L at step 3x 30" ea   B/l Step  3x 30"   3x 30"  3x30"      LTR   10" x 10  10" x 10  10"x 10  10" x 10  10" x 10       Hip flexion stretch on stairs       30" x 3  30" x 3                                              Ther Activity             TG squats              FSU             Suitcase carry - light weights to start              [place] carry  - light weights to start                                                                  Gait Training                                       Modalities             IP             TENS

## 2022-07-07 ENCOUNTER — OFFICE VISIT (OUTPATIENT)
Dept: PHYSICAL THERAPY | Facility: CLINIC | Age: 87
End: 2022-07-07
Payer: MEDICARE

## 2022-07-07 DIAGNOSIS — M54.16 LUMBAR BACK PAIN WITH RADICULOPATHY AFFECTING LOWER EXTREMITY: Primary | ICD-10-CM

## 2022-07-07 PROCEDURE — 97112 NEUROMUSCULAR REEDUCATION: CPT | Performed by: PHYSICAL THERAPIST

## 2022-07-07 PROCEDURE — 97110 THERAPEUTIC EXERCISES: CPT | Performed by: PHYSICAL THERAPIST

## 2022-07-07 NOTE — PROGRESS NOTES
Daily Note     Today's date: 2022  Patient name: Larisa Brar  : 1934  MRN: [id number]  Referring provider: Kendal Mabry PA-C  Dx:   Encounter Diagnosis     ICD-10-CM    1  Lumbar back pain with radiculopathy affecting lower extremity  M54 16        Start Time: 1106  Stop Time: 1145  Total time in clinic (min): 39 minutes    Subjective: Patient offers no new complaints  Objective: See treatment diary below      Assessment: Tolerated treatment well  Patient demonstrated fatigue post treatment and would benefit from continued PT  Patient reported increased fatigue and minor soreness  Patient overall continues to progress well per POC  Plan: Continue per plan of care  Progress treatment as tolerated  Precautions: BL СЕРГЕЙ, Pacemaker, Prostate CA, [place]- hearing aids  PILLOW LONG WAY and folded WHILE IN SUPINE  Whistle in ears with hearing aids  Access Code: [email or web address]  URL: [email or web address]  Date: 2022  Prepared by: Kathrine Vo       Manuals      IASTM Lumbar   D/C if patient on blood thinners           JAMES tolbert                                       Neuro Re-Ed             PPT    NV 10x VC's 2x 10   2x10      TA march  2x10 2x 10 ea  Alt  2x 10 ea  3x 10   3x10      TA bridge  2x10 2x 10 3" hold  2x 10 ea  GTB 3x10 3" ea  TA BKFO    2x 10 ea  3x 10   3x10      TA deadbug        2x10 3"     webslide LM with TA       2x 10 GTB knees bent  2x 10 GTB knees bent  2x10 GTB                   Ther Ex             Nustep  L2 10' L2 10 min  B/L UE at 11  L5 3 min, L 2 7 minutes with UE at 11  L4 10 min  L5 10 min with UE;'s L5 10 min with UE;'s L3 10 min with UEs     Repeated flexion on PB 30x 3x10           DKTC 10x10" 10x10"with strap 10" x 10 with strap           SKTC 10x10"            Piriformis str 10x10" 3x30" B/L 3x 30" ea  3x 30" ea    3x 30"  3x 30"  3x 30" 3x30"     HS str standing at step  10x10" 3x30" B/L at step 3x 30" ea   B/l Step  3x 30"   3x 30"  3x30" 3x30"     LTR   10" x 10  10" x 10  10"x 10  10" x 10  10" x 10  10"x10     Hip flexion stretch on stairs       30" x 3  30" x 3  3x30"                                            Ther Activity             TG squats              FSU             Suitcase carry - light weights to start              [place] carry  - light weights to start                                                                  Gait Training                                       Modalities             IP             TENS

## 2022-07-11 NOTE — PROGRESS NOTES
PT Evaluation     Today's date: 2022  Patient name: Amol Hernandes  : 1934  MRN: [id number]  Referring provider: Padmaja Ott PA-C  Dx:   Encounter Diagnosis     ICD-10-CM    1  Lumbar back pain with radiculopathy affecting lower extremity  M54 16        Start Time: 1115  Stop Time: 1200  Total time in clinic (min): 45 minutes    Assessment  Assessment details: Amol Hernandes is a a pleasant 80 y o  male who presents today for re-evaluation for his low back pain and leg pain  he complains of no pain at this time  The patient demonstrates within functional limits strength during resisted muscle testing, which has improved from previous evaluation  Pt ROM is minimally decreased with no pain, which has improved from previous evaluation  The patient has improved function since starting therapy and has no difficulty with movement  Hemalatha Krishnan has met all of his goals at this time  Plan of care will be discontinued following his next appointment, and patient discharged to an independent HEP  Impairments: abnormal gait, abnormal muscle firing, abnormal or restricted ROM, abnormal movement, activity intolerance, impaired physical strength, lacks appropriate home exercise program, pain with function, weight-bearing intolerance, poor posture  and poor body mechanics  Understanding of Dx/Px/POC: excellent  Goals  ST  Independent with HEP in 2 weeks  - GOAL MET  2  Pt will have verbal report of improvement in symptoms by >/=25% in 2 weeks  - GOAL MET  3  Decrease pain to 0/10 at it's worst  - GOAL MET  4  Increase strength by 1/2 grade in all deficient planes  - GOAL MET    To be achieved by D/C   LT  Pt will improve FOTO score by >/= goal score points in 6 weeks  - GOAL MET  2  Pt will improve FOTO score to >/= goal score by visit # 12  - GOAL MET  3  Pt will be able to perform usual household chores with little to no difficulty  - GOAL MET  4   Pt will be able to walk a few blocks with little to no difficulty  - GOAL MET  5  Pt will be able to climb stairs with little to no difficulty  -GOAL MET    Plan  Patient would benefit from: skilled PT  Planned modality interventions: cryotherapy, TENS, thermotherapy: hydrocollator packs and unattended electrical stimulation  Planned therapy interventions: abdominal trunk stabilization, activity modification, ADL retraining, ADL training, behavior modification, body mechanics training, breathing training, functional ROM exercises, home exercise program, IADL retraining, joint mobilization, manual therapy, massage, motor coordination training, neuromuscular re-education, patient education, postural training, self care, strengthening, stretching, therapeutic activities, therapeutic exercise and transfer training  Frequency: 2x week (2-3x week)  Duration in weeks: 12  Plan of Care beginning date: 2022  Plan of Care expiration date: 2022  Treatment plan discussed with: patient        Subjective Evaluation    History of Present Illness  Mechanism of injury: Iveth Ferrer presents today with complaints of low back pain and sciatica that began about a month ago  Mechanism of injury was atraumatic  Pt has no recent imaging for this problem  The patient also reports decreased balance, decreased endurance, diminished sensation and difficulty with function  Relevant PMH includes Prostate CA, BL СЕРГЕЙ, Pacemaker    Pain  Current pain ratin  At best pain ratin  At worst pain ratin  Quality: dull ache  Relieving factors: change in position  Progression: improved    Patient Goals  Patient goals for therapy: decreased pain, increased strength, return to sport/leisure activities, independence with ADLs/IADLs, improved balance and increased motion          Objective     Active Range of Motion     Lumbar   Flexion:  WFL  Extension:  Restriction level: minimal  Left lateral flexion:  WFL  Right lateral flexion:  WFL  Left rotation:  WFL  Right rotation: [facility]    Strength/Myotome Testing     Lumbar   Left   Normal strength    Right   Normal strength               Precautions: BL СЕРГЕЙ, Pacemaker, Prostate CA,    Access Code: [email or web address]  URL: [email or web address]  Date: 06/13/2022  Prepared by: Angelo Price         Manuals 6/13 6/16 6/21 6/23 6/28 6/30 7/5 7/7 7/12    IASTM Lumbar   D/C if patient on blood thinners           PA mobs                          Reassessment         TS    Neuro Re-Ed             PPT    NV 10x VC's 2x 10   2x10      TA march  2x10 2x 10 ea  Alt  2x 10 ea  3x 10   3x10      TA bridge  2x10 2x 10 3" hold  2x 10 ea  GTB 3x10 3" ea  TA BKFO    2x 10 ea  3x 10   3x10      TA deadbug        2x10 3"     webslide LM with TA       2x 10 GTB knees bent  2x 10 GTB knees bent  2x10 GTB                   Ther Ex             Nustep  L2 10' L2 10 min  B/L UE at 11  L5 3 min, L 2 7 minutes with UE at 11  L4 10 min  L5 10 min with UE;'s L5 10 min with UE;'s L3 10 min with UEs     Repeated flexion on PB 30x 3x10           DKTC 10x10" 10x10"with strap 10" x 10 with strap           SKTC 10x10"            Piriformis str 10x10" 3x30" B/L 3x 30" ea  3x 30" ea  3x 30"  3x 30"  3x 30" 3x30" 3x30"    HS str standing at step  10x10" 3x30" B/L at step 3x 30" ea   B/l Step  3x 30"   3x 30"  3x30" 3x30" 3x30"    LTR   10" x 10  10" x 10  10"x 10  10" x 10  10" x 10  10"x10 10x10"    Hip flexion stretch on stairs       30" x 3  30" x 3  3x30" 3x30"    Sciatic n  glide         2x20 ea                              Ther Activity             TG squats              FSU             Suitcase carry - light weights to start              [employer] carry  - light weights to start                                                                  Gait Training                                       Modalities             IP             TENS

## 2022-07-12 ENCOUNTER — EVALUATION (OUTPATIENT)
Dept: PHYSICAL THERAPY | Facility: CLINIC | Age: 87
End: 2022-07-12
Payer: MEDICARE

## 2022-07-12 DIAGNOSIS — M54.16 LUMBAR BACK PAIN WITH RADICULOPATHY AFFECTING LOWER EXTREMITY: Primary | ICD-10-CM

## 2022-07-12 PROCEDURE — 97110 THERAPEUTIC EXERCISES: CPT

## 2022-07-12 PROCEDURE — 97140 MANUAL THERAPY 1/> REGIONS: CPT

## 2022-07-14 ENCOUNTER — OFFICE VISIT (OUTPATIENT)
Dept: PHYSICAL THERAPY | Facility: CLINIC | Age: 87
End: 2022-07-14
Payer: MEDICARE

## 2022-07-14 DIAGNOSIS — M54.16 LUMBAR BACK PAIN WITH RADICULOPATHY AFFECTING LOWER EXTREMITY: Primary | ICD-10-CM

## 2022-07-14 PROCEDURE — 97110 THERAPEUTIC EXERCISES: CPT

## 2022-07-14 NOTE — PROGRESS NOTES
Daily Note    Today's date: 22  Patient name: González Vinson  : 1934  MRN: [id number]  Referring provider: Stella Mcclain PA-C  Dx:   Encounter Diagnosis     ICD-10-CM    1  Lumbar back pain with radiculopathy affecting lower extremity  M54 16        Start Time: 6354  Stop Time: 1230  Total time in clinic (min): 45 minutes      Subjective: Iveth Ferrer reports no pain today  Objective: See treatment diary below  Assessment: Bryson tolerated treatment well with minimal cuing  TE's were performed with increased resistance and increased reps  New TE's were demonstrated with proper technique, and tolerated well  Following treatment, the patient demonstrated fatigue post treatment and exhibited good technique with therapeutic exercises  Plan: [facility] today to [facility]  Episode resolved at this time  Precautions: BL СЕРГЕЙ, Pacemaker, Prostate CA,    Access Code: [email or web address]  URL: [email or web address]  Date: 2022  Prepared by: Jonathan Trevizo         Manuals    IASTM Lumbar   D/C if patient on blood thinners           PA mobs                          Reassessment         TS    Neuro Re-Ed             PPT    NV 10x VC's 2x 10   2x10      TA march  2x10 2x 10 ea  Alt  2x 10 ea  3x 10   3x10      TA bridge  2x10 2x 10 3" hold  2x 10 ea  GTB 3x10 3" ea  TA BKFO    2x 10 ea  3x 10   3x10      TA deadbug        2x10 3"     webslide LM with TA       2x 10 GTB knees bent  2x 10 GTB knees bent  2x10 GTB   2x10 GTB    Anti rotation          BTB 10x10"   Ther Ex             Nustep  L2 10' L2 10 min  B/L UE at 11  L5 3 min, L 2 7 minutes with UE at 11  L4 10 min  L5 10 min with UE;'s L5 10 min with UE;'s L3 10 min with UEs  L5 10' with UE   Repeated flexion on PB 30x 3x10           DKTC 10x10" 10x10"with strap 10" x 10 with strap           SKTC 10x10"            Piriformis str 10x10" 3x30" B/L 3x 30" ea  3x 30" ea    3x 30"  3x 30"  3x 30" 3x30" 3x30" 3x30"   HS str standing at step  10x10" 3x30" B/L at step 3x 30" ea   B/l Step  3x 30"   3x 30"  3x30" 3x30" 3x30" 3x30"   LTR   10" x 10  10" x 10  10"x 10  10" x 10  10" x 10  10"x10 10x10" 10x10"   Hip flexion stretch on stairs       30" x 3  30" x 3  3x30" 3x30" 3x30"   Sciatic n  glide         2x20 ea 2x20 ea                             Ther Activity             TG squats              FSU             Suitcase carry - light weights to start              [place] carry  - light weights to start                                                                  Gait Training                                       Modalities             IP             MICHELLE Medina, PT  7/14/2022,12:21 PM

## 2022-09-23 ENCOUNTER — APPOINTMENT (OUTPATIENT)
Dept: LAB | Facility: CLINIC | Age: 87
End: 2022-09-23
Payer: MEDICARE

## 2022-09-23 DIAGNOSIS — I10 ESSENTIAL HYPERTENSION: ICD-10-CM

## 2022-09-23 DIAGNOSIS — E78.2 MIXED HYPERLIPIDEMIA: ICD-10-CM

## 2022-09-23 DIAGNOSIS — R73.01 IMPAIRED FASTING GLUCOSE: ICD-10-CM

## 2022-09-23 DIAGNOSIS — D50.9 IRON DEFICIENCY ANEMIA, UNSPECIFIED IRON DEFICIENCY ANEMIA TYPE: ICD-10-CM

## 2022-09-23 LAB
ALBUMIN SERPL BCP-MCNC: 3.2 G/DL (ref 3.5–5)
ALP SERPL-CCNC: 48 U/L (ref 46–116)
ALT SERPL W P-5'-P-CCNC: 26 U/L (ref 12–78)
ANION GAP SERPL CALCULATED.3IONS-SCNC: 5 MMOL/L (ref 4–13)
AST SERPL W P-5'-P-CCNC: 16 U/L (ref 5–45)
BASOPHILS # BLD AUTO: 0.06 THOUSANDS/ΜL (ref 0–0.1)
BASOPHILS NFR BLD AUTO: 1 % (ref 0–1)
BILIRUB SERPL-MCNC: 0.53 MG/DL (ref 0.2–1)
BUN SERPL-MCNC: 18 MG/DL (ref 5–25)
CALCIUM ALBUM COR SERPL-MCNC: 9.9 MG/DL (ref 8.3–10.1)
CALCIUM SERPL-MCNC: 9.3 MG/DL (ref 8.3–10.1)
CHLORIDE SERPL-SCNC: 110 MMOL/L (ref 96–108)
CHOLEST SERPL-MCNC: 130 MG/DL
CO2 SERPL-SCNC: 25 MMOL/L (ref 21–32)
CREAT SERPL-MCNC: 0.86 MG/DL (ref 0.6–1.3)
EOSINOPHIL # BLD AUTO: 0.21 THOUSAND/ΜL (ref 0–0.61)
EOSINOPHIL NFR BLD AUTO: 3 % (ref 0–6)
ERYTHROCYTE [DISTWIDTH] IN BLOOD BY AUTOMATED COUNT: 13 % (ref 11.6–15.1)
GFR SERPL CREATININE-BSD FRML MDRD: 77 ML/MIN/1.73SQ M
GLUCOSE P FAST SERPL-MCNC: 100 MG/DL (ref 65–99)
HCT VFR BLD AUTO: 33.2 % (ref 36.5–49.3)
HDLC SERPL-MCNC: 61 MG/DL
HGB BLD-MCNC: 10.9 G/DL (ref 12–17)
IMM GRANULOCYTES # BLD AUTO: 0.01 THOUSAND/UL (ref 0–0.2)
IMM GRANULOCYTES NFR BLD AUTO: 0 % (ref 0–2)
LDLC SERPL CALC-MCNC: 59 MG/DL (ref 0–100)
LYMPHOCYTES # BLD AUTO: 1.64 THOUSANDS/ΜL (ref 0.6–4.47)
LYMPHOCYTES NFR BLD AUTO: 24 % (ref 14–44)
MCH RBC QN AUTO: 30.8 PG (ref 26.8–34.3)
MCHC RBC AUTO-ENTMCNC: 32.8 G/DL (ref 31.4–37.4)
MCV RBC AUTO: 94 FL (ref 82–98)
MONOCYTES # BLD AUTO: 0.74 THOUSAND/ΜL (ref 0.17–1.22)
MONOCYTES NFR BLD AUTO: 11 % (ref 4–12)
NEUTROPHILS # BLD AUTO: 4.19 THOUSANDS/ΜL (ref 1.85–7.62)
NEUTS SEG NFR BLD AUTO: 61 % (ref 43–75)
NONHDLC SERPL-MCNC: 69 MG/DL
NRBC BLD AUTO-RTO: 0 /100 WBCS
PLATELET # BLD AUTO: 123 THOUSANDS/UL (ref 149–390)
PMV BLD AUTO: 10.4 FL (ref 8.9–12.7)
POTASSIUM SERPL-SCNC: 4.5 MMOL/L (ref 3.5–5.3)
PROT SERPL-MCNC: 6.7 G/DL (ref 6.4–8.4)
RBC # BLD AUTO: 3.54 MILLION/UL (ref 3.88–5.62)
SODIUM SERPL-SCNC: 140 MMOL/L (ref 135–147)
TRIGL SERPL-MCNC: 51 MG/DL
WBC # BLD AUTO: 6.85 THOUSAND/UL (ref 4.31–10.16)

## 2022-09-23 PROCEDURE — 80061 LIPID PANEL: CPT

## 2022-09-23 PROCEDURE — 80053 COMPREHEN METABOLIC PANEL: CPT

## 2022-09-23 PROCEDURE — 83036 HEMOGLOBIN GLYCOSYLATED A1C: CPT

## 2022-09-23 PROCEDURE — 85025 COMPLETE CBC W/AUTO DIFF WBC: CPT

## 2022-09-23 PROCEDURE — 36415 COLL VENOUS BLD VENIPUNCTURE: CPT

## 2022-09-24 LAB
EST. AVERAGE GLUCOSE BLD GHB EST-MCNC: 126 MG/DL
HBA1C MFR BLD: 6 %

## 2022-10-04 ENCOUNTER — OFFICE VISIT (OUTPATIENT)
Dept: INTERNAL MEDICINE CLINIC | Facility: CLINIC | Age: 87
End: 2022-10-04
Payer: MEDICARE

## 2022-10-04 VITALS
SYSTOLIC BLOOD PRESSURE: 98 MMHG | WEIGHT: 190 LBS | DIASTOLIC BLOOD PRESSURE: 58 MMHG | OXYGEN SATURATION: 97 % | HEIGHT: 67 IN | TEMPERATURE: 98 F | RESPIRATION RATE: 16 BRPM | HEART RATE: 77 BPM | BODY MASS INDEX: 29.82 KG/M2

## 2022-10-04 DIAGNOSIS — Z13.31 DEPRESSION SCREEN: ICD-10-CM

## 2022-10-04 DIAGNOSIS — I48.0 PAF (PAROXYSMAL ATRIAL FIBRILLATION) (HCC): ICD-10-CM

## 2022-10-04 DIAGNOSIS — D69.6 THROMBOCYTOPENIA (HCC): ICD-10-CM

## 2022-10-04 DIAGNOSIS — I25.10 CORONARY ARTERY DISEASE INVOLVING NATIVE CORONARY ARTERY OF NATIVE HEART WITHOUT ANGINA PECTORIS: ICD-10-CM

## 2022-10-04 DIAGNOSIS — R73.01 IMPAIRED FASTING GLUCOSE: ICD-10-CM

## 2022-10-04 DIAGNOSIS — I10 ESSENTIAL HYPERTENSION: ICD-10-CM

## 2022-10-04 DIAGNOSIS — E78.2 MIXED HYPERLIPIDEMIA: ICD-10-CM

## 2022-10-04 DIAGNOSIS — Z23 NEED FOR INFLUENZA VACCINATION: ICD-10-CM

## 2022-10-04 DIAGNOSIS — Z00.00 ENCOUNTER FOR MEDICARE ANNUAL WELLNESS EXAM: Primary | ICD-10-CM

## 2022-10-04 PROCEDURE — 90662 IIV NO PRSV INCREASED AG IM: CPT | Performed by: PHYSICIAN ASSISTANT

## 2022-10-04 PROCEDURE — G0008 ADMIN INFLUENZA VIRUS VAC: HCPCS | Performed by: PHYSICIAN ASSISTANT

## 2022-10-04 PROCEDURE — G0439 PPPS, SUBSEQ VISIT: HCPCS | Performed by: PHYSICIAN ASSISTANT

## 2022-10-04 PROCEDURE — G0444 DEPRESSION SCREEN ANNUAL: HCPCS | Performed by: PHYSICIAN ASSISTANT

## 2022-10-04 PROCEDURE — 99214 OFFICE O/P EST MOD 30 MIN: CPT | Performed by: PHYSICIAN ASSISTANT

## 2022-10-04 NOTE — PROGRESS NOTES
Assessment and Plan:   No change in current medications  Follow up with specialists as scheduled  Can give trial of Claritin ( loratadine) 10 mg 1 daily for allergy type symptoms  Schedule follow-up in 4 months, sooner as needed  Problem List Items Addressed This Visit        Endocrine    Impaired fasting glucose    Relevant Orders    Hemoglobin A1C       Cardiovascular and Mediastinum    Essential hypertension    Relevant Orders    CBC and differential    Comprehensive metabolic panel    Coronary artery disease involving native coronary artery of native heart without angina pectoris    PAF (paroxysmal atrial fibrillation) (HCC)       Hematopoietic and Hemostatic    Thrombocytopenia (HCC)    Relevant Orders    CBC and differential       Other    Mixed hyperlipidemia      Other Visit Diagnoses     Encounter for Medicare annual wellness exam    -  Primary    Depression screen        Need for influenza vaccination        Relevant Orders    influenza vaccine, high-dose, PF 0 7 mL (FLUZONE HIGH-DOSE) (Completed)          Depression Screening and Follow-up Plan: Patient was screened for depression during today's encounter  They screened negative with a PHQ-2 score of 0  Preventive health issues were discussed with patient, and age appropriate screening tests were ordered as noted in patient's After Visit Summary  Personalized health advice and appropriate referrals for health education or preventive services given if needed, as noted in patient's After Visit Summary  History of Present Illness:     Patient presents for a Medicare Wellness Visit      Follow-up, labs reviewed with patient    Hypertension:  Well controlled on current medication  No complaint of chest pain, palpitations, shortness of breath, dizziness  Has chronic swelling of his left ankle ever since his previous left hip surgery  Edema improves when he is supine and accumulates more during the day      Hyperlipidemia:  Well controlled by labs   On rosuvastatin  Tolerating without apparent side effects  CAD/paroxysmal atrial fibrillation:  Follows with Cardiology  As above  Iron deficiency anemia /thrombocytopenia:  Labs stable  Patient states he is taking his iron supplementation every morning  No complaint of constipation  Impaired fasting glucose:  A1c 6 0 with fasting sugar of 100  Denies polyuria, polyphagia, polydipsia  Reduce intake of concentrated sweets, and reduce portion sizes of carbohydrates (rice, bread, pasta, potatoes)  Patient Care Team:  Jeanne Gibbons MD as PCP - General     Review of Systems:     Review of Systems   Constitutional: Negative for activity change, chills, fatigue and fever  HENT: Positive for congestion, postnasal drip, rhinorrhea and sneezing  Negative for sinus pressure, sinus pain and sore throat  Eyes: Positive for itching  Negative for discharge  Respiratory: Negative for cough, chest tightness and shortness of breath  Cardiovascular: Positive for leg swelling  Negative for chest pain and palpitations  Gastrointestinal: Negative for abdominal pain, blood in stool, constipation, diarrhea, nausea and vomiting  Endocrine: Negative for polydipsia, polyphagia and polyuria  Genitourinary: Negative for difficulty urinating  Musculoskeletal: Positive for arthralgias and back pain  Negative for myalgias  Skin: Negative for rash  Allergic/Immunologic: Negative for immunocompromised state  Neurological: Negative for dizziness, syncope, weakness, light-headedness and headaches  Hematological: Negative for adenopathy  Does not bruise/bleed easily  Psychiatric/Behavioral: Negative for dysphoric mood, sleep disturbance and suicidal ideas  The patient is not nervous/anxious           Problem List:     Patient Active Problem List   Diagnosis    Chronic systolic heart failure (HCC)    Essential hypertension    H/O aortic valve replacement with tissue graft    Mixed hyperlipidemia    Impaired fasting glucose    Iron deficiency anemia    Coronary artery disease involving native coronary artery of native heart without angina pectoris    PAF (paroxysmal atrial fibrillation) (HCC)    Transient amnesia    Thrombocytopenia (HCC)    Osteoporosis      Past Medical and Surgical History:     Past Medical History:   Diagnosis Date    Arthritis     HIP    [facility] [address] [facility]     Cancer of prostate ([facility] [address] ) 1998    removed in [place]    Heart attack ([facility] [address] )     High blood pressure     High cholesterol     Irregular heart beat     has ICD Pacemaker and Medication     Past Surgical History:   Procedure Laterality Date    AORTIC VALVE REPLACEMENT      CARDIAC DEFIBRILLATOR PLACEMENT      pulse generator    CATARACT EXTRACTION, BILATERAL Bilateral     HIP SURGERY Bilateral     PROSTATE CANCER GENE 3(PCA3) (HISTORICAL)      removed 1998 Urology Dr Nakita Joyner,     PROSTATECTOMY        Family History:     Family History   Problem Relation Age of Onset    Emphysema Mother     Substance Abuse Other     Cerebral aneurysm Brother     Dementia Neg Hx       Social History:     Social History     Socioeconomic History    Marital status: /Civil Union     Spouse name: None    Number of children: None    Years of education: None    Highest education level: None   Occupational History    Occupation: retired    Tobacco Use    Smoking status: Never Smoker    Smokeless tobacco: Never Used   Vaping Use    Vaping Use: Never used   Substance and Sexual Activity    Alcohol use: Yes     Comment: social    Drug use: No    Sexual activity: Not Currently     Comment: denied: history of high risk sexual behavior   Other Topics Concern    None   Social History Narrative        2 children 3 step children    Retired-/Material Handling    No teeth    Hobbies-golfing     Social Determinants of Health     Financial Resource Strain: Low Risk     Difficulty of Paying Living Expenses: Not hard at all   Food Insecurity: Not on file   Transportation Needs: No Transportation Needs    Lack of Transportation (Medical): No    Lack of Transportation (Non-Medical): No   Physical Activity: Not on file   Stress: Not on file   Social Connections: Not on file   Intimate Partner Violence: Not on file   Housing Stability: Not on file      Medications and Allergies:     Current Outpatient Medications   Medication Sig Dispense Refill    carvedilol (COREG) 12 5 mg tablet Take 18 75 mg by mouth      cyanocobalamin 100 MCG tablet Take 2,000 mcg by mouth      ELIQUIS 5 MG Take 5 mg by mouth 2 (two) times a day  1    ferrous sulfate 325 (65 Fe) mg tablet Take 325 mg by mouth daily with breakfast      furosemide (LASIX) 20 mg tablet Take 40 mg by mouth daily       Iron-Vitamin C (IRON 100/C PO) Take by mouth      leuprolide (ELIGARD) 45 MG injection Inject 45 mg under the skin      lifitegrast (XIIDRA) 5 % op solution Apply 1 drop to eye      lisinopril (ZESTRIL) 5 mg tablet Take 5 mg by mouth      Multiple Vitamin (MULTIVITAMIN PO) Take by mouth      Omega-3 Fatty Acids (FISH OIL PO) Take 2,400 mg by mouth      potassium chloride (KLOR-CON) 20 mEq packet Take 20 mEq by mouth daily      rosuvastatin (CRESTOR) 20 MG tablet TAKE 1 TABLET DAILY 90 tablet 3     No current facility-administered medications for this visit       Allergies   Allergen Reactions    Simvastatin Allergic Rhinitis      Immunizations:     Immunization History   Administered Date(s) Administered    COVID-19 PFIZER VACCINE 0 3 ML IM 03/20/2021, 04/10/2021, 11/19/2021    Influenza Split 01/14/2013, 09/23/2013    Influenza Split High Dose Preservative Free IM 11/25/2015, 09/06/2016, 09/11/2017    Influenza, high dose seasonal 0 7 mL 10/29/2018, 10/01/2019, 10/20/2020, 11/05/2021, 10/04/2022    Influenza, seasonal, injectable 11/08/2010, 12/10/2014    Influenza, seasonal, injectable, preservative free 10/19/2009, 11/21/2011    Pneumococcal Conjugate 13-Valent 09/06/2016    Pneumococcal Polysaccharide PPV23 05/10/2004, 05/29/2019    Tdap 05/04/2011, 01/01/2016      Health Maintenance: There are no preventive care reminders to display for this patient  Topic Date Due    COVID-19 Vaccine (4 - Booster for Pfizer series) 03/19/2022      Medicare Screening Tests and Risk Assessments:     Iveth Ferrer is here for his Subsequent Wellness visit  Last Medicare Wellness visit information reviewed, patient interviewed and updates made to the record today  Health Risk Assessment:   Patient rates overall health as good  Patient feels that their physical health rating is same  Patient is satisfied with their life  Eyesight was rated as slightly worse  Hearing was rated as same  Patient feels that their emotional and mental health rating is same  Patients states they are never, rarely angry  Patient states they are sometimes unusually tired/fatigued  Pain experienced in the last 7 days has been none  Patient states that he has experienced no weight loss or gain in last 6 months  Wears hearing aids    Depression Screening:   PHQ-2 Score: 0      Fall Risk Screening: In the past year, patient has experienced: no history of falling in past year      Home Safety:  Patient does not have trouble with stairs inside or outside of their home  Patient has working smoke alarms and has working carbon monoxide detector  Home safety hazards include: none  Nutrition:   Current diet is Regular  Medications:   Patient is currently taking over-the-counter supplements  OTC medications include: see medication list  Patient is able to manage medications  Activities of Daily Living (ADLs)/Instrumental Activities of Daily Living (IADLs):   Walk and transfer into and out of bed and chair?: Yes  Dress and groom yourself?: Yes    Bathe or shower yourself?: Yes    Feed yourself?  Yes  Do your laundry/housekeeping?: Yes  Manage your money, pay your bills and track your expenses?: Yes  Make your own meals?: Yes    Do your own shopping?: Yes    Previous Hospitalizations:   Any hospitalizations or ED visits within the last 12 months?: No      Advance Care Planning:   Living will: Yes    Durable POA for healthcare: Yes    Advanced directive: Yes      Comments: Will bring copy to office    Cognitive Screening:   Provider or family/friend/caregiver concerned regarding cognition?: No    PREVENTIVE SCREENINGS      Cardiovascular Screening:    General: Screening Not Indicated, History Lipid Disorder and Screening Current    Due for: Lipid Panel      Diabetes Screening:     General: Screening Current    Due for: Blood Glucose      Colorectal Cancer Screening:     General: Screening Not Indicated      Prostate Cancer Screening:    General: History Prostate Cancer and Screening Not Indicated      Osteoporosis Screening:    General: Screening Not Indicated, History Osteoporosis and Screening Current      Abdominal Aortic Aneurysm (AAA) Screening:        General: Screening Not Indicated      Lung Cancer Screening:     General: Screening Not Indicated      Hepatitis C Screening:    General: Screening Not Indicated    Screening, Brief Intervention, and Referral to Treatment (SBIRT)    Screening  Typical number of drinks in a day: 0  Typical number of drinks in a week: 1  Interpretation: Low risk drinking behavior  Single Item Drug Screening:  How often have you used an illegal drug (including marijuana) or a prescription medication for non-medical reasons in the past year? never    Single Item Drug Screen Score: 0  Interpretation: Negative screen for possible drug use disorder    Brief Intervention  Alcohol & drug use screenings were reviewed  No concerns regarding substance use disorder identified       No exam data present     Physical Exam:     BP 98/58 (BP Location: Right arm, Patient Position: Sitting, Cuff Size: Standard)   Pulse 77   Temp 98 °F (36 7 °C) (Tympanic)   Resp 16   Ht 5' 7" (1 702 m)   Wt 86 2 kg (190 lb)   SpO2 97%   BMI 29 76 kg/m²     Physical Exam  Vitals and nursing note reviewed  Constitutional:       General: He is not in acute distress  Appearance: Normal appearance  He is well-developed  He is not ill-appearing  HENT:      Head: Normocephalic and atraumatic  Right Ear: Ear canal and external ear normal       Left Ear: Ear canal and external ear normal       Nose: Nose normal       Comments:   Deviated nasal septum to the left foot blockage to inhalation left naris  Mucosa pale with increased clear mucoid drainage     Mouth/Throat:      Mouth: Mucous membranes are moist       Pharynx: Oropharynx is clear  No oropharyngeal exudate or posterior oropharyngeal erythema  Eyes:      Extraocular Movements: Extraocular movements intact  Conjunctiva/sclera: Conjunctivae normal       Pupils: Pupils are equal, round, and reactive to light  Comments:  Injected conjunctiva   Neck:      Thyroid: No thyromegaly  Vascular: No carotid bruit or JVD  Cardiovascular:      Rate and Rhythm: Normal rate and regular rhythm  Heart sounds: Murmur ( small systolic murmur in the aortic area) heard  Pulmonary:      Effort: Pulmonary effort is normal  No respiratory distress  Breath sounds: Normal breath sounds  Chest:      Chest wall: No tenderness  Musculoskeletal:         General: No swelling  Normal range of motion  Cervical back: Normal range of motion and neck supple  Right lower leg: Edema ( trace) present  Left lower leg: Edema ( 2+ to distal ankle) present  Lymphadenopathy:      Cervical: No cervical adenopathy  Skin:     General: Skin is warm and dry  Findings: No rash  Neurological:      General: No focal deficit present  Mental Status: He is alert and oriented to person, place, and time  Mental status is at baseline     Psychiatric:         Mood and Affect: Mood normal  Behavior: Behavior normal          Thought Content:  Thought content normal           Meet Chavarria PA-C

## 2022-10-04 NOTE — PATIENT INSTRUCTIONS
No change in current medications  Follow up with specialists as scheduled  Can give trial of Claritin ( loratadine) 10 mg 1 daily for allergy type symptoms  Schedule follow-up in 4 months, sooner as needed  Medicare Preventive Visit Patient Instructions  Thank you for completing your Welcome to Medicare Visit or Medicare Annual Wellness Visit today  Your next wellness visit will be due in one year (10/5/2023)  The screening/preventive services that you may require over the next 5-10 years are detailed below  Some tests may not apply to you based off risk factors and/or age  Screening tests ordered at today's visit but not completed yet may show as past due  Also, please note that scanned in results may not display below  Preventive Screenings:  Service Recommendations Previous Testing/Comments   Colorectal Cancer Screening  Colonoscopy    Fecal Occult Blood Test (FOBT)/Fecal Immunochemical Test (FIT)  Fecal DNA/Cologuard Test  Flexible Sigmoidoscopy Age: 39-70 years old   Colonoscopy: every 10 years (May be performed more frequently if at higher risk)  OR  FOBT/FIT: every 1 year  OR  Cologuard: every 3 years  OR  Sigmoidoscopy: every 5 years  Screening may be recommended earlier than age 39 if at higher risk for colorectal cancer  Also, an individualized decision between you and your healthcare provider will decide whether screening between the ages of 74-80 would be appropriate   Colonoscopy: 04/25/2008  FOBT/FIT: Not on file  Cologuard: Not on file  Sigmoidoscopy: Not on file    Screening Not Indicated     Prostate Cancer Screening Individualized decision between patient and health care provider in men between ages of 53-78   Medicare will cover every 12 months beginning on the day after your 50th birthday PSA: <0 1 ng/mL     History Prostate Cancer  Screening Not Indicated     Hepatitis C Screening Once for adults born between [facility]  More frequently in patients at high risk for Hepatitis C Hep C Antibody: Not on file        Diabetes Screening 1-2 times per year if you're at risk for diabetes or have pre-diabetes Fasting glucose: 100 mg/dL (9/23/2022)  A1C: 6 0 % (9/23/2022)  Screening Current   Cholesterol Screening Once every 5 years if you don't have a lipid disorder  May order more often based on risk factors  Lipid panel: 09/23/2022  Screening Not Indicated  History Lipid Disorder      Other Preventive Screenings Covered by Medicare:  Abdominal Aortic Aneurysm (AAA) Screening: covered once if your at risk  You're considered to be at risk if you have a family history of AAA or a male between the age of 73-68 who smoking at least 100 cigarettes in your lifetime  Lung Cancer Screening: covers low dose CT scan once per year if you meet all of the following conditions: (1) Age 50-69; (2) No signs or symptoms of lung cancer; (3) Current smoker or have quit smoking within the last 15 years; (4) You have a tobacco smoking history of at least 20 pack years (packs per day x number of years you smoked); (5) You get a written order from a healthcare provider  Glaucoma Screening: covered annually if you're considered high risk: (1) You have diabetes OR (2) Family history of glaucoma OR (3)  aged 48 and older OR (3)  American aged 72 and older  Osteoporosis Screening: covered every 2 years if you meet one of the following conditions: (1) Have a vertebral abnormality; (2) On glucocorticoid therapy for more than 3 months; (3) Have primary hyperparathyroidism; (4) On osteoporosis medications and need to assess response to drug therapy  HIV Screening: covered annually if you're between the age of 12-76  Also covered annually if you are younger than 13 and older than 72 with risk factors for HIV infection  For pregnant patients, it is covered up to 3 times per pregnancy      Immunizations:  Immunization Recommendations   Influenza Vaccine Annual influenza vaccination during flu season is recommended for all persons aged >= 6 months who do not have contraindications   Pneumococcal Vaccine   * Pneumococcal conjugate vaccine = PCV13 (Prevnar 13), PCV15 (Vaxneuvance), PCV20 (Prevnar 20)  * Pneumococcal polysaccharide vaccine = PPSV23 (Pneumovax) Adults 2364 years old: 1-3 doses may be recommended based on certain risk factors  Adults 72 years old: 1-2 doses may be recommended based off what pneumonia vaccine you previously received   Hepatitis B Vaccine 3 dose series if at intermediate or high risk (ex: diabetes, end stage renal disease, liver disease)   Tetanus (Td) Vaccine - COST NOT COVERED BY MEDICARE PART B Following completion of primary series, a booster dose should be given every 10 years to maintain immunity against tetanus  Td may also be given as tetanus wound prophylaxis  Tdap Vaccine - COST NOT COVERED BY MEDICARE PART B Recommended at least once for all adults  For pregnant patients, recommended with each pregnancy  Shingles Vaccine (Shingrix) - COST NOT COVERED BY MEDICARE PART B  2 shot series recommended in those aged 48 and above     Health Maintenance Due:  There are no preventive care reminders to display for this patient  Immunizations Due:      Topic Date Due    COVID-19 Vaccine (4 - Booster for Pfizer series) 03/19/2022    Influenza Vaccine (1) 09/01/2022     Advance Directives   What are advance directives? Advance directives are legal documents that state your wishes and plans for medical care  These plans are made ahead of time in case you lose your ability to make decisions for yourself  Advance directives can apply to any medical decision, such as the treatments you want, and if you want to donate organs  What are the types of advance directives? There are many types of advance directives, and each state has rules about how to use them  You may choose a combination of any of the following:  Living will: This is a written record of the treatment you want   You can also choose which treatments you do not want, which to limit, and which to stop at a certain time  This includes surgery, medicine, IV fluid, and tube feedings  [facility] power of  for healthcare [facility]): This is a written record that states who you want to make healthcare choices for you when you are unable to make them for yourself  This person, called a proxy, is usually a family member or a friend  You may choose more than 1 proxy  Do not resuscitate (DNR) order:  A DNR order is used in case your heart stops beating or you stop breathing  It is a request not to have certain forms of treatment, such as CPR  A DNR order may be included in other types of advance directives  Medical directive: This covers the care that you want if you are in a coma, near death, or unable to make decisions for yourself  You can list the treatments you want for each condition  Treatment may include pain medicine, surgery, blood transfusions, dialysis, IV or tube feedings, and a ventilator (breathing machine)  Values history: This document has questions about your views, beliefs, and how you feel and think about life  This information can help others choose the care that you would choose  Why are advance directives important? An advance directive helps you control your care  Although spoken wishes may be used, it is better to have your wishes written down  Spoken wishes can be misunderstood, or not followed  Treatments may be given even if you do not want them  An advance directive may make it easier for your family to make difficult choices about your care  Weight Management   Why it is important to manage your weight:  Being overweight increases your risk of health conditions such as heart disease, high blood pressure, type 2 diabetes, and certain types of cancer  It can also increase your risk for osteoarthritis, sleep apnea, and other respiratory problems  Aim for a slow, steady weight loss   Even a small amount of weight loss can lower your risk of health problems  How to lose weight safely:  A safe and healthy way to lose weight is to eat fewer calories and get regular exercise  You can lose up about 1 pound a week by decreasing the number of calories you eat by 500 calories each day  Healthy meal plan for weight management:  A healthy meal plan includes a variety of foods, contains fewer calories, and helps you stay healthy  A healthy meal plan includes the following:  Eat whole-grain foods more often  A healthy meal plan should contain fiber  Fiber is the part of grains, fruits, and vegetables that is not broken down by your body  Whole-grain foods are healthy and provide extra fiber in your diet  Some examples of whole-grain foods are whole-wheat breads and pastas, oatmeal, brown rice, and bulgur  Eat a variety of vegetables every day  Include dark, leafy greens such as spinach, kale, moncho greens, and mustard greens  Eat yellow and orange vegetables such as carrots, sweet potatoes, and winter squash  Eat a variety of fruits every day  Choose fresh or canned fruit (canned in its own juice or light syrup) instead of juice  Fruit juice has very little or no fiber  Eat low-fat dairy foods  Drink fat-free (skim) milk or 1% milk  Eat fat-free yogurt and low-fat cottage cheese  Try low-fat cheeses such as mozzarella and other reduced-fat cheeses  Choose meat and other protein foods that are low in fat  Choose beans or other legumes such as split peas or lentils  Choose fish, skinless poultry (chicken or turkey), or lean cuts of red meat (beef or pork)  Before you cook meat or poultry, cut off any visible fat  Use less fat and oil  Try baking foods instead of frying them  Add less fat, such as margarine, sour cream, regular salad dressing and mayonnaise to foods  Eat fewer high-fat foods  Some examples of high-fat foods include french fries, doughnuts, ice cream, and cakes  Eat fewer sweets    Limit foods and drinks that are high in sugar  This includes candy, cookies, regular soda, and sweetened drinks  Exercise:  Exercise at least 30 minutes per day on most days of the week  Some examples of exercise include walking, biking, dancing, and swimming  You can also fit in more physical activity by taking the stairs instead of the elevator or parking farther away from stores  Ask your healthcare provider about the best exercise plan for you  © Copyright [employer] 2018 Information is for End User's use only and may not be sold, redistributed or otherwise used for commercial purposes   All illustrations and images included in CareNotes® are the copyrighted property of A D A M , Inc  or [address] [place] [address]

## 2022-12-28 ENCOUNTER — APPOINTMENT (OUTPATIENT)
Dept: LAB | Facility: CLINIC | Age: 87
End: 2022-12-28

## 2022-12-28 DIAGNOSIS — H34.211 HOLLENHORST PLAQUE, RIGHT EYE: ICD-10-CM

## 2022-12-28 DIAGNOSIS — E78.00 PURE HYPERCHOLESTEROLEMIA: ICD-10-CM

## 2022-12-28 DIAGNOSIS — I42.8 NONISCHEMIC CARDIOMYOPATHY (HCC): ICD-10-CM

## 2022-12-28 DIAGNOSIS — I48.0 PAF (PAROXYSMAL ATRIAL FIBRILLATION) (HCC): ICD-10-CM

## 2022-12-28 DIAGNOSIS — I50.22 CHRONIC SYSTOLIC HEART FAILURE (HCC): ICD-10-CM

## 2022-12-28 DIAGNOSIS — D69.6 THROMBOCYTOPENIA (HCC): ICD-10-CM

## 2022-12-28 DIAGNOSIS — I25.10 CORONARY ARTERY DISEASE INVOLVING NATIVE CORONARY ARTERY OF NATIVE HEART WITHOUT ANGINA PECTORIS: ICD-10-CM

## 2022-12-28 DIAGNOSIS — Z95.810 PRESENCE OF BIVENTRICULAR AUTOMATIC CARDIOVERTER/DEFIBRILLATOR (AICD): ICD-10-CM

## 2022-12-28 DIAGNOSIS — I10 ESSENTIAL HYPERTENSION: ICD-10-CM

## 2022-12-28 DIAGNOSIS — Z79.01 LONG TERM CURRENT USE OF ANTICOAGULANT THERAPY: ICD-10-CM

## 2022-12-28 DIAGNOSIS — Z95.4 H/O AORTIC VALVE REPLACEMENT WITH TISSUE GRAFT: ICD-10-CM

## 2022-12-28 DIAGNOSIS — R73.01 IMPAIRED FASTING GLUCOSE: ICD-10-CM

## 2022-12-28 LAB
ALBUMIN SERPL BCP-MCNC: 3.6 G/DL (ref 3.5–5)
ALP SERPL-CCNC: 47 U/L (ref 46–116)
ALT SERPL W P-5'-P-CCNC: 29 U/L (ref 12–78)
ANION GAP SERPL CALCULATED.3IONS-SCNC: 4 MMOL/L (ref 4–13)
AST SERPL W P-5'-P-CCNC: 25 U/L (ref 5–45)
BASOPHILS # BLD AUTO: 0.06 THOUSANDS/ÂΜL (ref 0–0.1)
BASOPHILS NFR BLD AUTO: 1 % (ref 0–1)
BILIRUB SERPL-MCNC: 0.58 MG/DL (ref 0.2–1)
BUN SERPL-MCNC: 18 MG/DL (ref 5–25)
CALCIUM SERPL-MCNC: 9.3 MG/DL (ref 8.3–10.1)
CHLORIDE SERPL-SCNC: 110 MMOL/L (ref 96–108)
CHOLEST SERPL-MCNC: 138 MG/DL
CO2 SERPL-SCNC: 25 MMOL/L (ref 21–32)
CREAT SERPL-MCNC: 0.95 MG/DL (ref 0.6–1.3)
EOSINOPHIL # BLD AUTO: 0.14 THOUSAND/ÂΜL (ref 0–0.61)
EOSINOPHIL NFR BLD AUTO: 2 % (ref 0–6)
ERYTHROCYTE [DISTWIDTH] IN BLOOD BY AUTOMATED COUNT: 13.3 % (ref 11.6–15.1)
EST. AVERAGE GLUCOSE BLD GHB EST-MCNC: 120 MG/DL
GFR SERPL CREATININE-BSD FRML MDRD: 71 ML/MIN/1.73SQ M
GLUCOSE P FAST SERPL-MCNC: 110 MG/DL (ref 65–99)
HBA1C MFR BLD: 5.8 %
HCT VFR BLD AUTO: 35.4 % (ref 36.5–49.3)
HDLC SERPL-MCNC: 65 MG/DL
HGB BLD-MCNC: 11.3 G/DL (ref 12–17)
IMM GRANULOCYTES # BLD AUTO: 0.01 THOUSAND/UL (ref 0–0.2)
IMM GRANULOCYTES NFR BLD AUTO: 0 % (ref 0–2)
LDLC SERPL CALC-MCNC: 62 MG/DL (ref 0–100)
LYMPHOCYTES # BLD AUTO: 1.39 THOUSANDS/ÂΜL (ref 0.6–4.47)
LYMPHOCYTES NFR BLD AUTO: 21 % (ref 14–44)
MCH RBC QN AUTO: 30.5 PG (ref 26.8–34.3)
MCHC RBC AUTO-ENTMCNC: 31.9 G/DL (ref 31.4–37.4)
MCV RBC AUTO: 95 FL (ref 82–98)
MONOCYTES # BLD AUTO: 0.72 THOUSAND/ÂΜL (ref 0.17–1.22)
MONOCYTES NFR BLD AUTO: 11 % (ref 4–12)
NEUTROPHILS # BLD AUTO: 4.33 THOUSANDS/ÂΜL (ref 1.85–7.62)
NEUTS SEG NFR BLD AUTO: 65 % (ref 43–75)
NONHDLC SERPL-MCNC: 73 MG/DL
NRBC BLD AUTO-RTO: 0 /100 WBCS
PLATELET # BLD AUTO: 127 THOUSANDS/UL (ref 149–390)
PMV BLD AUTO: 10.9 FL (ref 8.9–12.7)
POTASSIUM SERPL-SCNC: 4.7 MMOL/L (ref 3.5–5.3)
PROT SERPL-MCNC: 6.7 G/DL (ref 6.4–8.4)
RBC # BLD AUTO: 3.71 MILLION/UL (ref 3.88–5.62)
SODIUM SERPL-SCNC: 139 MMOL/L (ref 135–147)
TRIGL SERPL-MCNC: 54 MG/DL
WBC # BLD AUTO: 6.65 THOUSAND/UL (ref 4.31–10.16)

## 2023-01-05 ENCOUNTER — OFFICE VISIT (OUTPATIENT)
Dept: INTERNAL MEDICINE CLINIC | Facility: CLINIC | Age: 88
End: 2023-01-05

## 2023-01-05 VITALS
BODY MASS INDEX: 30.13 KG/M2 | SYSTOLIC BLOOD PRESSURE: 138 MMHG | HEART RATE: 68 BPM | HEIGHT: 67 IN | TEMPERATURE: 97.9 F | WEIGHT: 192 LBS | DIASTOLIC BLOOD PRESSURE: 60 MMHG | OXYGEN SATURATION: 97 % | RESPIRATION RATE: 14 BRPM

## 2023-01-05 DIAGNOSIS — D69.6 THROMBOCYTOPENIA (HCC): ICD-10-CM

## 2023-01-05 DIAGNOSIS — D50.9 IRON DEFICIENCY ANEMIA, UNSPECIFIED IRON DEFICIENCY ANEMIA TYPE: ICD-10-CM

## 2023-01-05 DIAGNOSIS — I48.0 PAF (PAROXYSMAL ATRIAL FIBRILLATION) (HCC): ICD-10-CM

## 2023-01-05 DIAGNOSIS — E78.2 MIXED HYPERLIPIDEMIA: ICD-10-CM

## 2023-01-05 DIAGNOSIS — R73.01 IMPAIRED FASTING GLUCOSE: ICD-10-CM

## 2023-01-05 DIAGNOSIS — I10 ESSENTIAL HYPERTENSION: Primary | ICD-10-CM

## 2023-01-05 NOTE — PATIENT INSTRUCTIONS
No change in current medical management  Speak to your pharmacist about receiving the shingles vaccine  Casual follow-up in 4 months with repeat blood work for that visit, follow-up sooner as needed

## 2023-01-05 NOTE — PROGRESS NOTES
Assessment/Plan:   Patient Instructions   No change in current medical management  Speak to your pharmacist about receiving the shingles vaccine  Casual follow-up in 4 months with repeat blood work for that visit, follow-up sooner as needed  Quality Measures: BMI Counseling: Body mass index is 30 07 kg/m²  The BMI is above normal  Nutrition recommendations include decreasing portion sizes, encouraging healthy choices of fruits and vegetables, consuming healthier snacks, moderation in carbohydrate intake and reducing intake of cholesterol  Exercise recommendations include exercising 3-5 times per week  Rationale for BMI follow-up plan is due to patient being overweight or obese  Depression Screening and Follow-up Plan: Patient was screened for depression during today's encounter  They screened negative with a PHQ-2 score of 0  Return in about 4 months (around 5/5/2023) for Next scheduled follow up  Diagnoses and all orders for this visit:    Essential hypertension  -     CBC and differential; Future  -     Comprehensive metabolic panel; Future    Impaired fasting glucose  -     Hemoglobin A1C; Future    PAF (paroxysmal atrial fibrillation) (HCC)    Thrombocytopenia (HCC)  -     CBC and differential; Future    Mixed hyperlipidemia  -     Lipid panel; Future    Iron deficiency anemia, unspecified iron deficiency anemia type        Subjective:      Patient ID: Miguel Llanos is a 80 y o  male  Follow-up, labs reviewed with patient    Hypertension: Good control on current medication  Estefani cp, palp, sob, edema, HA, dizziness, diaphoresis, syncope, visual disturbance  Patient informs me he is not taking his furosemide  He will discuss with his cardiologist at his next visit  He denies any leg swelling, weight gain, shortness of breath  No PND or orthopnea symptoms  Impaired fasting glucose: , A1c 5 8  Despite this being after the holidays he has improved    Denies polyuria, polyphagia, polydipsia  Hyperlipidemia: Good control on current medication  On high-dose rosuvastatin  Denies side effects  Paroxysmal atrial fibrillation: Follows with cardiology  On Eliquis  Thrombocytopenia: Stable  Continue to monitor  Iron deficiency anemia: H&H stable  Taking iron  Overall states he is feeling well  He states approximately 6 months ago his granddaughter and great granddaughter moved in with he and his wife as they were having trouble when the place they were renting was sold from under them  This has brought yenifer to this patient, he is not having any difficulty with them living with them  We did discuss shingles immunization        ALLERGIES:  Allergies   Allergen Reactions   • Simvastatin Allergic Rhinitis       CURRENT MEDICATIONS:    Current Outpatient Medications:   •  carvedilol (COREG) 12 5 mg tablet, Take 18 75 mg by mouth, Disp: , Rfl:   •  cyanocobalamin 100 MCG tablet, Take 2,000 mcg by mouth, Disp: , Rfl:   •  ELIQUIS 5 MG, Take 5 mg by mouth 2 (two) times a day, Disp: , Rfl: 1  •  ferrous sulfate 325 (65 Fe) mg tablet, Take 325 mg by mouth daily with breakfast, Disp: , Rfl:   •  Iron-Vitamin C (IRON 100/C PO), Take by mouth, Disp: , Rfl:   •  leuprolide (ELIGARD) 45 MG injection, Inject 45 mg under the skin, Disp: , Rfl:   •  lifitegrast (XIIDRA) 5 % op solution, Apply 1 drop to eye, Disp: , Rfl:   •  lisinopril (ZESTRIL) 5 mg tablet, Take 5 mg by mouth, Disp: , Rfl:   •  Multiple Vitamin (MULTIVITAMIN PO), Take by mouth, Disp: , Rfl:   •  Omega-3 Fatty Acids (FISH OIL PO), Take 2,400 mg by mouth, Disp: , Rfl:   •  potassium chloride (KLOR-CON) 20 mEq packet, Take 20 mEq by mouth daily, Disp: , Rfl:   •  rosuvastatin (CRESTOR) 20 MG tablet, TAKE 1 TABLET DAILY, Disp: 90 tablet, Rfl: 3  •  furosemide (LASIX) 20 mg tablet, Take 40 mg by mouth daily  (Patient not taking: Reported on 1/5/2023), Disp: , Rfl:     ACTIVE PROBLEM LIST:  Patient Active Problem List Diagnosis   • Chronic systolic heart failure (HCC)   • Essential hypertension   • H/O aortic valve replacement with tissue graft   • Mixed hyperlipidemia   • Impaired fasting glucose   • Iron deficiency anemia   • Coronary artery disease involving native coronary artery of native heart without angina pectoris   • PAF (paroxysmal atrial fibrillation) (HCC)   • Transient amnesia   • Thrombocytopenia (HCC)   • Osteoporosis       PAST MEDICAL HISTORY:  Past Medical History:   Diagnosis Date   • Arthritis     HIP   • Cancer [facility])    • Cancer of prostate ([facility] [address] ) 1998    removed in [address] [place]   • Heart attack ([facility] [address] )    • High blood pressure    • High cholesterol    • Irregular heart beat     has ICD Pacemaker and Medication       PAST SURGICAL HISTORY:  Past Surgical History:   Procedure Laterality Date   • AORTIC VALVE REPLACEMENT     • CARDIAC DEFIBRILLATOR PLACEMENT      pulse generator   • CATARACT EXTRACTION, BILATERAL Bilateral    • HIP SURGERY Bilateral    • PROSTATE CANCER GENE 3(PCA3) (HISTORICAL)      removed 1998 Urology Dr Ewa Ruiz,    • PROSTATECTOMY         FAMILY HISTORY:  Family History   Problem Relation Age of Onset   • Emphysema Mother    • Substance Abuse Other    • Cerebral aneurysm Brother    • Dementia Neg Hx        SOCIAL HISTORY:  Social History     Socioeconomic History   • Marital status: /Civil Union     Spouse name: Not on file   • Number of children: Not on file   • Years of education: Not on file   • Highest education level: Not on file   Occupational History   • Occupation: retired    Tobacco Use   • Smoking status: Never   • Smokeless tobacco: Never   Vaping Use   • Vaping Use: Never used   Substance and Sexual Activity   • Alcohol use: Yes     Comment: social   • Drug use: No   • Sexual activity: Not Currently     Comment: denied: history of high risk sexual behavior   Other Topics Concern   • Not on file   Social History Narrative     2 children 3 step children    Retired-/Material Handling    No teeth    Hobbies-golfing     Social Determinants of Health     Financial Resource Strain: Low Risk    • Difficulty of Paying Living Expenses: Not hard at all   Food Insecurity: Not on file   Transportation Needs: No Transportation Needs   • Lack of Transportation (Medical): No   • Lack of Transportation (Non-Medical): No   Physical Activity: Not on file   Stress: Not on file   Social Connections: Not on file   Intimate Partner Violence: Not on file   Housing Stability: Not on file       Review of Systems   Constitutional: Negative for activity change, chills, fatigue and fever  HENT: Positive for rhinorrhea (Occurs during eating)  Negative for congestion  Eyes: Negative for discharge  Respiratory: Negative for cough, chest tightness and shortness of breath  Cardiovascular: Negative for chest pain, palpitations and leg swelling  Gastrointestinal: Negative for abdominal pain, blood in stool, constipation, diarrhea, nausea and vomiting  Endocrine: Negative for polydipsia, polyphagia and polyuria  Genitourinary: Negative for difficulty urinating  Musculoskeletal: Negative for arthralgias and myalgias  Skin: Negative for rash  Allergic/Immunologic: Negative for immunocompromised state  Neurological: Negative for dizziness, syncope, weakness, light-headedness and headaches  Hematological: Negative for adenopathy  Does not bruise/bleed easily  Psychiatric/Behavioral: Negative for dysphoric mood, sleep disturbance and suicidal ideas  The patient is not nervous/anxious  Objective:  Vitals:    01/05/23 0955   BP: 138/60   BP Location: Right arm   Patient Position: Sitting   Cuff Size: Standard   Pulse: 68   Resp: 14   Temp: 97 9 °F (36 6 °C)   TempSrc: Tympanic   SpO2: 97%   Weight: 87 1 kg (192 lb)   Height: 5' 7" (1 702 m)     Body mass index is 30 07 kg/m²  Physical Exam  Vitals and nursing note reviewed  Constitutional:       General: He is not in acute distress  Appearance: He is well-developed  He is not ill-appearing  HENT:      Head: Normocephalic and atraumatic  Eyes:      Extraocular Movements: Extraocular movements intact  Pupils: Pupils are equal, round, and reactive to light  Neck:      Thyroid: No thyromegaly  Vascular: No carotid bruit or JVD  Cardiovascular:      Rate and Rhythm: Normal rate and regular rhythm  Heart sounds: Normal heart sounds  Pulmonary:      Effort: Pulmonary effort is normal  No respiratory distress  Breath sounds: Normal breath sounds  Musculoskeletal:      Cervical back: Neck supple  Right lower leg: No edema  Left lower leg: No edema  Lymphadenopathy:      Cervical: No cervical adenopathy  Skin:     General: Skin is warm and dry  Findings: No rash  Neurological:      General: No focal deficit present  Mental Status: He is alert and oriented to person, place, and time  Mental status is at baseline     Psychiatric:         Mood and Affect: Mood normal          Behavior: Behavior normal            RESULTS:    Recent Results (from the past 1008 hour(s))   CBC and differential    Collection Time: 12/28/22  9:31 AM   Result Value Ref Range    WBC 6 65 4 31 - 10 16 Thousand/uL    RBC 3 71 (L) 3 88 - 5 62 Million/uL    Hemoglobin 11 3 (L) 12 0 - 17 0 g/dL    Hematocrit 35 4 (L) 36 5 - 49 3 %    MCV 95 82 - 98 fL    MCH 30 5 26 8 - 34 3 pg    MCHC 31 9 31 4 - 37 4 g/dL    RDW 13 3 11 6 - 15 1 %    MPV 10 9 8 9 - 12 7 fL    Platelets 235 (L) 989 - 390 Thousands/uL    nRBC 0 /100 WBCs    Neutrophils Relative 65 43 - 75 %    Immat GRANS % 0 0 - 2 %    Lymphocytes Relative 21 14 - 44 %    Monocytes Relative 11 4 - 12 %    Eosinophils Relative 2 0 - 6 %    Basophils Relative 1 0 - 1 %    Neutrophils Absolute 4 33 1 85 - 7 62 Thousands/µL    Immature Grans Absolute 0 01 0 00 - 0 20 Thousand/uL    Lymphocytes Absolute 1 39 0 60 - 4 47 Thousands/µL    Monocytes Absolute 0 72 0 17 - 1 22 Thousand/µL    Eosinophils Absolute 0 14 0 00 - 0 61 Thousand/µL    Basophils Absolute 0 06 0 00 - 0 10 Thousands/µL   Comprehensive metabolic panel    Collection Time: 12/28/22  9:31 AM   Result Value Ref Range    Sodium 139 135 - 147 mmol/L    Potassium 4 7 3 5 - 5 3 mmol/L    Chloride 110 (H) 96 - 108 mmol/L    CO2 25 21 - 32 mmol/L    ANION GAP 4 4 - 13 mmol/L    BUN 18 5 - 25 mg/dL    Creatinine 0 95 0 60 - 1 30 mg/dL    Glucose, Fasting 110 (H) 65 - 99 mg/dL    Calcium 9 3 8 3 - 10 1 mg/dL    AST 25 5 - 45 U/L    ALT 29 12 - 78 U/L    Alkaline Phosphatase 47 46 - 116 U/L    Total Protein 6 7 6 4 - 8 4 g/dL    Albumin 3 6 3 5 - 5 0 g/dL    Total Bilirubin 0 58 0 20 - 1 00 mg/dL    eGFR 71 ml/min/1 73sq m   Hemoglobin A1C    Collection Time: 12/28/22  9:31 AM   Result Value Ref Range    Hemoglobin A1C 5 8 (H) Normal 3 8-5 6%; PreDiabetic 5 7-6 4%; Diabetic >=6 5%; Glycemic control for adults with diabetes <7 0% %     mg/dl   Lipid panel    Collection Time: 12/28/22  9:31 AM   Result Value Ref Range    Cholesterol 138 See Comment mg/dL    Triglycerides 54 See Comment mg/dL    HDL, Direct 65 >=40 mg/dL    LDL Calculated 62 0 - 100 mg/dL    Non-HDL-Chol (CHOL-HDL) 73 mg/dl       This note was created with voice recognition software  Phonic, grammatical and spelling errors may be present within the note as a result

## 2023-02-23 ENCOUNTER — APPOINTMENT (OUTPATIENT)
Dept: LAB | Facility: CLINIC | Age: 88
End: 2023-02-23

## 2023-02-23 DIAGNOSIS — E78.2 MIXED HYPERLIPIDEMIA: ICD-10-CM

## 2023-02-23 DIAGNOSIS — I10 ESSENTIAL HYPERTENSION: ICD-10-CM

## 2023-02-23 DIAGNOSIS — R73.01 IMPAIRED FASTING GLUCOSE: ICD-10-CM

## 2023-02-23 DIAGNOSIS — D69.6 THROMBOCYTOPENIA (HCC): ICD-10-CM

## 2023-05-04 ENCOUNTER — APPOINTMENT (OUTPATIENT)
Dept: LAB | Facility: CLINIC | Age: 88
End: 2023-05-04

## 2023-05-04 LAB
ALBUMIN SERPL BCP-MCNC: 3.5 G/DL (ref 3.5–5)
ALP SERPL-CCNC: 52 U/L (ref 46–116)
ALT SERPL W P-5'-P-CCNC: 34 U/L (ref 12–78)
ANION GAP SERPL CALCULATED.3IONS-SCNC: 4 MMOL/L (ref 4–13)
AST SERPL W P-5'-P-CCNC: 24 U/L (ref 5–45)
BASOPHILS # BLD AUTO: 0.07 THOUSANDS/ΜL (ref 0–0.1)
BASOPHILS NFR BLD AUTO: 1 % (ref 0–1)
BILIRUB SERPL-MCNC: 0.58 MG/DL (ref 0.2–1)
BUN SERPL-MCNC: 18 MG/DL (ref 5–25)
CALCIUM SERPL-MCNC: 9.2 MG/DL (ref 8.3–10.1)
CHLORIDE SERPL-SCNC: 109 MMOL/L (ref 96–108)
CHOLEST SERPL-MCNC: 141 MG/DL
CO2 SERPL-SCNC: 26 MMOL/L (ref 21–32)
CREAT SERPL-MCNC: 0.87 MG/DL (ref 0.6–1.3)
EOSINOPHIL # BLD AUTO: 0.18 THOUSAND/ΜL (ref 0–0.61)
EOSINOPHIL NFR BLD AUTO: 3 % (ref 0–6)
ERYTHROCYTE [DISTWIDTH] IN BLOOD BY AUTOMATED COUNT: 13.5 % (ref 11.6–15.1)
GFR SERPL CREATININE-BSD FRML MDRD: 77 ML/MIN/1.73SQ M
GLUCOSE P FAST SERPL-MCNC: 112 MG/DL (ref 65–99)
HCT VFR BLD AUTO: 35.6 % (ref 36.5–49.3)
HDLC SERPL-MCNC: 67 MG/DL
HGB BLD-MCNC: 11.5 G/DL (ref 12–17)
IMM GRANULOCYTES # BLD AUTO: 0.01 THOUSAND/UL (ref 0–0.2)
IMM GRANULOCYTES NFR BLD AUTO: 0 % (ref 0–2)
LDLC SERPL CALC-MCNC: 63 MG/DL (ref 0–100)
LYMPHOCYTES # BLD AUTO: 1.52 THOUSANDS/ΜL (ref 0.6–4.47)
LYMPHOCYTES NFR BLD AUTO: 25 % (ref 14–44)
MCH RBC QN AUTO: 30.8 PG (ref 26.8–34.3)
MCHC RBC AUTO-ENTMCNC: 32.3 G/DL (ref 31.4–37.4)
MCV RBC AUTO: 95 FL (ref 82–98)
MONOCYTES # BLD AUTO: 0.62 THOUSAND/ΜL (ref 0.17–1.22)
MONOCYTES NFR BLD AUTO: 10 % (ref 4–12)
NEUTROPHILS # BLD AUTO: 3.66 THOUSANDS/ΜL (ref 1.85–7.62)
NEUTS SEG NFR BLD AUTO: 61 % (ref 43–75)
NONHDLC SERPL-MCNC: 74 MG/DL
NRBC BLD AUTO-RTO: 0 /100 WBCS
PLATELET # BLD AUTO: 141 THOUSANDS/UL (ref 149–390)
PMV BLD AUTO: 10.8 FL (ref 8.9–12.7)
POTASSIUM SERPL-SCNC: 4.5 MMOL/L (ref 3.5–5.3)
PROT SERPL-MCNC: 6.6 G/DL (ref 6.4–8.4)
RBC # BLD AUTO: 3.73 MILLION/UL (ref 3.88–5.62)
SODIUM SERPL-SCNC: 139 MMOL/L (ref 135–147)
TRIGL SERPL-MCNC: 57 MG/DL
WBC # BLD AUTO: 6.06 THOUSAND/UL (ref 4.31–10.16)

## 2023-05-06 LAB
EST. AVERAGE GLUCOSE BLD GHB EST-MCNC: 123 MG/DL
HBA1C MFR BLD: 5.9 %

## 2023-05-10 ENCOUNTER — OFFICE VISIT (OUTPATIENT)
Dept: INTERNAL MEDICINE CLINIC | Facility: CLINIC | Age: 88
End: 2023-05-10

## 2023-05-10 VITALS
WEIGHT: 195 LBS | HEIGHT: 67 IN | BODY MASS INDEX: 30.61 KG/M2 | HEART RATE: 89 BPM | OXYGEN SATURATION: 97 % | SYSTOLIC BLOOD PRESSURE: 128 MMHG | DIASTOLIC BLOOD PRESSURE: 78 MMHG | TEMPERATURE: 98 F | RESPIRATION RATE: 14 BRPM

## 2023-05-10 DIAGNOSIS — I25.10 CORONARY ARTERY DISEASE INVOLVING NATIVE CORONARY ARTERY OF NATIVE HEART WITHOUT ANGINA PECTORIS: ICD-10-CM

## 2023-05-10 DIAGNOSIS — R73.01 IMPAIRED FASTING GLUCOSE: ICD-10-CM

## 2023-05-10 DIAGNOSIS — I10 ESSENTIAL HYPERTENSION: Primary | ICD-10-CM

## 2023-05-10 DIAGNOSIS — E78.2 MIXED HYPERLIPIDEMIA: ICD-10-CM

## 2023-05-10 DIAGNOSIS — D50.9 IRON DEFICIENCY ANEMIA, UNSPECIFIED IRON DEFICIENCY ANEMIA TYPE: ICD-10-CM

## 2023-05-10 DIAGNOSIS — D69.6 THROMBOCYTOPENIA (HCC): ICD-10-CM

## 2023-05-10 DIAGNOSIS — I48.0 PAF (PAROXYSMAL ATRIAL FIBRILLATION) (HCC): ICD-10-CM

## 2023-05-10 NOTE — PATIENT INSTRUCTIONS
Continue current medications  Continue follow-up with specialists  Schedule follow-up in 4 months, sooner as needed

## 2023-05-10 NOTE — PROGRESS NOTES
Assessment/Plan:   Patient Instructions   Continue current medications  Continue follow-up with specialists  Schedule follow-up in 4 months, sooner as needed  Quality Measures:   Depression Screening and Follow-up Plan: Patient was screened for depression during today's encounter  They screened negative with a PHQ-2 score of 0  Return in about 5 months (around 10/10/2023) for Medicare Annual Wellness with Physical          Diagnoses and all orders for this visit:    Essential hypertension  -     CBC and differential; Future  -     Comprehensive metabolic panel; Future    Coronary artery disease involving native coronary artery of native heart without angina pectoris    PAF (paroxysmal atrial fibrillation) (HCC)    Thrombocytopenia (HCC)    Mixed hyperlipidemia  -     Lipid panel; Future    Iron deficiency anemia, unspecified iron deficiency anemia type    Impaired fasting glucose  -     Hemoglobin A1C; Future        Subjective:      Patient ID: Yasmine Mitchell is a 80 y o  male  Follow-up, labs reviewed with patient    Hypertension: Good control on current medication  Estefani cp, palp, sob, edema, HA, dizziness, diaphoresis, syncope, visual disturbance  Patient does state that if he walks up an incline he does get short of breath  I asked him to use this as a barometer  If the shortness of breath worsens he is to let us know  I also found out that he is only using his diuretic as needed  I reviewed with him signs and symptoms to look out for and initiate his diuretic if needed  Coronary artery disease/aortic valve replacement with bioprosthetic valve  As above  Follows with cardiology  History of paroxysmal atrial fibrillation  On Eliquis  Anemia/thrombocytopenia/iron deficiency: Stable  Impaired fasting glucose:  with A1c 5 9  Denies polyuria, polyphagia, polydipsia  Renal functions all normal with normal GFR    Encouraged to stay hydrated and avoid NSAIDs  Hyperlipidemia: Labs show excellent control  On rosuvastatin  Tolerating without difficulty  Continue the medication  No other focal concerns        ALLERGIES:  Allergies   Allergen Reactions   • Simvastatin Allergic Rhinitis       CURRENT MEDICATIONS:    Current Outpatient Medications:   •  carvedilol (COREG) 12 5 mg tablet, Take 18 75 mg by mouth, Disp: , Rfl:   •  cyanocobalamin 100 MCG tablet, Take 2,000 mcg by mouth, Disp: , Rfl:   •  ELIQUIS 5 MG, Take 5 mg by mouth 2 (two) times a day, Disp: , Rfl: 1  •  ferrous sulfate 325 (65 Fe) mg tablet, Take 325 mg by mouth daily with breakfast, Disp: , Rfl:   •  Iron-Vitamin C (IRON 100/C PO), Take by mouth, Disp: , Rfl:   •  leuprolide (ELIGARD) 45 MG injection, Inject 45 mg under the skin, Disp: , Rfl:   •  lifitegrast (XIIDRA) 5 % op solution, Apply 1 drop to eye, Disp: , Rfl:   •  lisinopril (ZESTRIL) 5 mg tablet, Take 5 mg by mouth, Disp: , Rfl:   •  Multiple Vitamin (MULTIVITAMIN PO), Take by mouth, Disp: , Rfl:   •  Omega-3 Fatty Acids (FISH OIL PO), Take 2,400 mg by mouth, Disp: , Rfl:   •  potassium chloride (KLOR-CON) 20 mEq packet, Take 20 mEq by mouth daily, Disp: , Rfl:   •  rosuvastatin (CRESTOR) 20 MG tablet, TAKE 1 TABLET DAILY, Disp: 90 tablet, Rfl: 3  •  furosemide (LASIX) 20 mg tablet, Take 40 mg by mouth daily  (Patient not taking: Reported on 1/5/2023), Disp: , Rfl:     ACTIVE PROBLEM LIST:  Patient Active Problem List   Diagnosis   • Chronic systolic heart failure (HCC)   • Essential hypertension   • H/O aortic valve replacement with tissue graft   • Mixed hyperlipidemia   • Impaired fasting glucose   • Iron deficiency anemia   • Coronary artery disease involving native coronary artery of native heart without angina pectoris   • PAF (paroxysmal atrial fibrillation) (HCC)   • Transient amnesia   • Thrombocytopenia (HCC)   • Osteoporosis       PAST MEDICAL HISTORY:  Past Medical History:   Diagnosis Date   • Arthritis     HIP   • Cancer [facility])    • Cancer of prostate ([facility] [address] ) 1998    removed in [place] [facility]   • Heart attack ([facility] [address] )    • High blood pressure    • High cholesterol    • Irregular heart beat     has ICD Pacemaker and Medication       PAST SURGICAL HISTORY:  Past Surgical History:   Procedure Laterality Date   • AORTIC VALVE REPLACEMENT     • CARDIAC DEFIBRILLATOR PLACEMENT      pulse generator   • CATARACT EXTRACTION, BILATERAL Bilateral    • HIP SURGERY Bilateral    • PROSTATE CANCER GENE 3(PCA3) (HISTORICAL)      removed 1998 Urology Dr Lynn Herrera,    • PROSTATECTOMY         FAMILY HISTORY:  Family History   Problem Relation Age of Onset   • Emphysema Mother    • Substance Abuse Other    • Cerebral aneurysm Brother    • Dementia Neg Hx        SOCIAL HISTORY:  Social History     Socioeconomic History   • Marital status: /Civil Union     Spouse name: Not on file   • Number of children: Not on file   • Years of education: Not on file   • Highest education level: Not on file   Occupational History   • Occupation: retired    Tobacco Use   • Smoking status: Never   • Smokeless tobacco: Never   Vaping Use   • Vaping Use: Never used   Substance and Sexual Activity   • Alcohol use: Yes     Comment: social   • Drug use: No   • Sexual activity: Not Currently     Comment: denied: history of high risk sexual behavior   Other Topics Concern   • Not on file   Social History Narrative        2 children 3 step children    Retired-/Material Handling    No teeth    Hobbies-golfing     Social Determinants of Health     Financial Resource Strain: Low Risk    • Difficulty of Paying Living Expenses: Not hard at all   Food Insecurity: Not on file   Transportation Needs: No Transportation Needs   • Lack of Transportation (Medical): No   • Lack of Transportation (Non-Medical):  No   Physical Activity: Not on file   Stress: Not on file   Social Connections: Not on file   Intimate Partner Violence: Not on "file   Housing Stability: Not on file       Review of Systems   Constitutional: Negative for activity change, chills, fatigue and fever  HENT: Negative for congestion  Eyes: Positive for visual disturbance (Wears glasses)  Negative for discharge  Respiratory: Negative for cough, chest tightness and shortness of breath  Cardiovascular: Negative for chest pain, palpitations and leg swelling  Gastrointestinal: Negative for abdominal pain, blood in stool, constipation, diarrhea, nausea and vomiting  Endocrine: Negative for polydipsia, polyphagia and polyuria  Genitourinary: Negative for difficulty urinating  Musculoskeletal: Negative for arthralgias and myalgias  Skin: Negative for rash  Allergic/Immunologic: Negative for immunocompromised state  Neurological: Negative for dizziness, syncope, weakness, light-headedness and headaches  Hematological: Negative for adenopathy  Does not bruise/bleed easily  Psychiatric/Behavioral: Negative for dysphoric mood, sleep disturbance and suicidal ideas  The patient is not nervous/anxious  Objective:  Vitals:    05/10/23 1008   BP: 128/78   BP Location: Right arm   Patient Position: Sitting   Cuff Size: Standard   Pulse: 89   Resp: 14   Temp: 98 °F (36 7 °C)   TempSrc: Tympanic   SpO2: 97%   Weight: 88 5 kg (195 lb)   Height: 5' 7\" (1 702 m)     Body mass index is 30 54 kg/m²  Physical Exam  Vitals and nursing note reviewed  Constitutional:       General: He is not in acute distress  Appearance: He is well-developed  HENT:      Head: Normocephalic and atraumatic  Eyes:      Extraocular Movements: Extraocular movements intact  Pupils: Pupils are equal, round, and reactive to light  Neck:      Thyroid: No thyromegaly  Vascular: No carotid bruit or JVD  Cardiovascular:      Rate and Rhythm: Normal rate and regular rhythm  Heart sounds: Normal heart sounds        Comments: Increased A2  Pulmonary:      Effort: " Pulmonary effort is normal  No respiratory distress  Breath sounds: Normal breath sounds  Musculoskeletal:      Cervical back: Neck supple  Right lower leg: No edema  Left lower leg: Edema (Trace edema) present  Lymphadenopathy:      Cervical: No cervical adenopathy  Skin:     General: Skin is warm and dry  Findings: No rash  Neurological:      General: No focal deficit present  Mental Status: He is alert and oriented to person, place, and time  Mental status is at baseline  Psychiatric:         Mood and Affect: Mood normal          Behavior: Behavior normal          Thought Content: Thought content normal            RESULTS:    In chart    This note was created with voice recognition software  Phonic, grammatical and spelling errors may be present within the note as a result

## 2023-10-05 ENCOUNTER — APPOINTMENT (OUTPATIENT)
Dept: LAB | Facility: CLINIC | Age: 88
End: 2023-10-05
Payer: MEDICARE

## 2023-10-05 DIAGNOSIS — E78.2 MIXED HYPERLIPIDEMIA: ICD-10-CM

## 2023-10-05 DIAGNOSIS — I10 ESSENTIAL HYPERTENSION: ICD-10-CM

## 2023-10-05 DIAGNOSIS — R73.01 IMPAIRED FASTING GLUCOSE: ICD-10-CM

## 2023-10-05 LAB
ALBUMIN SERPL BCP-MCNC: 3.9 G/DL (ref 3.5–5)
ALP SERPL-CCNC: 47 U/L (ref 34–104)
ALT SERPL W P-5'-P-CCNC: 23 U/L (ref 7–52)
ANION GAP SERPL CALCULATED.3IONS-SCNC: 11 MMOL/L
AST SERPL W P-5'-P-CCNC: 30 U/L (ref 13–39)
BASOPHILS # BLD AUTO: 0.06 THOUSANDS/ÂΜL (ref 0–0.1)
BASOPHILS NFR BLD AUTO: 1 % (ref 0–1)
BILIRUB SERPL-MCNC: 0.7 MG/DL (ref 0.2–1)
BUN SERPL-MCNC: 31 MG/DL (ref 5–25)
CALCIUM SERPL-MCNC: 8.8 MG/DL (ref 8.4–10.2)
CHLORIDE SERPL-SCNC: 107 MMOL/L (ref 96–108)
CHOLEST SERPL-MCNC: 130 MG/DL
CO2 SERPL-SCNC: 24 MMOL/L (ref 21–32)
CREAT SERPL-MCNC: 1 MG/DL (ref 0.6–1.3)
EOSINOPHIL # BLD AUTO: 0.34 THOUSAND/ÂΜL (ref 0–0.61)
EOSINOPHIL NFR BLD AUTO: 4 % (ref 0–6)
ERYTHROCYTE [DISTWIDTH] IN BLOOD BY AUTOMATED COUNT: 13.5 % (ref 11.6–15.1)
EST. AVERAGE GLUCOSE BLD GHB EST-MCNC: 134 MG/DL
GFR SERPL CREATININE-BSD FRML MDRD: 66 ML/MIN/1.73SQ M
GLUCOSE P FAST SERPL-MCNC: 138 MG/DL (ref 65–99)
HBA1C MFR BLD: 6.3 %
HCT VFR BLD AUTO: 32.8 % (ref 36.5–49.3)
HDLC SERPL-MCNC: 53 MG/DL
HGB BLD-MCNC: 11.1 G/DL (ref 12–17)
IMM GRANULOCYTES # BLD AUTO: 0.03 THOUSAND/UL (ref 0–0.2)
IMM GRANULOCYTES NFR BLD AUTO: 0 % (ref 0–2)
LDLC SERPL CALC-MCNC: 54 MG/DL (ref 0–100)
LYMPHOCYTES # BLD AUTO: 1.24 THOUSANDS/ÂΜL (ref 0.6–4.47)
LYMPHOCYTES NFR BLD AUTO: 16 % (ref 14–44)
MCH RBC QN AUTO: 31.9 PG (ref 26.8–34.3)
MCHC RBC AUTO-ENTMCNC: 33.8 G/DL (ref 31.4–37.4)
MCV RBC AUTO: 94 FL (ref 82–98)
MONOCYTES # BLD AUTO: 0.85 THOUSAND/ÂΜL (ref 0.17–1.22)
MONOCYTES NFR BLD AUTO: 11 % (ref 4–12)
NEUTROPHILS # BLD AUTO: 5.3 THOUSANDS/ÂΜL (ref 1.85–7.62)
NEUTS SEG NFR BLD AUTO: 68 % (ref 43–75)
NONHDLC SERPL-MCNC: 77 MG/DL
NRBC BLD AUTO-RTO: 0 /100 WBCS
PLATELET # BLD AUTO: 139 THOUSANDS/UL (ref 149–390)
PMV BLD AUTO: 10.6 FL (ref 8.9–12.7)
POTASSIUM SERPL-SCNC: 4.5 MMOL/L (ref 3.5–5.3)
PROT SERPL-MCNC: 6.5 G/DL (ref 6.4–8.4)
RBC # BLD AUTO: 3.48 MILLION/UL (ref 3.88–5.62)
SODIUM SERPL-SCNC: 142 MMOL/L (ref 135–147)
TRIGL SERPL-MCNC: 115 MG/DL
WBC # BLD AUTO: 7.82 THOUSAND/UL (ref 4.31–10.16)

## 2023-10-05 PROCEDURE — 80061 LIPID PANEL: CPT

## 2023-10-05 PROCEDURE — 85025 COMPLETE CBC W/AUTO DIFF WBC: CPT

## 2023-10-05 PROCEDURE — 80053 COMPREHEN METABOLIC PANEL: CPT

## 2023-10-05 PROCEDURE — 83036 HEMOGLOBIN GLYCOSYLATED A1C: CPT

## 2023-10-05 PROCEDURE — 36415 COLL VENOUS BLD VENIPUNCTURE: CPT

## 2023-10-11 ENCOUNTER — OFFICE VISIT (OUTPATIENT)
Dept: INTERNAL MEDICINE CLINIC | Facility: CLINIC | Age: 88
End: 2023-10-11
Payer: MEDICARE

## 2023-10-11 VITALS
OXYGEN SATURATION: 98 % | SYSTOLIC BLOOD PRESSURE: 110 MMHG | HEART RATE: 72 BPM | HEIGHT: 67 IN | RESPIRATION RATE: 16 BRPM | WEIGHT: 186.6 LBS | DIASTOLIC BLOOD PRESSURE: 60 MMHG | BODY MASS INDEX: 29.29 KG/M2

## 2023-10-11 DIAGNOSIS — E78.2 MIXED HYPERLIPIDEMIA: ICD-10-CM

## 2023-10-11 DIAGNOSIS — I10 ESSENTIAL HYPERTENSION: ICD-10-CM

## 2023-10-11 DIAGNOSIS — Z23 NEED FOR INFLUENZA VACCINATION: ICD-10-CM

## 2023-10-11 DIAGNOSIS — Z00.00 MEDICARE ANNUAL WELLNESS VISIT, SUBSEQUENT: Primary | ICD-10-CM

## 2023-10-11 DIAGNOSIS — R73.01 IMPAIRED FASTING GLUCOSE: ICD-10-CM

## 2023-10-11 DIAGNOSIS — D50.9 IRON DEFICIENCY ANEMIA, UNSPECIFIED IRON DEFICIENCY ANEMIA TYPE: ICD-10-CM

## 2023-10-11 DIAGNOSIS — D69.6 THROMBOCYTOPENIA (HCC): ICD-10-CM

## 2023-10-11 PROCEDURE — G0439 PPPS, SUBSEQ VISIT: HCPCS | Performed by: PHYSICIAN ASSISTANT

## 2023-10-11 PROCEDURE — 90662 IIV NO PRSV INCREASED AG IM: CPT | Performed by: PHYSICIAN ASSISTANT

## 2023-10-11 PROCEDURE — G0008 ADMIN INFLUENZA VIRUS VAC: HCPCS | Performed by: PHYSICIAN ASSISTANT

## 2023-10-11 PROCEDURE — 99214 OFFICE O/P EST MOD 30 MIN: CPT | Performed by: PHYSICIAN ASSISTANT

## 2023-10-11 NOTE — PROGRESS NOTES
Assessment and Plan:     Continue your current medications. Continue follow-up with specialist.  Please make sure you take your iron supplementation with vitamin C in the form of orange juice daily. Schedule follow-up in 6 months, sooner as needed. Depression Screening and Follow-up Plan: Patient was screened for depression during today's encounter. They screened negative with a PHQ-2 score of 0. Preventive health issues were discussed with patient, and age appropriate screening tests were ordered as noted in patient's After Visit Summary. Personalized health advice and appropriate referrals for health education or preventive services given if needed, as noted in patient's After Visit Summary. History of Present Illness:     Patient presents for a Medicare Wellness Visit. Questionnaire has been reviewed, clarified, and updated with the patient today. Follow-up, labs reviewed with patient    Hypertension: Good control on current medication lisinopril 5 mg daily, furosemide, potassium. Also on carvedilol. Estefani cp, palp, sob, edema, HA, dizziness, diaphoresis, syncope, visual disturbance. Patient will continue the medication. Hyperlipidemia: Labs show good control. At goal.  Patient will continue his rosuvastatin 20 mg daily. Iron deficiency anemia: On iron supplementation. H&H stable. Patient advised to take vitamin C with his iron in the form of orange juice. No complaint of lightheadedness or palpitations. Thrombocytopenia: Chronic. Stable. Continue to monitor. Coronary artery disease/paroxysmal atrial fibrillation/aortic valve replacement: Follows with cardiology. Stable. On Eliquis. Prostate cancer history: Follows with urology. Patient Care Team:  Maxwell Cheney MD as PCP - General     Review of Systems:     Review of Systems   Constitutional:  Negative for activity change, chills, fatigue and fever. HENT:  Negative for congestion.     Eyes:  Positive for visual disturbance (Wears glasses). Negative for discharge. Respiratory:  Negative for cough, chest tightness and shortness of breath. Cardiovascular:  Negative for chest pain, palpitations and leg swelling. Gastrointestinal:  Negative for abdominal pain, blood in stool, constipation, diarrhea, nausea and vomiting. Endocrine: Negative for polydipsia, polyphagia and polyuria. Genitourinary:  Negative for difficulty urinating. Musculoskeletal:  Negative for arthralgias and myalgias. Skin:  Negative for rash. Allergic/Immunologic: Negative for immunocompromised state. Neurological:  Negative for dizziness, syncope, weakness, light-headedness and headaches. Hematological:  Negative for adenopathy. Does not bruise/bleed easily. Psychiatric/Behavioral:  Negative for dysphoric mood, sleep disturbance and suicidal ideas. The patient is not nervous/anxious.          Problem List:     Patient Active Problem List   Diagnosis   • Chronic systolic heart failure ([address])   • Essential hypertension   • H/O aortic valve replacement with tissue graft   • Mixed hyperlipidemia   • Impaired fasting glucose   • Iron deficiency anemia   • Coronary artery disease involving native coronary artery of native heart without angina pectoris   • PAF (paroxysmal atrial fibrillation) ([facility])   • Transient amnesia   • Thrombocytopenia ([address])   • Osteoporosis      Past Medical and Surgical History:     Past Medical History:   Diagnosis Date   • Arthritis     HIP   • Cancer [facility])    • Cancer of prostate ([address]) 1998    [place]oved in [place]   • Heart attack ([address])    • High blood pressure    • High cholesterol    • Irregular heart beat     has ICD Pacemaker and Medication     Past Surgical History:   Procedure Laterality Date   • AORTIC VALVE REPLACEMENT     • CARDIAC DEFIBRILLATOR PLACEMENT      pulse generator   • CATARACT EXTRACTION, BILATERAL Bilateral    • HIP SURGERY Bilateral    • PROSTATE CANCER GENE 3(PCA3) (HISTORICAL)      removed 1998 Urology Dr. Kashif Parsons,    • PROSTATECTOMY        Family History:     Family History   Problem Relation Age of Onset   • Emphysema Mother    • Substance Abuse Other    • Cerebral aneurysm Brother    • Dementia Neg Hx       Social History:     Social History     Socioeconomic History   • Marital status: /Civil Union     Spouse name: None   • Number of children: None   • Years of education: None   • Highest education level: None   Occupational History   • Occupation: retired    Tobacco Use   • Smoking status: Never   • Smokeless tobacco: Never   Vaping Use   • Vaping Use: Never used   Substance and Sexual Activity   • Alcohol use: Yes     Comment: social   • Drug use: No   • Sexual activity: Not Currently     Comment: denied: history of high risk sexual behavior   Other Topics Concern   • None   Social History Narrative        2 children 3 step children    Retired-/Material Handling    No teeth    Hobbies-golfing     Social Determinants of Health     Financial Resource Strain: Low Risk  (10/11/2023)    Overall Financial Resource Strain (CARDIA)    • Difficulty of Paying Living Expenses: Not hard at all   Food Insecurity: Not on file   Transportation Needs: No Transportation Needs (10/11/2023)    PRAPARE - Transportation    • Lack of Transportation (Medical): No    • Lack of Transportation (Non-Medical):  No   Physical Activity: Not on file   Stress: Not on file   Social Connections: Not on file   Intimate Partner Violence: Not on file   Housing Stability: Not on file      Medications and Allergies:     Current Outpatient Medications   Medication Sig Dispense Refill   • carvedilol (COREG) 12.5 mg tablet Take 18.75 mg by mouth     • cyanocobalamin 100 MCG tablet Take 2,000 mcg by mouth     • ELIQUIS 5 MG Take 5 mg by mouth 2 (two) times a day  1   • ferrous sulfate 325 (65 Fe) mg tablet Take 325 mg by mouth daily with breakfast     • furosemide (LASIX) 20 mg tablet Take 40 mg by mouth daily     • Iron-Vitamin C (IRON 100/C PO) Take by mouth     • leuprolide (ELIGARD) 45 MG injection Inject 45 mg under the skin     • lifitegrast (XIIDRA) 5 % op solution Apply 1 drop to eye     • lisinopril (ZESTRIL) 5 mg tablet Take 5 mg by mouth     • Multiple Vitamin (MULTIVITAMIN PO) Take by mouth     • Omega-3 Fatty Acids (FISH OIL PO) Take 2,400 mg by mouth     • potassium chloride (KLOR-CON) 20 mEq packet Take 20 mEq by mouth daily     • rosuvastatin (CRESTOR) 20 MG tablet TAKE 1 TABLET DAILY 90 tablet 3     No current facility-administered medications for this visit. Allergies   Allergen Reactions   • Simvastatin Allergic Rhinitis      Immunizations:     Immunization History   Administered Date(s) Administered   • COVID-19 PFIZER VACCINE 0.3 ML IM 03/20/2021, 04/10/2021, 11/19/2021   • INFLUENZA 10/04/2022   • Influenza Split 01/14/2013, 09/23/2013   • Influenza Split High Dose Preservative Free IM 11/25/2015, 09/06/2016, 09/11/2017   • Influenza, high dose seasonal 0.7 mL 10/29/2018, 10/01/2019, 10/20/2020, 11/05/2021, 10/04/2022, 10/11/2023   • Influenza, seasonal, injectable 11/08/2010, 12/10/2014   • Influenza, seasonal, injectable, preservative free 10/19/2009, 11/21/2011   • Pneumococcal Conjugate 13-Valent 09/06/2016   • Pneumococcal Polysaccharide PPV23 05/10/2004, 05/29/2019   • Tdap 05/04/2011, 01/01/2016      Health Maintenance: There are no preventive care reminders to display for this patient. Topic Date Due   • COVID-19 Vaccine (4 - Pfizer series) 01/14/2022      Medicare Screening Tests and Risk Assessments:     Shaun Rivera is here for his Subsequent Wellness visit. Last Medicare Wellness visit information reviewed, patient interviewed and updates made to the record today. Health Risk Assessment:   Patient rates overall health as very good. Patient feels that their physical health rating is same. Patient is very satisfied with their life. Eyesight was rated as same. Hearing was rated as same. Patient feels that their emotional and mental health rating is same. Patients states they are never, rarely angry. Patient states they are sometimes unusually tired/fatigued. Pain experienced in the last 7 days has been none. Patient states that he has experienced no weight loss or gain in last 6 months. Depression Screening:   PHQ-2 Score: 0      Fall Risk Screening: In the past year, patient has experienced: no history of falling in past year      Home Safety:  Patient has trouble with stairs inside or outside of their home. Patient has working smoke alarms and has working carbon monoxide detector. Home safety hazards include: none. Nutrition:   Current diet is Regular. Medications:   Patient is not currently taking any over-the-counter supplements. Patient is able to manage medications. Activities of Daily Living (ADLs)/Instrumental Activities of Daily Living (IADLs):   Walk and transfer into and out of bed and chair?: Yes  Dress and groom yourself?: Yes    Bathe or shower yourself?: Yes    Feed yourself?  Yes  Do your laundry/housekeeping?: Yes  Manage your money, pay your bills and track your expenses?: Yes  Make your own meals?: Yes    Do your own shopping?: Yes    Previous Hospitalizations:   Any hospitalizations or ED visits within the last 12 months?: No      Advance Care Planning:   Living will: Yes    Advanced directive: Yes      Comments: Pt will bring copies to office    Cognitive Screening:   Provider or family/friend/caregiver concerned regarding cognition?: No    PREVENTIVE SCREENINGS      Cardiovascular Screening:    General: Screening Not Indicated, History Lipid Disorder and Screening Current    Due for: Lipid Panel      Diabetes Screening:     General: Screening Current    Due for: Blood Glucose      Colorectal Cancer Screening:     General: Screening Not Indicated      Prostate Cancer Screening:    General: History Prostate Cancer and Screening Not Indicated      Osteoporosis Screening:    General: Screening Not Indicated and History Osteoporosis      Abdominal Aortic Aneurysm (AAA) Screening:        General: Screening Not Indicated      Lung Cancer Screening:     General: Screening Not Indicated      Hepatitis C Screening:    General: Screening Not Indicated    Screening, Brief Intervention, and Referral to Treatment (SBIRT)    Screening  Typical number of drinks in a day: 0  Typical number of drinks in a week: 0  Interpretation: Low risk drinking behavior. Single Item Drug Screening:  How often have you used an illegal drug (including marijuana) or a prescription medication for non-medical reasons in the past year? never    Single Item Drug Screen Score: 0  Interpretation: Negative screen for possible drug use disorder    Brief Intervention  Alcohol & drug use screenings were reviewed. No concerns regarding substance use disorder identified. No results found. Physical Exam:     /60 (BP Location: Left arm, Patient Position: Sitting, Cuff Size: Adult)   Pulse 72   Resp 16   Ht 5' 7" (1.702 m)   Wt 84.6 kg (186 lb 9.6 oz)   SpO2 98%   BMI 29.23 kg/m²     Physical Exam  Vitals and nursing note reviewed. Constitutional:       General: He is not in acute distress. Appearance: Normal appearance. He is well-developed. He is not ill-appearing. HENT:      Head: Normocephalic and atraumatic. Right Ear: Tympanic membrane, ear canal and external ear normal.      Left Ear: Tympanic membrane, ear canal and external ear normal.      Nose: Nose normal.      Mouth/Throat:      Mouth: Mucous membranes are moist.      Pharynx: Oropharynx is clear. No oropharyngeal exudate or posterior oropharyngeal erythema. Eyes:      Extraocular Movements: Extraocular movements intact. Conjunctiva/sclera: Conjunctivae normal.      Pupils: Pupils are equal, round, and reactive to light.       Comments: Partial arcus senilis bilaterally   Neck:      Thyroid: No thyromegaly. Vascular: No carotid bruit or JVD. Cardiovascular:      Rate and Rhythm: Normal rate and regular rhythm. Heart sounds: No murmur heard. Pulmonary:      Effort: Pulmonary effort is normal. No respiratory distress. Breath sounds: Normal breath sounds. Chest:      Chest wall: No tenderness. Abdominal:      General: Abdomen is flat. Bowel sounds are normal. There is no distension. Palpations: Abdomen is soft. There is no hepatomegaly, splenomegaly or mass. Tenderness: There is no abdominal tenderness. There is no right CVA tenderness, left CVA tenderness, guarding or rebound. Hernia: No hernia is present. Genitourinary:     Comments: Deferred to urology  Musculoskeletal:         General: No swelling. Normal range of motion. Cervical back: Normal range of motion and neck supple. Right lower leg: No edema. Left lower leg: No edema. Lymphadenopathy:      Cervical: No cervical adenopathy. Skin:     General: Skin is warm and dry. Findings: No rash. Neurological:      General: No focal deficit present. Mental Status: He is alert and oriented to person, place, and time. Mental status is at baseline. Cranial Nerves: No cranial nerve deficit. Sensory: No sensory deficit. Motor: No weakness. Coordination: Coordination normal.      Gait: Gait normal.      Deep Tendon Reflexes: Reflexes normal.   Psychiatric:         Mood and Affect: Mood normal.         Behavior: Behavior normal.         Thought Content:  Thought content normal.         Judgment: Judgment normal.          Shan Herbert PA-C

## 2023-10-11 NOTE — PATIENT INSTRUCTIONS
Continue your current medications. Continue follow-up with specialist.  Please make sure you take your iron supplementation with vitamin C in the form of orange juice daily. Schedule follow-up in 6 months, sooner as needed. Medicare Preventive Visit Patient Instructions  Thank you for completing your Welcome to Medicare Visit or Medicare Annual Wellness Visit today. Your next wellness visit will be due in one year (10/11/2024). The screening/preventive services that you may require over the next 5-10 years are detailed below. Some tests may not apply to you based off risk factors and/or age. Screening tests ordered at today's visit but not completed yet may show as past due. Also, please note that scanned in results may not display below. Preventive Screenings:  Service Recommendations Previous Testing/Comments   Colorectal Cancer Screening  Colonoscopy    Fecal Occult Blood Test (FOBT)/Fecal Immunochemical Test (FIT)  Fecal DNA/Cologuard Test  Flexible Sigmoidoscopy Age: 43-73 years old   Colonoscopy: every 10 years (May be performed more frequently if at higher risk)  OR  FOBT/FIT: every 1 year  OR  Cologuard: every 3 years  OR  Sigmoidoscopy: every 5 years  Screening may be recommended earlier than age 39 if at higher risk for colorectal cancer. Also, an individualized decision between you and your healthcare provider will decide whether screening between the ages of 77-80 would be appropriate.  Colonoscopy: 04/25/2008  FOBT/FIT: Not on file  Cologuard: Not on file  Sigmoidoscopy: Not on file          Prostate Cancer Screening Individualized decision between patient and health care provider in men between ages of 53-66   Medicare will cover every 12 months beginning on the day after your 50th birthday PSA: <0.1 ng/mL           Hepatitis C Screening Once for adults born between [address] [place] [address]  More frequently in patients at high risk for Hepatitis C Hep C Antibody: Not on file        Diabetes Screening 1-2 times per year if you're at risk for diabetes or have pre-diabetes Fasting glucose: 138 mg/dL (10/5/2023)  A1C: 6.3 % (10/5/2023)      Cholesterol Screening Once every 5 years if you don't have a lipid disorder. May order more often based on risk factors. Lipid panel: 10/05/2023         Other Preventive Screenings Covered by Medicare:  Abdominal Aortic Aneurysm (AAA) Screening: covered once if your at risk. You're considered to be at risk if you have a family history of AAA or a male between the age of 70-76 who smoking at least 100 cigarettes in your lifetime. Lung Cancer Screening: covers low dose CT scan once per year if you meet all of the following conditions: (1) Age 48-67; (2) No signs or symptoms of lung cancer; (3) Current smoker or have quit smoking within the last 15 years; (4) You have a tobacco smoking history of at least 20 pack years (packs per day x number of years you smoked); (5) You get a written order from a healthcare provider. Glaucoma Screening: covered annually if you're considered high risk: (1) You have diabetes OR (2) Family history of glaucoma OR (3)  aged 48 and older OR (3)  American aged 72 and older  Osteoporosis Screening: covered every 2 years if you meet one of the following conditions: (1) Have a vertebral abnormality; (2) On glucocorticoid therapy for more than 3 months; (3) Have primary hyperparathyroidism; (4) On osteoporosis medications and need to assess response to drug therapy. HIV Screening: covered annually if you're between the age of 14-79. Also covered annually if you are younger than 13 and older than 72 with risk factors for HIV infection. For pregnant patients, it is covered up to 3 times per pregnancy.     Immunizations:  Immunization Recommendations   Influenza Vaccine Annual influenza vaccination during flu season is recommended for all persons aged >= 6 months who do not have contraindications   Pneumococcal Vaccine   * Pneumococcal conjugate vaccine = PCV13 (Prevnar 13), PCV15 (Vaxneuvance), PCV20 (Prevnar 20)  * Pneumococcal polysaccharide vaccine = PPSV23 (Pneumovax) Adults 95-46 yo with certain risk factors or if 69+ yo  If never received any pneumonia vaccine: recommend Prevnar 20 (PCV20)  Give PCV20 if previously received 1 dose of PCV13 or PPSV23   Hepatitis B Vaccine 3 dose series if at intermediate or high risk (ex: diabetes, end stage renal disease, liver disease)   Respiratory syncytial virus (RSV) Vaccine - COVERED BY MEDICARE PART D  * RSVPreF3 (Arexvy) CDC recommends that adults 61years of age and older may receive a single dose of RSV vaccine using shared clinical decision-making (SCDM)   Tetanus (Td) Vaccine - COST NOT COVERED BY MEDICARE PART B Following completion of primary series, a booster dose should be given every 10 years to maintain immunity against tetanus. Td may also be given as tetanus wound prophylaxis. Tdap Vaccine - COST NOT COVERED BY MEDICARE PART B Recommended at least once for all adults. For pregnant patients, recommended with each pregnancy. Shingles Vaccine (Shingrix) - COST NOT COVERED BY MEDICARE PART B  2 shot series recommended in those 19 years and older who have or will have weakened immune systems or those 50 years and older     Health Maintenance Due:  There are no preventive care reminders to display for this patient. Immunizations Due:      Topic Date Due    COVID-19 Vaccine (4 - Pfizer series) 01/14/2022    Influenza Vaccine (1) 09/01/2023     Advance Directives   What are advance directives? Advance directives are legal documents that state your wishes and plans for medical care. These plans are made ahead of time in case you lose your ability to make decisions for yourself. Advance directives can apply to any medical decision, such as the treatments you want, and if you want to donate organs. What are the types of advance directives?   There are many types of advance directives, and each state has rules about how to use them. You may choose a combination of any of the following:  Living will: This is a written record of the treatment you want. You can also choose which treatments you do not want, which to limit, and which to stop at a certain time. This includes surgery, medicine, IV fluid, and tube feedings. Durable power of  for [facility]): This is a written record that states who you want to make healthcare choices for you when you are unable to make them for yourself. This person, called a proxy, is usually a family member or a friend. You may choose more than 1 proxy. Do not resuscitate (DNR) order:  A DNR order is used in case your heart stops beating or you stop breathing. It is a request not to have certain forms of treatment, such as CPR. A DNR order may be included in other types of advance directives. Medical directive: This covers the care that you want if you are in a coma, near death, or unable to make decisions for yourself. You can list the treatments you want for each condition. Treatment may include pain medicine, surgery, blood transfusions, dialysis, IV or tube feedings, and a ventilator (breathing machine). Values history: This document has questions about your views, beliefs, and how you feel and think about life. This information can help others choose the care that you would choose. Why are advance directives important? An advance directive helps you control your care. Although spoken wishes may be used, it is better to have your wishes written down. Spoken wishes can be misunderstood, or not followed. Treatments may be given even if you do not want them. An advance directive may make it easier for your family to make difficult choices about your care.    Weight Management   Why it is important to manage your weight:  Being overweight increases your risk of health conditions such as heart disease, high blood pressure, type 2 diabetes, and certain types of cancer. It can also increase your risk for osteoarthritis, sleep apnea, and other respiratory problems. Aim for a slow, steady weight loss. Even a small amount of weight loss can lower your risk of health problems. How to lose weight safely:  A safe and healthy way to lose weight is to eat fewer calories and get regular exercise. You can lose up about 1 pound a week by decreasing the number of calories you eat by 500 calories each day. Healthy meal plan for weight management:  A healthy meal plan includes a variety of foods, contains fewer calories, and helps you stay healthy. A healthy meal plan includes the following:  Eat whole-grain foods more often. A healthy meal plan should contain fiber. Fiber is the part of grains, fruits, and vegetables that is not broken down by your body. Whole-grain foods are healthy and provide extra fiber in your diet. Some examples of whole-grain foods are whole-wheat breads and pastas, oatmeal, brown rice, and bulgur. Eat a variety of vegetables every day. Include dark, leafy greens such as spinach, kale, moncho greens, and mustard greens. Eat yellow and orange vegetables such as carrots, sweet potatoes, and winter squash. Eat a variety of fruits every day. Choose fresh or canned fruit (canned in its own juice or light syrup) instead of juice. Fruit juice has very little or no fiber. Eat low-fat dairy foods. Drink fat-free (skim) milk or 1% milk. Eat fat-free yogurt and low-fat cottage cheese. Try low-fat cheeses such as mozzarella and other reduced-fat cheeses. Choose meat and other protein foods that are low in fat. Choose beans or other legumes such as split peas or lentils. Choose fish, skinless poultry (chicken or turkey), or lean cuts of red meat (beef or pork). Before you cook meat or poultry, cut off any visible fat. Use less fat and oil. Try baking foods instead of frying them.  Add less fat, such as margarine, sour cream, regular salad dressing and mayonnaise to foods. Eat fewer high-fat foods. Some examples of high-fat foods include french fries, doughnuts, ice cream, and cakes. Eat fewer sweets. Limit foods and drinks that are high in sugar. This includes candy, cookies, regular soda, and sweetened drinks. Exercise:  Exercise at least 30 minutes per day on most days of the week. Some examples of exercise include walking, biking, dancing, and swimming. You can also fit in more physical activity by taking the stairs instead of the elevator or parking farther away from stores. Ask your healthcare provider about the best exercise plan for you. © Copyright [employer] 2018 Information is for End User's use only and may not be sold, redistributed or otherwise used for commercial purposes.  All illustrations and images included in CareNotes® are the copyrighted property of [employer].D.A[employer]M., Inc. or [address] [place] [address]

## 2023-10-24 DIAGNOSIS — I48.0 PAF (PAROXYSMAL ATRIAL FIBRILLATION) (HCC): Primary | ICD-10-CM

## 2023-10-24 RX ORDER — APIXABAN 5 MG/1
5 TABLET, FILM COATED ORAL 2 TIMES DAILY
Qty: 60 TABLET | Refills: 3 | Status: SHIPPED | OUTPATIENT
Start: 2023-10-24

## 2023-10-24 NOTE — TELEPHONE ENCOUNTER
Eliquis 5 mg tablets-take 1 tablet by mouth twice a day    90 day supply w/refills    [facility]    This used to be ordered by Dr Addison Sanches but patient would like Elkin Rivers to take over this medication

## 2023-11-14 ENCOUNTER — TELEPHONE (OUTPATIENT)
Dept: INTERNAL MEDICINE CLINIC | Facility: CLINIC | Age: 88
End: 2023-11-14

## 2023-11-14 DIAGNOSIS — I48.0 PAF (PAROXYSMAL ATRIAL FIBRILLATION) (HCC): ICD-10-CM

## 2023-11-14 RX ORDER — APIXABAN 5 MG/1
5 TABLET, FILM COATED ORAL 2 TIMES DAILY
Qty: 90 TABLET | Refills: 1 | Status: SHIPPED | OUTPATIENT
Start: 2023-11-14

## 2024-01-01 ENCOUNTER — TELEPHONE (OUTPATIENT)
Dept: INTERNAL MEDICINE CLINIC | Facility: CLINIC | Age: 89
End: 2024-01-01

## 2024-01-10 DIAGNOSIS — I48.0 PAF (PAROXYSMAL ATRIAL FIBRILLATION) (HCC): ICD-10-CM

## 2024-01-10 RX ORDER — APIXABAN 5 MG/1
5 TABLET, FILM COATED ORAL 2 TIMES DAILY
Qty: 180 TABLET | Refills: 1 | Status: SHIPPED | OUTPATIENT
Start: 2024-01-10

## 2024-03-12 ENCOUNTER — TELEPHONE (OUTPATIENT)
Dept: INTERNAL MEDICINE CLINIC | Facility: CLINIC | Age: 89
End: 2024-03-12

## 2024-03-12 NOTE — TELEPHONE ENCOUNTER
Patient would like to know if you can refer him to an Audiologist. Patient can not get his right hearing aid in and feels like he may a growth in it.    Please advise......

## 2024-03-13 ENCOUNTER — OFFICE VISIT (OUTPATIENT)
Dept: INTERNAL MEDICINE CLINIC | Facility: CLINIC | Age: 89
End: 2024-03-13
Payer: MEDICARE

## 2024-03-13 VITALS
OXYGEN SATURATION: 97 % | HEART RATE: 78 BPM | SYSTOLIC BLOOD PRESSURE: 132 MMHG | RESPIRATION RATE: 16 BRPM | HEIGHT: 67 IN | DIASTOLIC BLOOD PRESSURE: 64 MMHG | BODY MASS INDEX: 29.23 KG/M2 | TEMPERATURE: 98 F

## 2024-03-13 DIAGNOSIS — Z46.1 HEARING AID FITTING OR ADJUSTMENT: Primary | ICD-10-CM

## 2024-03-13 PROCEDURE — G2211 COMPLEX E/M VISIT ADD ON: HCPCS | Performed by: PHYSICIAN ASSISTANT

## 2024-03-13 PROCEDURE — 99213 OFFICE O/P EST LOW 20 MIN: CPT | Performed by: PHYSICIAN ASSISTANT

## 2024-03-13 NOTE — PATIENT INSTRUCTIONS
Insert hearing aid as we discussed and demonstrated.  Keep me informed if it continues to be a problem and I will refer you to ear nose and throat.  Otherwise follow-up as scheduled.

## 2024-03-13 NOTE — PROGRESS NOTES
Assessment/Plan:      Quality Measures:   Depression Screening and Follow-up Plan: Patient was screened for depression during today's encounter. They screened negative with a PHQ-2 score of 0.         Return if symptoms worsen or fail to improve, for Next scheduled follow up.         Diagnoses and all orders for this visit:    Hearing aid fitting or adjustment          Subjective:      Patient ID: Bryson Cruz is a 89 y.o. male.    Acute visit    Patient called because he is having trouble with his right hearing aid.  He states he is having difficulty getting it in as far as his left.  He feels as if there could be an obstruction.  He is not having any ear pain or ear drainage.  Left hearing aid is fitting well as it had in the past.        ALLERGIES:  Allergies   Allergen Reactions   • Simvastatin Allergic Rhinitis       CURRENT MEDICATIONS:    Current Outpatient Medications:   •  carvedilol (COREG) 12.5 mg tablet, Take 18.75 mg by mouth, Disp: , Rfl:   •  cyanocobalamin 100 MCG tablet, Take 2,000 mcg by mouth, Disp: , Rfl:   •  Eliquis 5 MG, TAKE 1 TABLET TWICE A DAY, Disp: 180 tablet, Rfl: 1  •  ferrous sulfate 325 (65 Fe) mg tablet, Take 325 mg by mouth daily with breakfast, Disp: , Rfl:   •  furosemide (LASIX) 20 mg tablet, Take 40 mg by mouth daily, Disp: , Rfl:   •  Iron-Vitamin C (IRON 100/C PO), Take by mouth, Disp: , Rfl:   •  leuprolide (ELIGARD) 45 MG injection, Inject 45 mg under the skin, Disp: , Rfl:   •  lifitegrast (XIIDRA) 5 % op solution, Apply 1 drop to eye, Disp: , Rfl:   •  lisinopril (ZESTRIL) 5 mg tablet, Take 5 mg by mouth, Disp: , Rfl:   •  Multiple Vitamin (MULTIVITAMIN PO), Take by mouth, Disp: , Rfl:   •  Omega-3 Fatty Acids (FISH OIL PO), Take 2,400 mg by mouth, Disp: , Rfl:   •  potassium chloride (KLOR-CON) 20 mEq packet, Take 20 mEq by mouth daily, Disp: , Rfl:   •  rosuvastatin (CRESTOR) 20 MG tablet, TAKE 1 TABLET DAILY, Disp: 90 tablet, Rfl: 3    ACTIVE PROBLEM LIST:  Patient  Active Problem List   Diagnosis   • Chronic systolic heart failure (HCC)   • Essential hypertension   • H/O aortic valve replacement with tissue graft   • Mixed hyperlipidemia   • Impaired fasting glucose   • Iron deficiency anemia   • Coronary artery disease involving native coronary artery of native heart without angina pectoris   • PAF (paroxysmal atrial fibrillation) (HCC)   • Transient amnesia   • Thrombocytopenia (HCC)   • Osteoporosis       PAST MEDICAL HISTORY:  Past Medical History:   Diagnosis Date   • Arthritis     HIP   • Cancer (HCC)    • Cancer of prostate (HCC) 1998    removed in 1998, [facility] Dr. SOTERO Islas   • Heart attack (HCC)    • High blood pressure    • High cholesterol    • Irregular heart beat     has ICD Pacemaker and Medication       PAST SURGICAL HISTORY:  Past Surgical History:   Procedure Laterality Date   • AORTIC VALVE REPLACEMENT     • CARDIAC DEFIBRILLATOR PLACEMENT      pulse generator   • CATARACT EXTRACTION, BILATERAL Bilateral    • HIP SURGERY Bilateral    • PROSTATE CANCER GENE 3(PCA3) (HISTORICAL)      removed 1998 Urology Dr. ERNESTO Islas,    • PROSTATECTOMY         FAMILY HISTORY:  Family History   Problem Relation Age of Onset   • Emphysema Mother    • Substance Abuse Other    • Cerebral aneurysm Brother    • Dementia Neg Hx        SOCIAL HISTORY:  Social History     Socioeconomic History   • Marital status: /Civil Union     Spouse name: Not on file   • Number of children: Not on file   • Years of education: Not on file   • Highest education level: Not on file   Occupational History   • Occupation: retired    Tobacco Use   • Smoking status: Never   • Smokeless tobacco: Never   Vaping Use   • Vaping status: Never Used   Substance and Sexual Activity   • Alcohol use: Yes     Comment: social   • Drug use: No   • Sexual activity: Not Currently     Comment: denied: history of high risk sexual behavior   Other Topics Concern   • Not on file   Social History  "Narrative        2 children 3 step children    Retired-/Material Handling    No teeth    Hobbies-golfing     Social Determinants of Health     Financial Resource Strain: Low Risk  (10/11/2023)    Overall Financial Resource Strain (CARDIA)    • Difficulty of Paying Living Expenses: Not hard at all   Food Insecurity: Not on file   Transportation Needs: No Transportation Needs (10/11/2023)    PRAPARE - Transportation    • Lack of Transportation (Medical): No    • Lack of Transportation (Non-Medical): No   Physical Activity: Not on file   Stress: Not on file   Social Connections: Not on file   Intimate Partner Violence: Not on file   Housing Stability: Not on file       Review of Systems   HENT:  Positive for hearing loss (Chronic). Negative for ear discharge and ear pain.          Objective:  Vitals:    03/13/24 1450   BP: 132/64   BP Location: Left arm   Patient Position: Sitting   Cuff Size: Adult   Pulse: 78   Resp: 16   Temp: 98 °F (36.7 °C)   TempSrc: Tympanic   SpO2: 97%   Height: 5' 7\" (1.702 m)     Body mass index is 29.23 kg/m².     Physical Exam  Vitals and nursing note reviewed.   Constitutional:       General: He is not in acute distress.     Appearance: He is well-developed. He is not ill-appearing.   HENT:      Head: Normocephalic and atraumatic.      Comments: Cannot see any anatomic reason for hearing aid not to be going in as it had in the past.  Patient and I were successful in getting it positioned.  He will continue to try our technique at home.  If unsuccessful he will let me know and I will refer him to either ENT or audiology.     Right Ear: Tympanic membrane, ear canal and external ear normal.      Left Ear: Tympanic membrane, ear canal and external ear normal.   Eyes:      Extraocular Movements: Extraocular movements intact.      Pupils: Pupils are equal, round, and reactive to light.   Pulmonary:      Effort: Pulmonary effort is normal. No respiratory distress.      Breath " sounds: Normal breath sounds.   Skin:     General: Skin is warm and dry.   Neurological:      General: No focal deficit present.      Mental Status: He is alert and oriented to person, place, and time. Mental status is at baseline.   Psychiatric:         Mood and Affect: Mood normal.         Behavior: Behavior normal.           RESULTS:  Hemoglobin A1C   Date/Time Value Ref Range Status   10/05/2023 08:48 AM 6.3 (H) Normal 4.0-5.6%; PreDiabetic 5.7-6.4%; Diabetic >=6.5%; Glycemic control for adults with diabetes <7.0% % Final     Cholesterol   Date/Time Value Ref Range Status   10/05/2023 08:48  See Comment mg/dL Final     Comment:     Cholesterol:         Pediatric <18 Years        Desirable          <170 mg/dL      Borderline High    170-199 mg/dL      High               >=200 mg/dL        Adult >=18 Years            Desirable        <200 mg/dL      Borderline High  200-239 mg/dL      High             >239 mg/dL       Triglycerides   Date/Time Value Ref Range Status   10/05/2023 08:48  See Comment mg/dL Final     Comment:     Triglyceride:     0-9Y            <75mg/dL     10Y-17Y         <90 mg/dL       >=18Y     Normal          <150 mg/dL     Borderline High 150-199 mg/dL     High            200-499 mg/dL        Very High       >499 mg/dL    Specimen collection should occur prior to Metamizole administration due to the potential for falsely depressed results.   12/03/2015 08:51  mg/dL Final     Comment:     TRIGLYCERIDE:       Normal              <150 mg/dl       Borderline High    150-199 mg/dl       High               200-499 mg/dl       Very High          >499 mg/dl  _______________________________________       HDL   Date/Time Value Ref Range Status   12/03/2015 08:51 AM 52 mg/dL Final     Comment:     HDL:       High       >59 mg/dl       Low        <41 mg/dl  ______________________________       HDL, Direct   Date/Time Value Ref Range Status   10/05/2023 08:48 AM 53 >=40 mg/dL Final      LDL Calculated   Date/Time Value Ref Range Status   10/05/2023 08:48 AM 54 0 - 100 mg/dL Final     Comment:     LDL Cholesterol:     Optimal           <100 mg/dl     Near Optimal      100-129 mg/dl     Above Optimal       Borderline High 130-159 mg/dl       High            160-189 mg/dl       Very High       >189 mg/dl         This screening LDL is a calculated result.   It does not have the accuracy of the Direct Measured LDL in the monitoring of patients with hyperlipidemia and/or statin therapy.   Direct Measure LDL (YIM788) must be ordered separately in these patients.   12/03/2015 08:51 AM 69 0 - 100 mg/dL Final     Comment:     LDL, CALCULATED:     This screening LDL is a calculated result.  It does not have the accuracy of the Direct Measured LDL in the  monitoring of patients with hyperlipidemia and/or statin therapy.  Direct Measured LDL (Test Code 3126) must be ordered separately in these  patients.  ______________________________  The above 4 analytes were performed by [facility]  [address] [place] [id number]       Hemoglobin   Date/Time Value Ref Range Status   10/05/2023 08:48 AM 11.1 (L) 12.0 - 17.0 g/dL Final   11/18/2015 08:57 AM 13.2 12.0 - 17.0 g/dL Final     Hematocrit   Date/Time Value Ref Range Status   10/05/2023 08:48 AM 32.8 (L) 36.5 - 49.3 % Final   11/18/2015 08:57 AM 39.1 36.5 - 49.3 % Final     Platelets   Date/Time Value Ref Range Status   10/05/2023 08:48  (L) 149 - 390 Thousands/uL Final   11/18/2015 08:57  149 - 390 Thousand/uL Final     PSA   Date/Time Value Ref Range Status   12/05/2016 08:49 AM <0.1 0.0 - 4.0 ng/mL Final     Comment:       American Urological Association Guidelines define biochemical recurrence of prostate cancer as a detectable or rising PSA value post-radical prostatectomy that is greater than or equal to 0.2 ng/mL with a second confirmatory level of greater than or equal to 0.2 ng/mL.   12/03/2015 08:53 AM <0.1 0.0 - 4.0 ng/mL Final      Comment:     PSA values from one laboratory may not be comparable to those from  another because of the various testing technologies employed.  Additionally, PSA results can not be interpreted as absolute evidence of  the presence or absence of disease. This PSA value was obtained by [employer] Chemiluminometric Immunoassay.  The above 1 analytes were performed by [address],[place] [address]       PSA, Diagnostic   Date/Time Value Ref Range Status   02/23/2023 08:32 AM <0.1 0.0 - 4.0 ng/mL Final     Comment:     American Urological Association Guidelines define biochemical recurrence of prostate cancer as a detectable or rising PSA value post-radical prostatectomy that is greater than or equal to 0.2 ng/mL with a second confirmatory level of greater than or equal to 0.2 ng/mL.     TSH 3RD GENERATON   Date/Time Value Ref Range Status   09/09/2020 11:22 AM 1.340 0.358 - 3.740 uIU/mL Final     Comment:     Using supplements with high doses of biotin 20 to more than 300 times greater than the adequate daily intake for adults of 30 mcg/day as established by the [place] [employer], can cause falsely depress results.     Sodium   Date/Time Value Ref Range Status   10/05/2023 08:48  135 - 147 mmol/L Final   09/08/2023 07:58  135 - 145 mmol/L Final     BUN   Date/Time Value Ref Range Status   10/05/2023 08:48 AM 31 (H) 5 - 25 mg/dL Final   09/08/2023 07:58 AM 21 7 - 28 mg/dL Final     Creatinine   Date/Time Value Ref Range Status   10/05/2023 08:48 AM 1.00 0.60 - 1.30 mg/dL Final     Comment:     Standardized to IDMS reference method   09/08/2023 07:58 AM 1.08 0.53 - 1.30 mg/dL Final      In chart    This note was created with voice recognition software.  Phonic, grammatical and spelling errors may be present within the note as a result.

## 2024-04-10 ENCOUNTER — APPOINTMENT (OUTPATIENT)
Dept: LAB | Facility: CLINIC | Age: 89
End: 2024-04-10
Payer: MEDICARE

## 2024-04-10 ENCOUNTER — RA CDI HCC (OUTPATIENT)
Dept: OTHER | Facility: HOSPITAL | Age: 89
End: 2024-04-10

## 2024-04-10 DIAGNOSIS — D50.9 IRON DEFICIENCY ANEMIA, UNSPECIFIED IRON DEFICIENCY ANEMIA TYPE: ICD-10-CM

## 2024-04-10 DIAGNOSIS — R73.01 IMPAIRED FASTING GLUCOSE: ICD-10-CM

## 2024-04-10 DIAGNOSIS — E78.2 MIXED HYPERLIPIDEMIA: ICD-10-CM

## 2024-04-10 LAB
ALBUMIN SERPL BCP-MCNC: 4 G/DL (ref 3.5–5)
ALP SERPL-CCNC: 39 U/L (ref 34–104)
ALT SERPL W P-5'-P-CCNC: 12 U/L (ref 7–52)
ANION GAP SERPL CALCULATED.3IONS-SCNC: 8 MMOL/L (ref 4–13)
AST SERPL W P-5'-P-CCNC: 20 U/L (ref 13–39)
BASOPHILS # BLD AUTO: 0.05 THOUSANDS/ÂΜL (ref 0–0.1)
BASOPHILS NFR BLD AUTO: 1 % (ref 0–1)
BILIRUB SERPL-MCNC: 0.68 MG/DL (ref 0.2–1)
BUN SERPL-MCNC: 17 MG/DL (ref 5–25)
CALCIUM SERPL-MCNC: 8.7 MG/DL (ref 8.4–10.2)
CHLORIDE SERPL-SCNC: 108 MMOL/L (ref 96–108)
CHOLEST SERPL-MCNC: 139 MG/DL
CO2 SERPL-SCNC: 25 MMOL/L (ref 21–32)
CREAT SERPL-MCNC: 0.92 MG/DL (ref 0.6–1.3)
EOSINOPHIL # BLD AUTO: 0.12 THOUSAND/ÂΜL (ref 0–0.61)
EOSINOPHIL NFR BLD AUTO: 2 % (ref 0–6)
ERYTHROCYTE [DISTWIDTH] IN BLOOD BY AUTOMATED COUNT: 12.9 % (ref 11.6–15.1)
EST. AVERAGE GLUCOSE BLD GHB EST-MCNC: 126 MG/DL
GFR SERPL CREATININE-BSD FRML MDRD: 73 ML/MIN/1.73SQ M
GLUCOSE P FAST SERPL-MCNC: 106 MG/DL (ref 65–99)
HBA1C MFR BLD: 6 %
HCT VFR BLD AUTO: 33.9 % (ref 36.5–49.3)
HDLC SERPL-MCNC: 54 MG/DL
HGB BLD-MCNC: 11.4 G/DL (ref 12–17)
IMM GRANULOCYTES # BLD AUTO: 0.02 THOUSAND/UL (ref 0–0.2)
IMM GRANULOCYTES NFR BLD AUTO: 0 % (ref 0–2)
LDLC SERPL CALC-MCNC: 73 MG/DL (ref 0–100)
LYMPHOCYTES # BLD AUTO: 1.1 THOUSANDS/ÂΜL (ref 0.6–4.47)
LYMPHOCYTES NFR BLD AUTO: 20 % (ref 14–44)
MCH RBC QN AUTO: 32.1 PG (ref 26.8–34.3)
MCHC RBC AUTO-ENTMCNC: 33.6 G/DL (ref 31.4–37.4)
MCV RBC AUTO: 96 FL (ref 82–98)
MONOCYTES # BLD AUTO: 0.52 THOUSAND/ÂΜL (ref 0.17–1.22)
MONOCYTES NFR BLD AUTO: 9 % (ref 4–12)
NEUTROPHILS # BLD AUTO: 3.77 THOUSANDS/ÂΜL (ref 1.85–7.62)
NEUTS SEG NFR BLD AUTO: 68 % (ref 43–75)
NONHDLC SERPL-MCNC: 85 MG/DL
NRBC BLD AUTO-RTO: 0 /100 WBCS
PLATELET # BLD AUTO: 133 THOUSANDS/UL (ref 149–390)
PMV BLD AUTO: 10.5 FL (ref 8.9–12.7)
POTASSIUM SERPL-SCNC: 4.5 MMOL/L (ref 3.5–5.3)
PROT SERPL-MCNC: 6.3 G/DL (ref 6.4–8.4)
RBC # BLD AUTO: 3.55 MILLION/UL (ref 3.88–5.62)
SODIUM SERPL-SCNC: 141 MMOL/L (ref 135–147)
TRIGL SERPL-MCNC: 59 MG/DL
WBC # BLD AUTO: 5.58 THOUSAND/UL (ref 4.31–10.16)

## 2024-04-10 PROCEDURE — 83036 HEMOGLOBIN GLYCOSYLATED A1C: CPT

## 2024-04-10 PROCEDURE — 80061 LIPID PANEL: CPT

## 2024-04-10 PROCEDURE — 36415 COLL VENOUS BLD VENIPUNCTURE: CPT

## 2024-04-10 PROCEDURE — 80053 COMPREHEN METABOLIC PANEL: CPT

## 2024-04-10 PROCEDURE — 85025 COMPLETE CBC W/AUTO DIFF WBC: CPT

## 2024-04-15 ENCOUNTER — TELEPHONE (OUTPATIENT)
Dept: ADMINISTRATIVE | Facility: OTHER | Age: 89
End: 2024-04-15

## 2024-04-15 NOTE — TELEPHONE ENCOUNTER
04/15/24 3:53 PM    Patient contacted (spoke with patients' wife) to bring Advance Directive, POLST, or Living Will document to next scheduled pcp visit.    Thank you.  Zunilda Stack MA  [facility]

## 2024-04-17 ENCOUNTER — OFFICE VISIT (OUTPATIENT)
Dept: INTERNAL MEDICINE CLINIC | Facility: CLINIC | Age: 89
End: 2024-04-17
Payer: MEDICARE

## 2024-04-17 VITALS
TEMPERATURE: 99.8 F | BODY MASS INDEX: 30.73 KG/M2 | OXYGEN SATURATION: 97 % | WEIGHT: 195.8 LBS | SYSTOLIC BLOOD PRESSURE: 112 MMHG | RESPIRATION RATE: 18 BRPM | DIASTOLIC BLOOD PRESSURE: 52 MMHG | HEART RATE: 68 BPM | HEIGHT: 67 IN

## 2024-04-17 DIAGNOSIS — I48.0 PAF (PAROXYSMAL ATRIAL FIBRILLATION) (HCC): ICD-10-CM

## 2024-04-17 DIAGNOSIS — D50.9 IRON DEFICIENCY ANEMIA, UNSPECIFIED IRON DEFICIENCY ANEMIA TYPE: ICD-10-CM

## 2024-04-17 DIAGNOSIS — I50.22 CHRONIC SYSTOLIC HEART FAILURE (HCC): Primary | ICD-10-CM

## 2024-04-17 DIAGNOSIS — R73.01 IMPAIRED FASTING GLUCOSE: ICD-10-CM

## 2024-04-17 DIAGNOSIS — E78.2 MIXED HYPERLIPIDEMIA: ICD-10-CM

## 2024-04-17 DIAGNOSIS — D69.6 THROMBOCYTOPENIA (HCC): ICD-10-CM

## 2024-04-17 DIAGNOSIS — I10 ESSENTIAL HYPERTENSION: ICD-10-CM

## 2024-04-17 PROCEDURE — 99214 OFFICE O/P EST MOD 30 MIN: CPT | Performed by: PHYSICIAN ASSISTANT

## 2024-04-17 PROCEDURE — G2211 COMPLEX E/M VISIT ADD ON: HCPCS | Performed by: PHYSICIAN ASSISTANT

## 2024-04-17 RX ORDER — POTASSIUM CHLORIDE 20 MEQ/1
TABLET, EXTENDED RELEASE ORAL
COMMUNITY
Start: 2024-03-17

## 2024-04-17 NOTE — PATIENT INSTRUCTIONS
Continue current medications.  Continue follow-up with specialist.  Plan follow-up here in 6 months to be Medicare annual wellness visit plus routine visit.  Labs to do for that visit.  Follow-up sooner as needed.  Contact your pharmacist regarding shingles vaccine.

## 2024-04-17 NOTE — PROGRESS NOTES
Assessment/Plan:   Patient Instructions   Continue current medications.  Continue follow-up with specialist.  Plan follow-up here in 6 months to be Medicare annual wellness visit plus routine visit.  Labs to do for that visit.  Follow-up sooner as needed.  Contact your pharmacist regarding shingles vaccine.     Quality Measures:       Return in about 6 months (around 10/17/2024) for Regular Visit + Medicare Annual Wellness.         Diagnoses and all orders for this visit:    Chronic systolic heart failure (HCC)    PAF (paroxysmal atrial fibrillation) (HCC)    Essential hypertension  -     Comprehensive metabolic panel; Future    Thrombocytopenia (HCC)    Impaired fasting glucose  -     Hemoglobin A1C; Future    Iron deficiency anemia, unspecified iron deficiency anemia type  -     CBC and differential; Future    Mixed hyperlipidemia  -     Lipid panel; Future    Other orders  -     potassium chloride (Klor-Con M20) 20 mEq tablet          Subjective:      Patient ID: Bryson Cruz is a 89 y.o. male.    Follow-up, labs reviewed with patient    Chronic systolic heart failure: Followed by cardiology.  On carvedilol, furosemide, and lisinopril.  Doing well.  No complaint of shortness of breath.  Denies PND.    Paroxysmal atrial fibrillation: On Eliquis, beta-blocker.  Follows with cardiology.    Hypertension: Well-controlled.  On the above medications.  He will continue the medications.  Estefani cp, palp, sob, edema, HA, dizziness, diaphoresis, syncope, visual disturbance.    Thrombocytopenia: Stable.    Iron deficiency anemia: Patient takes iron daily.  CBC is stable.    Impaired fasting glucose: A1c 6.0, .  A1c improved this visit.  He will continue diet modifications.    Hyperlipidemia: On rosuvastatin.  Labs show good control.    No new concerns.  We did discuss Shingrix vaccine.  We cannot find any documentation that he received this.  I am recommending that he get this.        ALLERGIES:  Allergies    Allergen Reactions   • Simvastatin Allergic Rhinitis       CURRENT MEDICATIONS:    Current Outpatient Medications:   •  carvedilol (COREG) 12.5 mg tablet, Take 18.75 mg by mouth, Disp: , Rfl:   •  cyanocobalamin 100 MCG tablet, Take 2,000 mcg by mouth, Disp: , Rfl:   •  Eliquis 5 MG, TAKE 1 TABLET TWICE A DAY, Disp: 180 tablet, Rfl: 1  •  ferrous sulfate 325 (65 Fe) mg tablet, Take 325 mg by mouth daily with breakfast, Disp: , Rfl:   •  furosemide (LASIX) 20 mg tablet, Take 40 mg by mouth daily, Disp: , Rfl:   •  Iron-Vitamin C (IRON 100/C PO), Take by mouth, Disp: , Rfl:   •  leuprolide (ELIGARD) 45 MG injection, Inject 45 mg under the skin, Disp: , Rfl:   •  lifitegrast (XIIDRA) 5 % op solution, Apply 1 drop to eye, Disp: , Rfl:   •  lisinopril (ZESTRIL) 5 mg tablet, Take 5 mg by mouth, Disp: , Rfl:   •  Multiple Vitamin (MULTIVITAMIN PO), Take by mouth, Disp: , Rfl:   •  Omega-3 Fatty Acids (FISH OIL PO), Take 2,400 mg by mouth, Disp: , Rfl:   •  potassium chloride (Klor-Con M20) 20 mEq tablet, , Disp: , Rfl:   •  potassium chloride (KLOR-CON) 20 mEq packet, Take 20 mEq by mouth daily, Disp: , Rfl:   •  rosuvastatin (CRESTOR) 20 MG tablet, TAKE 1 TABLET DAILY, Disp: 90 tablet, Rfl: 3    ACTIVE PROBLEM LIST:  Patient Active Problem List   Diagnosis   • Chronic systolic heart failure (HCC)   • Essential hypertension   • H/O aortic valve replacement with tissue graft   • Mixed hyperlipidemia   • Impaired fasting glucose   • Iron deficiency anemia   • Coronary artery disease involving native coronary artery of native heart without angina pectoris   • PAF (paroxysmal atrial fibrillation) (HCC)   • Transient amnesia   • Thrombocytopenia (HCC)   • Osteoporosis       PAST MEDICAL HISTORY:  Past Medical History:   Diagnosis Date   • Arthritis     HIP   • Cancer (HCC)    • Cancer of prostate (HCC) 1998    removed in 1998, [facility] Dr. SOTERO Islas   • Heart attack (HCC)    • High blood pressure    • High cholesterol     • Irregular heart beat     has ICD Pacemaker and Medication       PAST SURGICAL HISTORY:  Past Surgical History:   Procedure Laterality Date   • AORTIC VALVE REPLACEMENT     • CARDIAC DEFIBRILLATOR PLACEMENT      pulse generator   • CATARACT EXTRACTION, BILATERAL Bilateral    • HIP SURGERY Bilateral    • PROSTATE CANCER GENE 3(PCA3) (HISTORICAL)      removed 1998 Urology Dr. ERNESTO Islas,    • PROSTATECTOMY         FAMILY HISTORY:  Family History   Problem Relation Age of Onset   • Emphysema Mother    • Substance Abuse Other    • Cerebral aneurysm Brother    • Dementia Neg Hx        SOCIAL HISTORY:  Social History     Socioeconomic History   • Marital status: /Civil Union     Spouse name: Not on file   • Number of children: Not on file   • Years of education: Not on file   • Highest education level: Not on file   Occupational History   • Occupation: retired    Tobacco Use   • Smoking status: Never   • Smokeless tobacco: Never   Vaping Use   • Vaping status: Never Used   Substance and Sexual Activity   • Alcohol use: Yes     Comment: social   • Drug use: No   • Sexual activity: Not Currently     Comment: denied: history of high risk sexual behavior   Other Topics Concern   • Not on file   Social History Narrative        2 children 3 step children    Retired-/Material Handling    No teeth    Hobbies-golfing     Social Determinants of Health     Financial Resource Strain: Low Risk  (10/11/2023)    Overall Financial Resource Strain (CARDIA)    • Difficulty of Paying Living Expenses: Not hard at all   Food Insecurity: Not on file   Transportation Needs: No Transportation Needs (10/11/2023)    PRAPARE - Transportation    • Lack of Transportation (Medical): No    • Lack of Transportation (Non-Medical): No   Physical Activity: Not on file   Stress: Not on file   Social Connections: Not on file   Intimate Partner Violence: Not on file   Housing Stability: Not on file       Review of  "Systems   Constitutional:  Negative for activity change, chills, fatigue and fever.   HENT:  Negative for congestion.    Eyes:  Negative for discharge.   Respiratory:  Negative for cough, chest tightness and shortness of breath.    Cardiovascular:  Negative for chest pain, palpitations and leg swelling.   Gastrointestinal:  Negative for abdominal pain, blood in stool, constipation, diarrhea, nausea and vomiting.   Endocrine: Negative for polydipsia, polyphagia and polyuria.   Genitourinary:  Negative for difficulty urinating.   Musculoskeletal:  Negative for arthralgias and myalgias.   Skin:  Negative for rash.   Allergic/Immunologic: Negative for immunocompromised state.   Neurological:  Negative for dizziness, syncope, weakness, light-headedness and headaches.   Hematological:  Negative for adenopathy. Does not bruise/bleed easily.   Psychiatric/Behavioral:  Negative for dysphoric mood, sleep disturbance and suicidal ideas. The patient is not nervous/anxious.          Objective:  Vitals:    04/17/24 1014   BP: 112/52   BP Location: Left arm   Patient Position: Sitting   Cuff Size: Standard   Pulse: 68   Resp: 18   Temp: 99.8 °F (37.7 °C)   TempSrc: Tympanic   SpO2: 97%   Weight: 88.8 kg (195 lb 12.8 oz)   Height: 5' 7\" (1.702 m)     Body mass index is 30.67 kg/m².     Physical Exam  Vitals and nursing note reviewed.   Constitutional:       General: He is not in acute distress.     Appearance: He is well-developed. He is not ill-appearing.   HENT:      Head: Normocephalic and atraumatic.      Comments: Patient wears hearing aids  Eyes:      Extraocular Movements: Extraocular movements intact.      Pupils: Pupils are equal, round, and reactive to light.   Neck:      Thyroid: No thyromegaly.      Vascular: No carotid bruit or JVD.   Cardiovascular:      Rate and Rhythm: Normal rate and regular rhythm.      Heart sounds: Normal heart sounds.   Pulmonary:      Effort: Pulmonary effort is normal. No respiratory " distress.      Breath sounds: Normal breath sounds.   Musculoskeletal:      Cervical back: Neck supple.      Right lower leg: No edema.      Left lower leg: No edema.   Lymphadenopathy:      Cervical: No cervical adenopathy.   Skin:     General: Skin is warm and dry.      Findings: No rash.   Neurological:      General: No focal deficit present.      Mental Status: He is alert and oriented to person, place, and time. Mental status is at baseline.   Psychiatric:         Mood and Affect: Mood normal.         Behavior: Behavior normal.           RESULTS:  Hemoglobin A1C   Date/Time Value Ref Range Status   04/10/2024 08:56 AM 6.0 (H) Normal 4.0-5.6%; PreDiabetic 5.7-6.4%; Diabetic >=6.5%; Glycemic control for adults with diabetes <7.0% % Final     Cholesterol   Date/Time Value Ref Range Status   04/10/2024 08:56  See Comment mg/dL Final     Comment:     Cholesterol:         Pediatric <18 Years        Desirable          <170 mg/dL      Borderline High    170-199 mg/dL      High               >=200 mg/dL        Adult >=18 Years            Desirable        <200 mg/dL      Borderline High  200-239 mg/dL      High             >239 mg/dL       Triglycerides   Date/Time Value Ref Range Status   04/10/2024 08:56 AM 59 See Comment mg/dL Final     Comment:     Triglyceride:     0-9Y            <75mg/dL     10Y-17Y         <90 mg/dL       >=18Y     Normal          <150 mg/dL     Borderline High 150-199 mg/dL     High            200-499 mg/dL        Very High       >499 mg/dL    Specimen collection should occur prior to Metamizole administration due to the potential for falsely depressed results.   12/03/2015 08:51  mg/dL Final     Comment:     TRIGLYCERIDE:       Normal              <150 mg/dl       Borderline High    150-199 mg/dl       High               200-499 mg/dl       Very High          >499 mg/dl  _______________________________________       HDL   Date/Time Value Ref Range Status   12/03/2015 08:51 AM 52  mg/dL Final     Comment:     HDL:       High       >59 mg/dl       Low        <41 mg/dl  ______________________________       HDL, Direct   Date/Time Value Ref Range Status   04/10/2024 08:56 AM 54 >=40 mg/dL Final     LDL Calculated   Date/Time Value Ref Range Status   04/10/2024 08:56 AM 73 0 - 100 mg/dL Final     Comment:     LDL Cholesterol:     Optimal           <100 mg/dl     Near Optimal      100-129 mg/dl     Above Optimal       Borderline High 130-159 mg/dl       High            160-189 mg/dl       Very High       >189 mg/dl         This screening LDL is a calculated result.   It does not have the accuracy of the Direct Measured LDL in the monitoring of patients with hyperlipidemia and/or statin therapy.   Direct Measure LDL (BTN309) must be ordered separately in these patients.   12/03/2015 08:51 AM 69 0 - 100 mg/dL Final     Comment:     LDL, CALCULATED:     This screening LDL is a calculated result.  It does not have the accuracy of the Direct Measured LDL in the  monitoring of patients with hyperlipidemia and/or statin therapy.  Direct Measured LDL (Test Code 3126) must be ordered separately in these  patients.  ______________________________  The above 4 analytes were performed by [facility]  [address] [place] [id number]       Hemoglobin   Date/Time Value Ref Range Status   04/10/2024 08:56 AM 11.4 (L) 12.0 - 17.0 g/dL Final   08/14/2020 11:15 AM 9.6 (L) 12.5 - 17.0 g/dL Final     Hematocrit   Date/Time Value Ref Range Status   04/10/2024 08:56 AM 33.9 (L) 36.5 - 49.3 % Final   08/14/2020 11:15 AM 27.0 (L) 37.0 - 48.0 % Final     Platelets   Date/Time Value Ref Range Status   04/10/2024 08:56  (L) 149 - 390 Thousands/uL Final   11/18/2015 08:57  149 - 390 Thousand/uL Final     PSA   Date/Time Value Ref Range Status   12/05/2016 08:49 AM <0.1 0.0 - 4.0 ng/mL Final     Comment:       American Urological Association Guidelines define biochemical recurrence of prostate cancer as a  detectable or rising PSA value post-radical prostatectomy that is greater than or equal to 0.2 ng/mL with a second confirmatory level of greater than or equal to 0.2 ng/mL.   12/03/2015 08:53 AM <0.1 0.0 - 4.0 ng/mL Final     Comment:     PSA values from one laboratory may not be comparable to those from  another because of the various testing technologies employed.  Additionally, PSA results can not be interpreted as absolute evidence of  the presence or absence of disease. This PSA value was obtained by [id number] [employer] Chemiluminometric Immunoassay.  The above 1 analytes were performed by [address] [id number]       PSA, Diagnostic   Date/Time Value Ref Range Status   02/23/2023 08:32 AM <0.1 0.0 - 4.0 ng/mL Final     Comment:     American Urological Association Guidelines define biochemical recurrence of prostate cancer as a detectable or rising PSA value post-radical prostatectomy that is greater than or equal to 0.2 ng/mL with a second confirmatory level of greater than or equal to 0.2 ng/mL.     TSH 3RD GENERATON   Date/Time Value Ref Range Status   09/09/2020 11:22 AM 1.340 0.358 - 3.740 uIU/mL Final     Comment:     Using supplements with high doses of biotin 20 to more than 300 times greater than the adequate daily intake for adults of 30 mcg/day as established by the [employer], can cause falsely depress results.     Sodium   Date/Time Value Ref Range Status   04/10/2024 08:56  135 - 147 mmol/L Final   09/08/2023 07:58  135 - 145 mmol/L Final     BUN   Date/Time Value Ref Range Status   04/10/2024 08:56 AM 17 5 - 25 mg/dL Final   09/08/2023 07:58 AM 21 7 - 28 mg/dL Final     Creatinine   Date/Time Value Ref Range Status   04/10/2024 08:56 AM 0.92 0.60 - 1.30 mg/dL Final     Comment:     Standardized to IDMS reference method   09/08/2023 07:58 AM 1.08 0.53 - 1.30 mg/dL Final      In chart    This note was created with voice recognition software.  Phonic,  grammatical and spelling errors may be present within the note as a result.

## 2024-07-15 ENCOUNTER — TELEPHONE (OUTPATIENT)
Dept: INTERNAL MEDICINE CLINIC | Facility: CLINIC | Age: 89
End: 2024-07-15

## 2024-07-15 NOTE — TELEPHONE ENCOUNTER
Patient dropped off a form to be completed.    Please call patient when form is ready for .    Put in MA bin

## 2024-08-05 ENCOUNTER — OFFICE VISIT (OUTPATIENT)
Dept: URGENT CARE | Facility: CLINIC | Age: 89
End: 2024-08-05
Payer: MEDICARE

## 2024-08-05 VITALS
TEMPERATURE: 97.8 F | HEART RATE: 66 BPM | RESPIRATION RATE: 18 BRPM | DIASTOLIC BLOOD PRESSURE: 54 MMHG | OXYGEN SATURATION: 98 % | SYSTOLIC BLOOD PRESSURE: 127 MMHG

## 2024-08-05 DIAGNOSIS — Z01.30 BLOOD PRESSURE CHECK: ICD-10-CM

## 2024-08-05 DIAGNOSIS — I99.8 FLUCTUATING BLOOD PRESSURE: Primary | ICD-10-CM

## 2024-08-05 PROBLEM — Z79.01 LONG TERM CURRENT USE OF ANTICOAGULANT THERAPY: Chronic | Status: ACTIVE | Noted: 2020-10-02

## 2024-08-05 PROBLEM — Z96.641 STATUS POST TOTAL REPLACEMENT OF RIGHT HIP: Status: ACTIVE | Noted: 2020-08-20

## 2024-08-05 PROBLEM — R26.9 ABNORMAL GAIT: Status: ACTIVE | Noted: 2020-08-14

## 2024-08-05 PROBLEM — R73.9 HYPERGLYCEMIA: Status: ACTIVE | Noted: 2020-08-12

## 2024-08-05 PROBLEM — H34.211 HOLLENHORST PLAQUE, RIGHT EYE: Chronic | Status: ACTIVE | Noted: 2020-09-17

## 2024-08-05 PROBLEM — C61 MALIGNANT NEOPLASM OF PROSTATE (HCC): Status: ACTIVE | Noted: 2023-03-07

## 2024-08-05 PROBLEM — Z45.02 AICD AT END OF BATTERY LIFE: Status: ACTIVE | Noted: 2020-05-28

## 2024-08-05 PROBLEM — M16.11 OSTEOARTHRITIS OF RIGHT HIP: Status: ACTIVE | Noted: 2020-08-12

## 2024-08-05 PROCEDURE — G0463 HOSPITAL OUTPT CLINIC VISIT: HCPCS

## 2024-08-05 PROCEDURE — 99213 OFFICE O/P EST LOW 20 MIN: CPT

## 2024-08-05 RX ORDER — SPIRONOLACTONE 25 MG/1
25 TABLET ORAL DAILY
COMMUNITY
Start: 2024-07-29 | End: 2025-07-29

## 2024-08-06 NOTE — PROGRESS NOTES
[facility]        NAME: Bryson Cruz is a 89 y.o. male  : 1934    MRN: [id number]  DATE: 2024  TIME: 8:38 PM    Assessment and Plan   Fluctuating blood pressure [I99.8]  1. Fluctuating blood pressure        2. Blood pressure check              Patient Instructions   Reassured Bryson Cruz that his lungs are clear on examination today.     Vital signs WNL.   Monitor blood pressure at home as directed at discharge from the hospital.     Provided information on a Heart Healthy Diet and Low-Sodium Diet.     Advised to weigh himself daily.   Write it down in a log for his PCP and Cardiologist.  Weight gain of more than 2-3 lbs in 24 hrs or more than 5 lbs in a week can be signs and symptoms of worsening heart failure.     Please call your PCP/Cardiologist tomorrow to schedule an appt for follow up care after discharge from the hospital.    Any confusion, impaired thinking, shortness of breath at rest, shortness of breath with exertion unrelieved by rest, wheezing or increased swelling to bilateral lower extremities please proceed to the ER or call 911.       Follow up with PCP in 3-5 days.  Proceed to ER if symptoms worsen.    If tests are performed, our office will contact you with results only if changes need to made to the care plan discussed with you at the visit. [facility]    Chief Complaint   No chief complaint on file.        History of Present Illness       HPI    Review of Systems   Review of Systems      Current Medications       Current Outpatient Medications:   •  carvedilol (COREG) 12.5 mg tablet, Take 18.75 mg by mouth, Disp: , Rfl:   •  cyanocobalamin 100 MCG tablet, Take 2,000 mcg by mouth, Disp: , Rfl:   •  Eliquis 5 MG, TAKE 1 TABLET TWICE A DAY, Disp: 180 tablet, Rfl: 1  •  ferrous sulfate 325 (65 Fe) mg tablet, Take 325 mg by mouth daily with breakfast, Disp: , Rfl:   •  furosemide (LASIX) 20 mg tablet, Take 40 mg by mouth  daily, Disp: , Rfl:   •  Iron-Vitamin C (IRON 100/C PO), Take by mouth, Disp: , Rfl:   •  leuprolide (ELIGARD) 45 MG injection, Inject 45 mg under the skin, Disp: , Rfl:   •  lifitegrast (XIIDRA) 5 % op solution, Apply 1 drop to eye, Disp: , Rfl:   •  lisinopril (ZESTRIL) 5 mg tablet, Take 5 mg by mouth, Disp: , Rfl:   •  Multiple Vitamin (MULTIVITAMIN PO), Take by mouth, Disp: , Rfl:   •  Omega-3 Fatty Acids (FISH OIL PO), Take 2,400 mg by mouth, Disp: , Rfl:   •  potassium chloride (Klor-Con M20) 20 mEq tablet, , Disp: , Rfl:   •  potassium chloride (KLOR-CON) 20 mEq packet, Take 20 mEq by mouth daily, Disp: , Rfl:   •  spironolactone (ALDACTONE) 25 mg tablet, Take 25 mg by mouth daily, Disp: , Rfl:   •  rosuvastatin (CRESTOR) 20 MG tablet, TAKE 1 TABLET DAILY, Disp: 90 tablet, Rfl: 3    Current Allergies     Allergies as of 08/05/2024 - Reviewed 08/05/2024   Allergen Reaction Noted   • Simvastatin Allergic Rhinitis 02/28/2019            The following portions of the patient's history were reviewed and updated as appropriate: allergies, current medications, past family history, past medical history, past social history, past surgical history and problem list.     Past Medical History:   Diagnosis Date   • Arthritis     HIP   • Cancer (HCC)    • Cancer of prostate (HCC) 1998    removed in 1998, [facility] Dr. SOTERO Islas   • Heart attack (HCC)    • High blood pressure    • High cholesterol    • Irregular heart beat     has ICD Pacemaker and Medication       Past Surgical History:   Procedure Laterality Date   • AORTIC VALVE REPLACEMENT     • CARDIAC DEFIBRILLATOR PLACEMENT      pulse generator   • CATARACT EXTRACTION, BILATERAL Bilateral    • HIP SURGERY Bilateral    • PROSTATE CANCER GENE 3(PCA3) (HISTORICAL)      removed 1998 Urology Dr. ERNESTO Islas,    • PROSTATECTOMY         Family History   Problem Relation Age of Onset   • Emphysema Mother    • Substance Abuse Other    • Cerebral aneurysm Brother    • Dementia  Neg Hx          Medications have been verified.        Objective   /54   Pulse 66   Temp 97.8 °F (36.6 °C)   Resp 18   SpO2 98%        Physical Exam     Physical Exam                 Cardiovascular:      Rate and Rhythm: Normal rate and regular rhythm.      Pulses: Normal pulses.      Heart sounds: Normal heart sounds. No murmur heard.  Pulmonary:      Effort: Pulmonary effort is normal. No respiratory distress.      Breath sounds: Normal breath sounds. No stridor. No wheezing, rhonchi or rales.   Chest:      Chest wall: No tenderness.   Musculoskeletal:         General: Normal range of motion.      Right lower le+ Edema present.      Left lower le+ Edema present.   Skin:     General: Skin is warm.      Findings: No rash.   Neurological:      General: No focal deficit present.      Mental Status: He is alert and oriented to person, place, and time. Mental status is at baseline.   Psychiatric:         Mood and Affect: Mood normal.

## 2024-08-06 NOTE — PATIENT INSTRUCTIONS
Reassured Bryson Cruz that his lungs are clear on examination today.     Vital signs WNL.   Monitor blood pressure at home as directed at discharge from the hospital.     Provided information on a Heart Healthy Diet and Low-Sodium Diet.     Advised to weigh himself daily.   Write it down in a log for his PCP and Cardiologist.  Weight gain of more than 2-3 lbs in 24 hrs or more than 5 lbs in a week can be signs and symptoms of worsening heart failure.     Please call your PCP/Cardiologist tomorrow to schedule an appt for follow up care after discharge from the hospital.    Any confusion, impaired thinking, shortness of breath at rest, shortness of breath with exertion unrelieved by rest, wheezing or increased swelling to bilateral lower extremities please proceed to the ER or call 911.       Follow up with PCP in 3-5 days.  Proceed to ER if symptoms worsen.    If tests are performed, our office will contact you with results only if changes need to made to the care plan discussed with you at the visit. You can review your full results on [facility].

## 2024-08-08 DIAGNOSIS — I48.0 PAF (PAROXYSMAL ATRIAL FIBRILLATION) (HCC): ICD-10-CM

## 2024-08-08 RX ORDER — APIXABAN 5 MG/1
5 TABLET, FILM COATED ORAL 2 TIMES DAILY
Qty: 180 TABLET | Refills: 1 | Status: SHIPPED | OUTPATIENT
Start: 2024-08-08

## 2024-09-19 ENCOUNTER — TELEPHONE (OUTPATIENT)
Age: 89
End: 2024-09-19

## 2024-09-19 PROBLEM — J90 PLEURAL EFFUSION: Status: ACTIVE | Noted: 2024-09-19

## 2024-09-19 PROBLEM — R91.1 PULMONARY NODULE: Status: ACTIVE | Noted: 2024-09-19

## 2024-09-19 NOTE — ASSESSMENT & PLAN NOTE
Patient initially presented to [facility] with shortness of breath  CTA revealed moderate right pleural effusion  Patient underwent thoracentesis with IR and 1.26 L of fluid was removed  Patient reports significant improvement of shortness of breath

## 2024-09-19 NOTE — PROGRESS NOTES
"Transition of Care Visit  Name: Bryson Cruz      : 1934      MRN: [id number]  Encounter Provider: Martha Hinkle PA-C  Encounter Date: 2024   Encounter department: [facility] [place]    Assessment & Plan  Pulmonary nodule  CTA on 9/10/2024 reveals \"Numerous small LEFT pulmonary nodules. Additionally, there is a 1 cm   pulmonary nodule associated with the major fissure. \"  Pulmonary nodule recommendation: Per Fleischner society guidelines, non-contrast   chest CT at 3-6 months is recommended. If the nodules are stable at time of   repeat CT, then future CT at 18-24 months (from today's scan) is considered   optional for low-risk patients, but is recommended for high-risk patients.   Will order CT chest to be performed in 3-6 months    Orders:    CT chest wo contrast; Future    Pleural effusion  Patient initially presented to [facility] with shortness of breath  CTA revealed moderate right pleural effusion  Patient underwent thoracentesis with IR and 1.26 L of fluid was removed  Patient reports significant improvement of shortness of breath              History of Present Illness     Transitional Care Management Review:   Bryson Cruz is a 89 y.o. male here for TCM follow up.     During the TCM phone call patient stated:  TCM Call       Date and time call was made  3/2/2020  3:56 PM    Hospital care reviewed  Records reviewed    Patient was hospitialized at  [facility]    Date of Admission  20    Date of discharge  20    Diagnosis  amnesia    Disposition  Home    Were the patients medications reviewed and updated  No    Current Symptoms  None          TCM Call       Post hospital issues  None    Should patient be enrolled in anticoag monitoring?  No    Scheduled for follow up?  Yes    Did you obtain your prescribed medications  Yes    Do you need help managing your prescriptions or medications  No    Is transportation to your appointment needed  No    Living " "Arrangements  Spouse or Significiant other    Support System  Spouse    The type of support provided  Emotional; Financial; Physical    Do you have social support  Yes, as much as I need    Are you recieving any outpatient services  No    Are you recieving home care services  No    Are you using any community resources  No    Current waiver services  No    Have you fallen in the last 12 months  No    Interperter language line needed  No    Counseling  Family          Patient is a pleasant 89-year-old male who presents in the office today for TCM visit.  He was recently admitted to [facility] for shortness of breath.  He was found with pulmonary edema and moderate right side pleural effusion.  He underwent thoracentesis with removal of 1.26 L of fluid with significant improvement of symptoms.  He was transitioned to p.o. torsemide and continued on spironolactone and Coreg.  Home lisinopril was discontinued.        Review of Systems   Respiratory:  Negative for shortness of breath and wheezing.    Cardiovascular:  Negative for chest pain.     Objective     /54 (BP Location: Left arm, Patient Position: Sitting, Cuff Size: Adult)   Pulse 60   Resp 16   Ht 5' 7\" (1.702 m)   Wt 78.7 kg (173 lb 6.4 oz)   SpO2 98%   BMI 27.16 kg/m²     Physical Exam  Constitutional:       Appearance: Normal appearance.   HENT:      Nose: Nose normal.      Mouth/Throat:      Mouth: Mucous membranes are moist.   Cardiovascular:      Rate and Rhythm: Normal rate and regular rhythm.      Pulses: Normal pulses.      Heart sounds: Murmur heard.   Pulmonary:      Effort: Pulmonary effort is normal.      Breath sounds: Normal breath sounds.   Skin:     General: Skin is warm and dry.   Neurological:      General: No focal deficit present.      Mental Status: He is alert and oriented to person, place, and time.   Psychiatric:         Mood and Affect: Mood normal.         Behavior: Behavior normal.         Thought Content: Thought content " normal.         Judgment: Judgment normal.     Medications have been reviewed by provider in current encounter    Administrative Statements

## 2024-09-19 NOTE — TELEPHONE ENCOUNTER
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 It was previously being treated with Z-guard. She is is going cleanse with NSS/mild soap and water. Switch to a foam dressing and use skin prep to hold the dressing in place better.   Pt reported to her that he was unknowingly using lysol wipes in the bathroom to clean himself so she is not sure if that is what is contributing to the worsening wound. He was educated on frequently changing positions. Nursing will be back out on Sunday. PT will be there tomorrow and may send order for a roho cushion. Pt has appt in office tomorrow.

## 2024-09-19 NOTE — ASSESSMENT & PLAN NOTE
"CTA on 9/10/2024 reveals \"Numerous small LEFT pulmonary nodules. Additionally, there is a 1 cm   pulmonary nodule associated with the major fissure. \"  Pulmonary nodule recommendation: Per Fleischner society guidelines, non-contrast   chest CT at 3-6 months is recommended. If the nodules are stable at time of   repeat CT, then future CT at 18-24 months (from today's scan) is considered   optional for low-risk patients, but is recommended for high-risk patients.   Will order CT chest to be performed in 3-6 months    Orders:    CT chest wo contrast; Future    "

## 2024-09-20 ENCOUNTER — OFFICE VISIT (OUTPATIENT)
Dept: INTERNAL MEDICINE CLINIC | Facility: CLINIC | Age: 89
End: 2024-09-20
Payer: MEDICARE

## 2024-09-20 VITALS
HEART RATE: 60 BPM | SYSTOLIC BLOOD PRESSURE: 100 MMHG | DIASTOLIC BLOOD PRESSURE: 54 MMHG | OXYGEN SATURATION: 98 % | BODY MASS INDEX: 27.21 KG/M2 | WEIGHT: 173.4 LBS | HEIGHT: 67 IN | RESPIRATION RATE: 16 BRPM

## 2024-09-20 DIAGNOSIS — J90 PLEURAL EFFUSION: Primary | ICD-10-CM

## 2024-09-20 DIAGNOSIS — R91.1 PULMONARY NODULE: ICD-10-CM

## 2024-09-20 PROCEDURE — 99496 TRANSJ CARE MGMT HIGH F2F 7D: CPT | Performed by: PHYSICIAN ASSISTANT

## 2024-09-20 RX ORDER — METOLAZONE 5 MG/1
5 TABLET ORAL
COMMUNITY
Start: 2024-09-18 | End: 2025-09-18

## 2024-09-20 RX ORDER — TORSEMIDE 20 MG/1
TABLET ORAL
COMMUNITY

## 2024-10-08 ENCOUNTER — TELEPHONE (OUTPATIENT)
Age: 89
End: 2024-10-08

## 2024-10-08 NOTE — TELEPHONE ENCOUNTER
Susan from [facility] call in to inform PCP that patient has been having SOB and panic attack at nights, Susan is requesting a pulse Ox be ordered to monitor patients O2 through the night.

## 2024-10-10 LAB
DME PARACHUTE DELIVERY DATE REQUESTED: NORMAL
DME PARACHUTE ITEM DESCRIPTION: NORMAL
DME PARACHUTE ORDER STATUS: NORMAL
DME PARACHUTE SUPPLIER NAME: NORMAL
DME PARACHUTE SUPPLIER PHONE: NORMAL

## 2024-10-16 ENCOUNTER — RA CDI HCC (OUTPATIENT)
Dept: OTHER | Facility: HOSPITAL | Age: 89
End: 2024-10-16